# Patient Record
Sex: MALE | Race: WHITE | Employment: OTHER | URBAN - NONMETROPOLITAN AREA
[De-identification: names, ages, dates, MRNs, and addresses within clinical notes are randomized per-mention and may not be internally consistent; named-entity substitution may affect disease eponyms.]

---

## 2017-01-07 ENCOUNTER — OFFICE VISIT (OUTPATIENT)
Dept: FAMILY MEDICINE CLINIC | Age: 72
End: 2017-01-07
Payer: MEDICARE

## 2017-01-07 VITALS
BODY MASS INDEX: 30.42 KG/M2 | WEIGHT: 212 LBS | OXYGEN SATURATION: 98 % | TEMPERATURE: 98.5 F | DIASTOLIC BLOOD PRESSURE: 70 MMHG | HEART RATE: 69 BPM | SYSTOLIC BLOOD PRESSURE: 134 MMHG

## 2017-01-07 DIAGNOSIS — J06.9 ACUTE URI: Primary | ICD-10-CM

## 2017-01-07 PROCEDURE — 99213 OFFICE O/P EST LOW 20 MIN: CPT | Performed by: NURSE PRACTITIONER

## 2017-01-07 RX ORDER — AZELASTINE 1 MG/ML
1 SPRAY, METERED NASAL 2 TIMES DAILY
Qty: 1 BOTTLE | Refills: 3 | Status: SHIPPED | OUTPATIENT
Start: 2017-01-07 | End: 2018-01-17 | Stop reason: ALTCHOICE

## 2017-01-07 RX ORDER — CEFPROZIL 500 MG/1
500 TABLET, FILM COATED ORAL 2 TIMES DAILY
Qty: 20 TABLET | Refills: 0 | Status: SHIPPED | OUTPATIENT
Start: 2017-01-07 | End: 2017-01-17

## 2017-01-07 ASSESSMENT — ENCOUNTER SYMPTOMS
SINUS PRESSURE: 1
EYES NEGATIVE: 1
RHINORRHEA: 1
ALLERGIC/IMMUNOLOGIC NEGATIVE: 1
SORE THROAT: 1
GASTROINTESTINAL NEGATIVE: 1
COUGH: 1

## 2017-03-06 DIAGNOSIS — Z00.00 ANNUAL PHYSICAL EXAM: ICD-10-CM

## 2017-03-06 LAB
ALBUMIN SERPL-MCNC: 4.1 G/DL (ref 3.5–5.2)
ALP BLD-CCNC: 94 U/L (ref 40–130)
ALT SERPL-CCNC: 34 U/L (ref 5–41)
ANION GAP SERPL CALCULATED.3IONS-SCNC: 13 MMOL/L (ref 7–19)
AST SERPL-CCNC: 33 U/L (ref 5–40)
BILIRUB SERPL-MCNC: 0.7 MG/DL (ref 0.2–1.2)
BILIRUBIN DIRECT: 0.2 MG/DL (ref 0–0.3)
BILIRUBIN URINE: NEGATIVE
BILIRUBIN, INDIRECT: 0.5 MG/DL (ref 0.1–1)
BLOOD, URINE: NEGATIVE
BUN BLDV-MCNC: 11 MG/DL (ref 8–23)
CALCIUM SERPL-MCNC: 9.4 MG/DL (ref 8.8–10.2)
CHLORIDE BLD-SCNC: 105 MMOL/L (ref 98–111)
CLARITY: CLEAR
CO2: 24 MMOL/L (ref 22–29)
COLOR: YELLOW
CREAT SERPL-MCNC: 0.8 MG/DL (ref 0.5–1.2)
GFR NON-AFRICAN AMERICAN: >60
GLUCOSE BLD-MCNC: 105 MG/DL (ref 74–109)
GLUCOSE URINE: NEGATIVE MG/DL
HCT VFR BLD CALC: 44.5 % (ref 42–52)
HEMOGLOBIN: 15.1 G/DL (ref 14–18)
KETONES, URINE: NEGATIVE MG/DL
LEUKOCYTE ESTERASE, URINE: NEGATIVE
MCH RBC QN AUTO: 31.7 PG (ref 27–31)
MCHC RBC AUTO-ENTMCNC: 33.9 G/DL (ref 33–37)
MCV RBC AUTO: 93.5 FL (ref 80–94)
NITRITE, URINE: NEGATIVE
PDW BLD-RTO: 13 % (ref 11.5–14.5)
PH UA: 7.5
PLATELET # BLD: 233 K/UL (ref 130–400)
PMV BLD AUTO: 10 FL (ref 7.4–10.4)
POTASSIUM SERPL-SCNC: 5 MMOL/L (ref 3.5–5)
PROTEIN UA: NEGATIVE MG/DL
RBC # BLD: 4.76 M/UL (ref 4.7–6.1)
SODIUM BLD-SCNC: 142 MMOL/L (ref 136–145)
SPECIFIC GRAVITY UA: 1.02
T4 FREE: 0.9 NG/ML (ref 0.9–1.7)
TOTAL PROTEIN: 6.9 G/DL (ref 6.6–8.7)
TSH SERPL DL<=0.05 MIU/L-ACNC: 2.58 UIU/ML (ref 0.27–4.2)
UROBILINOGEN, URINE: 0.2 E.U./DL
WBC # BLD: 6.4 K/UL (ref 4.8–10.8)

## 2017-03-08 LAB
PROSTATE SPECIFIC ANTIGEN FREE: 1.1 NG/ML
PROSTATE SPECIFIC ANTIGEN PERCENT FREE: 23 %
PROSTATE SPECIFIC ANTIGEN: 4.8 NG/ML (ref 0–4)

## 2017-03-10 LAB
CHOLESTEROL, TOTAL: 137 MG/DL (ref 100–199)
HDL PARTICLE NO, NMR: 33 UMOL/L
HDL SIZE: 8.6 NM
HDLC SERPL-MCNC: 43 MG/DL
LARGE HDL PARTICLE, NMR: 3 UMOL/L
LARGE VLDL PARTICLE, NMR: 3.4 NMOL/L
LDL CHOLESTEROL: 76 MG/DL (ref 0–99)
LDL PARTICLE NUMBER, NMR: 1167 NMOL/L
LDL PARTICLE SIZE: 21 NM
LDL SIZE: 21 NM
LP INSULIN RESIST SCORE: 70
SMALL LDL PARTICLE, NMR: 546 NMOL/L
SMALL LDL-P: 546 NMOL/L
TRIGL SERPL-MCNC: 90 MG/DL (ref 0–149)
VLDL SIZE: 47.2 NM

## 2017-03-17 ENCOUNTER — OFFICE VISIT (OUTPATIENT)
Dept: FAMILY MEDICINE CLINIC | Age: 72
End: 2017-03-17
Payer: MEDICARE

## 2017-03-17 VITALS
DIASTOLIC BLOOD PRESSURE: 62 MMHG | BODY MASS INDEX: 30.13 KG/M2 | SYSTOLIC BLOOD PRESSURE: 120 MMHG | WEIGHT: 210 LBS | OXYGEN SATURATION: 98 % | TEMPERATURE: 98 F | HEART RATE: 68 BPM

## 2017-03-17 DIAGNOSIS — N40.0 BENIGN NON-NODULAR PROSTATIC HYPERPLASIA WITHOUT LOWER URINARY TRACT SYMPTOMS: ICD-10-CM

## 2017-03-17 DIAGNOSIS — Z00.00 ANNUAL PHYSICAL EXAM: ICD-10-CM

## 2017-03-17 DIAGNOSIS — E78.5 HYPERLIPOPROTEINEMIA: ICD-10-CM

## 2017-03-17 DIAGNOSIS — E78.5 HYPERLIPIDEMIA, UNSPECIFIED HYPERLIPIDEMIA TYPE: ICD-10-CM

## 2017-03-17 DIAGNOSIS — R97.20 ELEVATED PSA: ICD-10-CM

## 2017-03-17 DIAGNOSIS — J01.80 ACUTE NON-RECURRENT SINUSITIS OF OTHER SINUS: ICD-10-CM

## 2017-03-17 DIAGNOSIS — C18.9 MALIGNANT NEOPLASM OF COLON, UNSPECIFIED PART OF COLON (HCC): ICD-10-CM

## 2017-03-17 DIAGNOSIS — N43.3 HYDROCELE, UNSPECIFIED HYDROCELE TYPE: ICD-10-CM

## 2017-03-17 PROCEDURE — 99214 OFFICE O/P EST MOD 30 MIN: CPT | Performed by: FAMILY MEDICINE

## 2017-03-17 RX ORDER — ROSUVASTATIN CALCIUM 20 MG/1
20 TABLET, COATED ORAL NIGHTLY
Qty: 90 TABLET | Refills: 3 | Status: SHIPPED | OUTPATIENT
Start: 2017-03-17 | End: 2018-05-13 | Stop reason: SDUPTHER

## 2017-03-17 RX ORDER — BENZONATATE 200 MG/1
200 CAPSULE ORAL 3 TIMES DAILY PRN
Qty: 60 CAPSULE | Refills: 1 | Status: SHIPPED | OUTPATIENT
Start: 2017-03-17 | End: 2018-01-17 | Stop reason: ALTCHOICE

## 2017-03-17 RX ORDER — MONTELUKAST SODIUM 10 MG/1
10 TABLET ORAL DAILY
Qty: 30 TABLET | Refills: 5 | Status: SHIPPED | OUTPATIENT
Start: 2017-03-17 | End: 2018-01-17 | Stop reason: ALTCHOICE

## 2017-03-17 ASSESSMENT — ENCOUNTER SYMPTOMS
EYES NEGATIVE: 1
DIARRHEA: 0
SHORTNESS OF BREATH: 0
VOMITING: 0
ALLERGIC/IMMUNOLOGIC NEGATIVE: 1
CHEST TIGHTNESS: 0
BLOOD IN STOOL: 0
CHOKING: 0
COUGH: 1
CONSTIPATION: 0
NAUSEA: 0
WHEEZING: 0

## 2017-08-04 DIAGNOSIS — R39.15 URGENCY OF URINATION: Primary | ICD-10-CM

## 2017-08-07 RX ORDER — TAMSULOSIN HYDROCHLORIDE 0.4 MG/1
CAPSULE ORAL
Qty: 90 CAPSULE | Refills: 3 | Status: SHIPPED | OUTPATIENT
Start: 2017-08-07 | End: 2018-08-06 | Stop reason: SDUPTHER

## 2017-11-08 ENCOUNTER — OFFICE VISIT (OUTPATIENT)
Dept: UROLOGY | Facility: CLINIC | Age: 72
End: 2017-11-08

## 2017-11-08 VITALS
BODY MASS INDEX: 28.77 KG/M2 | DIASTOLIC BLOOD PRESSURE: 74 MMHG | WEIGHT: 201 LBS | TEMPERATURE: 98.6 F | SYSTOLIC BLOOD PRESSURE: 112 MMHG | HEIGHT: 70 IN

## 2017-11-08 DIAGNOSIS — N40.1 BENIGN PROSTATIC HYPERPLASIA WITH LOWER URINARY TRACT SYMPTOMS, SYMPTOM DETAILS UNSPECIFIED: ICD-10-CM

## 2017-11-08 DIAGNOSIS — R97.20 ELEVATED PSA: ICD-10-CM

## 2017-11-08 DIAGNOSIS — N43.3 HYDROCELE, UNSPECIFIED HYDROCELE TYPE: ICD-10-CM

## 2017-11-08 DIAGNOSIS — R39.15 URGENCY OF URINATION: Primary | ICD-10-CM

## 2017-11-08 LAB
BILIRUB BLD-MCNC: NEGATIVE MG/DL
CLARITY, POC: CLEAR
COLOR UR: YELLOW
GLUCOSE UR STRIP-MCNC: NEGATIVE MG/DL
KETONES UR QL: NEGATIVE
LEUKOCYTE EST, POC: ABNORMAL
NITRITE UR-MCNC: NEGATIVE MG/ML
PH UR: 8 [PH] (ref 5–8)
PROT UR STRIP-MCNC: NEGATIVE MG/DL
RBC # UR STRIP: NEGATIVE /UL
SP GR UR: 1.01 (ref 1–1.03)
UROBILINOGEN UR QL: NORMAL

## 2017-11-08 PROCEDURE — 81003 URINALYSIS AUTO W/O SCOPE: CPT | Performed by: UROLOGY

## 2017-11-08 PROCEDURE — 99213 OFFICE O/P EST LOW 20 MIN: CPT | Performed by: UROLOGY

## 2017-11-08 NOTE — PROGRESS NOTES
Subjective    Mr. Rodriguez is 72 y.o. male    CHIEF COMPLAINT: BPH    The patient comes back today for follow-up of BPH clinically he is doing well he is a way score today is 21 which is high but it does not bother him at all he still takes Flomax he says his symptoms are stable so he does not want to do anything different.    He has no gross hematuria is had no flank pain no real burning stinging no urinary tract infections etc. since we seen.    His PSA is back up a little bit at 5.8 but again probably pretty much normal for his age he has had negative biopsies in the past we discussed this at length      History of Present Illness      The following portions of the patient's history were reviewed and updated as appropriate: allergies, current medications, past family history, past medical history, past social history, past surgical history and problem list.    Review of Systems   Constitutional: Negative for chills and fever.   Gastrointestinal: Negative for abdominal pain, anal bleeding and blood in stool.   Genitourinary: Positive for frequency. Negative for difficulty urinating, flank pain, hematuria and urgency.         Current Outpatient Prescriptions:   •  aspirin 81 MG EC tablet, Take 81 mg by mouth Daily., Disp: , Rfl:   •  B Complex-C (SUPER B COMPLEX PO), Take  by mouth., Disp: , Rfl:   •  CIALIS 5 MG tablet, TAKE ONE TABLET BY MOUTH AS NEEDED FOR erectile dysfunction, Disp: , Rfl: 5  •  Coenzyme Q10 200 MG capsule, Take 200 mg by mouth Daily., Disp: , Rfl:   •  Loratadine 10 MG capsule, Take  by mouth., Disp: , Rfl:   •  Omega-3 Fatty Acids (FISH OIL) 1000 MG capsule capsule, Take  by mouth Daily With Breakfast., Disp: , Rfl:   •  rosuvastatin (CRESTOR) 20 MG tablet, Take 20 mg by mouth Daily., Disp: , Rfl:   •  tamsulosin (FLOMAX) 0.4 MG capsule 24 hr capsule, TAKE ONE CAPSULE BY MOUTH DAILY, Disp: 90 capsule, Rfl: 3    Past Medical History:   Diagnosis Date   • BPH with urinary obstruction    • Colon  "cancer    • Elevated PSA    • Frequency of urination    • Hydrocele    • Hyperlipemia    • Peritonitis    • Testalgia        Past Surgical History:   Procedure Laterality Date   • APPENDECTOMY     • COLONOSCOPY     • FOOT SURGERY     • HERNIA REPAIR     • PROSTATE BIOPSY         Social History     Social History   • Marital status:      Spouse name: N/A   • Number of children: N/A   • Years of education: N/A     Social History Main Topics   • Smoking status: Never Smoker   • Smokeless tobacco: Never Used   • Alcohol use Yes   • Drug use: None   • Sexual activity: Not Asked     Other Topics Concern   • None     Social History Narrative       Family History   Problem Relation Age of Onset   • No Known Problems Father    • No Known Problems Mother        Objective    /74  Temp 98.6 °F (37 °C)  Ht 70\" (177.8 cm)  Wt 201 lb (91.2 kg)  BMI 28.84 kg/m2    Physical Exam   Constitutional: He is oriented to person, place, and time. He appears well-developed and well-nourished.   Pulmonary/Chest: Effort normal.   Abdominal: Soft. He exhibits no distension and no mass. There is no tenderness. There is no rebound and no guarding. No hernia. Hernia confirmed negative in the right inguinal area and confirmed negative in the left inguinal area.   Genitourinary: Penis normal. Rectal exam shows no mass, no tenderness and anal tone normal. Enlarged: for the age of the patient. Right testis shows mass. Right testis shows no swelling and no tenderness. Left testis shows no mass, no swelling and no tenderness. Uncircumcised. No hypospadias. No discharge found.   Genitourinary Comments:  The urethral meatus normal in position without evidence of stricture. Epididymis without mass or tenderness. Vas Deferens is palpably normal.Anus and perineum without mass or tenderness. The prostate is approximately 35 ml. It is Symmetric, with a Soft consistency. There are no nodules present. . The seminal vesicles are Not palpable due " to the size of the prostate.    A moderate sized right hydrocele     Neurological: He is alert and oriented to person, place, and time.   Vitals reviewed.        Office Visit on 11/02/2016   Component Date Value Ref Range Status   • Color 11/02/2016 Yellow  Yellow, Straw, Dark Yellow, Cori Final   • Clarity, UA 11/02/2016 Clear  Clear Final   • Glucose, UA 11/02/2016 Negative  Negative mg/dL Final   • Bilirubin 11/02/2016 Negative  Negative Final   • Ketones, UA 11/02/2016 Negative  Negative Final   • Specific Gravity  11/02/2016 1.015  1.005 - 1.030 Final   • Blood, UA 11/02/2016 Negative  Negative Final   • pH, Urine 11/02/2016 7.0  5.0 - 8.0 Final   • Protein, POC 11/02/2016 Negative  Negative mg/dL Final   • Urobilinogen, UA 11/02/2016 Normal  Normal Final   • Leukocytes 11/02/2016 Trace* Negative Final   • Nitrite, UA 11/02/2016 Negative  Negative Final   • Color 11/02/2016 Yellow  Yellow, Straw, Dark Yellow, Cori Final   • Clarity, UA 11/02/2016 Clear  Clear Final   • Glucose, UA 11/02/2016 Negative  Negative mg/dL Final   • Bilirubin 11/02/2016 Negative  Negative Final   • Ketones, UA 11/02/2016 Negative  Negative Final   • Specific Gravity  11/02/2016 1.015  1.005 - 1.030 Final   • Blood, UA 11/02/2016 Negative  Negative Final   • pH, Urine 11/02/2016 7.0  5.0 - 8.0 Final   • Protein, POC 11/02/2016 Negative  Negative mg/dL Final   • Urobilinogen, UA 11/02/2016 Normal  Normal Final   • Leukocytes 11/02/2016 Trace* Negative Final   • Nitrite, UA 11/02/2016 Negative  Negative Final       Results for orders placed or performed in visit on 11/08/17   POC Urinalysis Dipstick, Automated   Result Value Ref Range    Color Yellow Yellow, Straw, Dark Yellow, Cori    Clarity, UA Clear Clear    Glucose, UA Negative Negative, 1000 mg/dL (3+) mg/dL    Bilirubin Negative Negative    Ketones, UA Negative Negative    Specific Gravity  1.015 1.005 - 1.030    Blood, UA Negative Negative    pH, Urine 8.0 5.0 - 8.0     Protein, POC Negative Negative mg/dL    Urobilinogen, UA Normal Normal    Leukocytes Trace (A) Negative    Nitrite, UA Negative Negative       Assessment and Plan    Diagnoses and all orders for this visit:    Urgency of urination  -     POC Urinalysis Dipstick, Automated    Elevated PSA    Hydrocele, unspecified hydrocele type    Benign prostatic hyperplasia with lower urinary tract symptoms, symptom details unspecified    Plan--from a BPH point review the patient seems to be stable and no he does have a high score he will continue his Flomax if his symptoms worsen he will do something different he will let me know    His elevated PSA is slightly higher than it was/is pre-much in the same ballpark and not significantly  For his age he has had negative biopsies on was seen back again in 1 year's time with a PSA he is comfortable this he understands my concerns and I think of answered all his questions    He does not want to do anything with his hydrocele this time

## 2018-01-17 ENCOUNTER — OFFICE VISIT (OUTPATIENT)
Dept: FAMILY MEDICINE CLINIC | Age: 73
End: 2018-01-17
Payer: MEDICARE

## 2018-01-17 VITALS
SYSTOLIC BLOOD PRESSURE: 126 MMHG | TEMPERATURE: 97.9 F | HEART RATE: 65 BPM | DIASTOLIC BLOOD PRESSURE: 74 MMHG | HEIGHT: 70 IN | OXYGEN SATURATION: 97 % | WEIGHT: 216.25 LBS | BODY MASS INDEX: 30.96 KG/M2

## 2018-01-17 DIAGNOSIS — Z92.21 STATUS POST CHEMOTHERAPY: ICD-10-CM

## 2018-01-17 DIAGNOSIS — J30.2 CHRONIC SEASONAL ALLERGIC RHINITIS, UNSPECIFIED TRIGGER: ICD-10-CM

## 2018-01-17 DIAGNOSIS — R79.89 ABNORMAL LIVER FUNCTION TESTS: ICD-10-CM

## 2018-01-17 DIAGNOSIS — E78.2 MIXED HYPERLIPIDEMIA: ICD-10-CM

## 2018-01-17 DIAGNOSIS — N40.0 BENIGN NON-NODULAR PROSTATIC HYPERPLASIA WITHOUT LOWER URINARY TRACT SYMPTOMS: ICD-10-CM

## 2018-01-17 DIAGNOSIS — R73.9 HYPERGLYCEMIA: ICD-10-CM

## 2018-01-17 DIAGNOSIS — C18.9 MALIGNANT NEOPLASM OF COLON, UNSPECIFIED PART OF COLON (HCC): ICD-10-CM

## 2018-01-17 PROCEDURE — G8417 CALC BMI ABV UP PARAM F/U: HCPCS | Performed by: FAMILY MEDICINE

## 2018-01-17 PROCEDURE — G8484 FLU IMMUNIZE NO ADMIN: HCPCS | Performed by: FAMILY MEDICINE

## 2018-01-17 PROCEDURE — 99214 OFFICE O/P EST MOD 30 MIN: CPT | Performed by: FAMILY MEDICINE

## 2018-01-17 PROCEDURE — 3017F COLORECTAL CA SCREEN DOC REV: CPT | Performed by: FAMILY MEDICINE

## 2018-01-17 PROCEDURE — 4040F PNEUMOC VAC/ADMIN/RCVD: CPT | Performed by: FAMILY MEDICINE

## 2018-01-17 PROCEDURE — G8427 DOCREV CUR MEDS BY ELIG CLIN: HCPCS | Performed by: FAMILY MEDICINE

## 2018-01-17 PROCEDURE — 1123F ACP DISCUSS/DSCN MKR DOCD: CPT | Performed by: FAMILY MEDICINE

## 2018-01-17 PROCEDURE — 1036F TOBACCO NON-USER: CPT | Performed by: FAMILY MEDICINE

## 2018-01-17 ASSESSMENT — PATIENT HEALTH QUESTIONNAIRE - PHQ9
6. FEELING BAD ABOUT YOURSELF - OR THAT YOU ARE A FAILURE OR HAVE LET YOURSELF OR YOUR FAMILY DOWN: 0
4. FEELING TIRED OR HAVING LITTLE ENERGY: 0
3. TROUBLE FALLING OR STAYING ASLEEP: 0
8. MOVING OR SPEAKING SO SLOWLY THAT OTHER PEOPLE COULD HAVE NOTICED. OR THE OPPOSITE, BEING SO FIGETY OR RESTLESS THAT YOU HAVE BEEN MOVING AROUND A LOT MORE THAN USUAL: 0
SUM OF ALL RESPONSES TO PHQ QUESTIONS 1-9: 3
2. FEELING DOWN, DEPRESSED OR HOPELESS: 0
1. LITTLE INTEREST OR PLEASURE IN DOING THINGS: 3
5. POOR APPETITE OR OVEREATING: 0
SUM OF ALL RESPONSES TO PHQ9 QUESTIONS 1 & 2: 3
9. THOUGHTS THAT YOU WOULD BE BETTER OFF DEAD, OR OF HURTING YOURSELF: 0
10. IF YOU CHECKED OFF ANY PROBLEMS, HOW DIFFICULT HAVE THESE PROBLEMS MADE IT FOR YOU TO DO YOUR WORK, TAKE CARE OF THINGS AT HOME, OR GET ALONG WITH OTHER PEOPLE: 0
7. TROUBLE CONCENTRATING ON THINGS, SUCH AS READING THE NEWSPAPER OR WATCHING TELEVISION: 0

## 2018-01-17 ASSESSMENT — ENCOUNTER SYMPTOMS
ALLERGIC/IMMUNOLOGIC NEGATIVE: 1
EYES NEGATIVE: 1

## 2018-01-17 NOTE — PROGRESS NOTES
and memory are normal.   Nursing note and vitals reviewed. Assessment:      1. Mixed hyperlipidemia  Lipoprotein NMR    Hepatic Function Panel   2. Hyperglycemia  CBC    Basic Metabolic Panel    Hemoglobin A1C    Blood sugar noted to be 137 on lab of 1/15/18   3. Malignant neoplasm of colon, unspecified part of colon ([facility] [address]      Double: Resection done in 2010 with 1 out of 15 nodes being positive, stage IIIA colon malignancy   4. Abnormal liver function tests      Repeat labs on 1/15/18 showed slight elevation in AST at 43 whereas previously it was noted to be 5/ut a LT is slightly up at 79 whereas previously this was 77.   5. Administered 2011 Dr. Allyson Vivar      6. Chronic seasonal allergic rhinitis, unspecified trigger     7. Benign non-nodular prostatic hyperplasia without lower urinary tract symptoms             Plan:      I am having Mr. Gera Weber maintain his :   Outpatient Prescriptions Marked as Taking for the 1/17/18 encounter (Office Visit) with Niru Lira MD   Medication Sig Dispense Refill    rosuvastatin (CRESTOR) 20 MG tablet Take 1 tablet by mouth nightly 90 tablet 3    tadalafil (CIALIS) 5 MG tablet Take 1 tablet by mouth as needed for Erectile Dysfunction 30 tablet 5    b complex vitamins capsule Take 1 capsule by mouth daily      aspirin 81 MG tablet Take 81 mg by mouth daily.  Omega-3 Fatty Acids (OMEGA 3 PO) Take 1 tablet by mouth daily.  tamsulosin (FLOMAX) 0.4 MG capsule Take 0.4 mg by mouth daily.  Coenzyme Q10 (CO Q 10 PO) Take 200 mg by mouth daily.  loratadine (CLARITIN) 10 MG tablet Take 10 mg by mouth daily.  2     ,Patient states they are no longer utilizing these medication:   Medications Discontinued During This Encounter   Medication Reason    montelukast (SINGULAIR) 10 MG tablet Alternate therapy    benzonatate (TESSALON) 200 MG capsule Therapy completed    azelastine (ASTELIN) 0.1 % nasal spray Therapy completed    I have refilled the

## 2018-01-18 ASSESSMENT — ENCOUNTER SYMPTOMS
CHEST TIGHTNESS: 0
NAUSEA: 0
VOMITING: 0
WHEEZING: 0
DIARRHEA: 0
BLOOD IN STOOL: 0
SHORTNESS OF BREATH: 0
CONSTIPATION: 0
COUGH: 0

## 2018-03-05 DIAGNOSIS — E78.2 MIXED HYPERLIPIDEMIA: ICD-10-CM

## 2018-03-05 DIAGNOSIS — R73.9 HYPERGLYCEMIA: ICD-10-CM

## 2018-03-05 LAB
ALBUMIN SERPL-MCNC: 4.3 G/DL (ref 3.5–5.2)
ALP BLD-CCNC: 84 U/L (ref 40–130)
ALT SERPL-CCNC: 68 U/L (ref 5–41)
ANION GAP SERPL CALCULATED.3IONS-SCNC: 19 MMOL/L (ref 7–19)
AST SERPL-CCNC: 42 U/L (ref 5–40)
BILIRUB SERPL-MCNC: 1 MG/DL (ref 0.2–1.2)
BILIRUBIN DIRECT: 0.2 MG/DL (ref 0–0.3)
BILIRUBIN, INDIRECT: 0.8 MG/DL (ref 0.1–1)
BUN BLDV-MCNC: 12 MG/DL (ref 8–23)
CALCIUM SERPL-MCNC: 9.4 MG/DL (ref 8.8–10.2)
CHLORIDE BLD-SCNC: 101 MMOL/L (ref 98–111)
CO2: 23 MMOL/L (ref 22–29)
CREAT SERPL-MCNC: 0.9 MG/DL (ref 0.5–1.2)
GFR NON-AFRICAN AMERICAN: >60
GLUCOSE BLD-MCNC: 117 MG/DL (ref 74–109)
HBA1C MFR BLD: 6 %
HCT VFR BLD CALC: 49.3 % (ref 42–52)
HEMOGLOBIN: 16.4 G/DL (ref 14–18)
MCH RBC QN AUTO: 31.4 PG (ref 27–31)
MCHC RBC AUTO-ENTMCNC: 33.3 G/DL (ref 33–37)
MCV RBC AUTO: 94.3 FL (ref 80–94)
PDW BLD-RTO: 12.7 % (ref 11.5–14.5)
PLATELET # BLD: 253 K/UL (ref 130–400)
PMV BLD AUTO: 10.1 FL (ref 9.4–12.4)
POTASSIUM SERPL-SCNC: 3.9 MMOL/L (ref 3.5–5)
RBC # BLD: 5.23 M/UL (ref 4.7–6.1)
SODIUM BLD-SCNC: 143 MMOL/L (ref 136–145)
TOTAL PROTEIN: 7 G/DL (ref 6.6–8.7)
WBC # BLD: 5.5 K/UL (ref 4.8–10.8)

## 2018-03-07 ENCOUNTER — TELEPHONE (OUTPATIENT)
Dept: FAMILY MEDICINE CLINIC | Age: 73
End: 2018-03-07

## 2018-03-09 LAB
CHOLESTEROL, TOTAL: 202 MG/DL
EER LIPOPROFILE BY NMR: ABNORMAL
HDL PARTICLE NO, NMR: 30.2 UMOL/L
HDL SIZE: 8.2 NM
HDLC SERPL-MCNC: 36 MG/DL (ref 40–59)
LARGE HDL PARTICLE, NMR: ABNORMAL UMOL/L
LARGE VLDL PARTICLE, NMR: 3 NMOL/L
LDL CHOLESTEROL: 135 MG/DL
LDL PARTICLE NUMBER, NMR: 1968 NMOL/L
LDL PARTICLE SIZE: 20.2 NM
SMALL LDL PARTICLE, NMR: >1085 NMOL/L
TRIGL SERPL-MCNC: 155 MG/DL (ref 30–149)
VLDL SIZE: 49.6 NM

## 2018-03-14 ENCOUNTER — TELEPHONE (OUTPATIENT)
Dept: FAMILY MEDICINE CLINIC | Age: 73
End: 2018-03-14

## 2018-03-14 DIAGNOSIS — E78.2 MIXED HYPERLIPIDEMIA: Primary | ICD-10-CM

## 2018-05-13 DIAGNOSIS — E78.5 HYPERLIPIDEMIA, UNSPECIFIED HYPERLIPIDEMIA TYPE: ICD-10-CM

## 2018-05-14 RX ORDER — ROSUVASTATIN CALCIUM 20 MG/1
TABLET, COATED ORAL
Qty: 90 TABLET | Refills: 3 | Status: SHIPPED | OUTPATIENT
Start: 2018-05-14 | End: 2019-01-15 | Stop reason: SDUPTHER

## 2018-05-18 DIAGNOSIS — E78.2 MIXED HYPERLIPIDEMIA: ICD-10-CM

## 2018-05-18 LAB
ALBUMIN SERPL-MCNC: 4.5 G/DL (ref 3.5–5.2)
ALP BLD-CCNC: 91 U/L (ref 40–130)
ALT SERPL-CCNC: 53 U/L (ref 5–41)
AST SERPL-CCNC: 41 U/L (ref 5–40)
BILIRUB SERPL-MCNC: 0.9 MG/DL (ref 0.2–1.2)
BILIRUBIN DIRECT: 0.2 MG/DL (ref 0–0.3)
BILIRUBIN, INDIRECT: 0.7 MG/DL (ref 0.1–1)
TOTAL PROTEIN: 7.2 G/DL (ref 6.6–8.7)

## 2018-05-21 ENCOUNTER — TELEPHONE (OUTPATIENT)
Dept: FAMILY MEDICINE CLINIC | Age: 73
End: 2018-05-21

## 2018-05-22 LAB
CHOLESTEROL, TOTAL: 137 MG/DL
EER LIPOPROFILE BY NMR: ABNORMAL
HDL PARTICLE NO, NMR: 29.6 UMOL/L
HDL SIZE: 8.4 NM
HDLC SERPL-MCNC: 38 MG/DL (ref 40–59)
LARGE HDL PARTICLE, NMR: <2.8 UMOL/L
LARGE VLDL PARTICLE, NMR: 1.5 NMOL/L
LDL CHOLESTEROL: 76 MG/DL
LDL PARTICLE NUMBER, NMR: 1256 NMOL/L
LDL PARTICLE SIZE: 20.5 NM
SMALL LDL PARTICLE, NMR: 640 NMOL/L
TRIGL SERPL-MCNC: 114 MG/DL (ref 30–149)
VLDL SIZE: 48.2 NM

## 2018-05-24 ENCOUNTER — TELEPHONE (OUTPATIENT)
Dept: FAMILY MEDICINE CLINIC | Age: 73
End: 2018-05-24

## 2018-08-06 DIAGNOSIS — R39.15 URGENCY OF URINATION: ICD-10-CM

## 2018-08-06 RX ORDER — TAMSULOSIN HYDROCHLORIDE 0.4 MG/1
CAPSULE ORAL
Qty: 90 CAPSULE | Refills: 1 | Status: SHIPPED | OUTPATIENT
Start: 2018-08-06 | End: 2018-11-02 | Stop reason: SDUPTHER

## 2018-11-02 ENCOUNTER — TELEPHONE (OUTPATIENT)
Dept: UROLOGY | Facility: CLINIC | Age: 73
End: 2018-11-02

## 2018-11-02 DIAGNOSIS — R39.15 URGENCY OF URINATION: ICD-10-CM

## 2018-11-02 RX ORDER — TAMSULOSIN HYDROCHLORIDE 0.4 MG/1
1 CAPSULE ORAL DAILY
Qty: 90 CAPSULE | Refills: 1 | Status: SHIPPED | OUTPATIENT
Start: 2018-11-02 | End: 2019-02-08 | Stop reason: SDUPTHER

## 2018-11-02 NOTE — TELEPHONE ENCOUNTER
Patient called saying he will need a refill on Tamsulosin and have if faxed to [facility] fof him.

## 2019-01-15 DIAGNOSIS — E78.5 HYPERLIPIDEMIA, UNSPECIFIED HYPERLIPIDEMIA TYPE: ICD-10-CM

## 2019-01-15 RX ORDER — ROSUVASTATIN CALCIUM 20 MG/1
TABLET, COATED ORAL
Qty: 90 TABLET | Refills: 5 | Status: SHIPPED | OUTPATIENT
Start: 2019-01-15 | End: 2020-01-14 | Stop reason: ALTCHOICE

## 2019-02-08 DIAGNOSIS — R39.15 URGENCY OF URINATION: ICD-10-CM

## 2019-02-08 RX ORDER — TAMSULOSIN HYDROCHLORIDE 0.4 MG/1
CAPSULE ORAL
Qty: 90 CAPSULE | Refills: 1 | Status: SHIPPED | OUTPATIENT
Start: 2019-02-08 | End: 2019-05-10 | Stop reason: SDUPTHER

## 2019-05-10 ENCOUNTER — TELEPHONE (OUTPATIENT)
Dept: UROLOGY | Facility: CLINIC | Age: 74
End: 2019-05-10

## 2019-05-10 DIAGNOSIS — R39.15 URGENCY OF URINATION: ICD-10-CM

## 2019-05-10 RX ORDER — TAMSULOSIN HYDROCHLORIDE 0.4 MG/1
1 CAPSULE ORAL DAILY
Qty: 30 CAPSULE | Refills: 0 | Status: SHIPPED | OUTPATIENT
Start: 2019-05-10 | End: 2019-06-11 | Stop reason: SDUPTHER

## 2019-05-10 NOTE — TELEPHONE ENCOUNTER
Pt called needing a refill on his Flomax 0.4 mg for his urgency. I see he is a old [facility] pt and has not been seen within the last year. Got him in to see Rosario for FU on 5/20/19. Did send in a prescription to make it till his next up for he is completely out. Pt verified understanding he will not get any more refills until seen by Urology.

## 2019-05-21 ENCOUNTER — TELEPHONE (OUTPATIENT)
Dept: UROLOGY | Facility: CLINIC | Age: 74
End: 2019-05-21

## 2019-05-21 NOTE — TELEPHONE ENCOUNTER
Called and left the patient a message about his missed appointment on 5/20/2019 and to have him call back about rescheduling his missed appointment. I did inform the patient of the 24 hr cancellation policy.     I left a message on the patient's mobile and home phone for him to call back.

## 2019-05-21 NOTE — TELEPHONE ENCOUNTER
Patient called back and said that he is in [place] and that his wife had a brain aneurysm and that he forgot about his appointment with everything going on with his wife. He said he would call back to reschedule when things settle down with his wife. I told patient that was fine and to call us if he needs anything.

## 2019-06-11 ENCOUNTER — OFFICE VISIT (OUTPATIENT)
Dept: UROLOGY | Facility: CLINIC | Age: 74
End: 2019-06-11

## 2019-06-11 VITALS — TEMPERATURE: 98.6 F | BODY MASS INDEX: 29.35 KG/M2 | HEIGHT: 70 IN | WEIGHT: 205 LBS

## 2019-06-11 DIAGNOSIS — R39.15 URGENCY OF URINATION: ICD-10-CM

## 2019-06-11 DIAGNOSIS — N40.1 BENIGN PROSTATIC HYPERPLASIA WITH LOWER URINARY TRACT SYMPTOMS, SYMPTOM DETAILS UNSPECIFIED: Primary | ICD-10-CM

## 2019-06-11 DIAGNOSIS — N52.9 ERECTILE DYSFUNCTION, UNSPECIFIED ERECTILE DYSFUNCTION TYPE: ICD-10-CM

## 2019-06-11 PROCEDURE — 51798 US URINE CAPACITY MEASURE: CPT | Performed by: NURSE PRACTITIONER

## 2019-06-11 PROCEDURE — 99212 OFFICE O/P EST SF 10 MIN: CPT | Performed by: NURSE PRACTITIONER

## 2019-06-11 RX ORDER — TADALAFIL 5 MG
5 TABLET ORAL AS NEEDED
Qty: 30 TABLET | Refills: 5 | Status: SHIPPED | OUTPATIENT
Start: 2019-06-11 | End: 2020-07-20

## 2019-06-11 RX ORDER — TAMSULOSIN HYDROCHLORIDE 0.4 MG/1
1 CAPSULE ORAL DAILY
Qty: 90 CAPSULE | Refills: 4 | Status: SHIPPED | OUTPATIENT
Start: 2019-06-11 | End: 2020-06-01

## 2019-06-11 NOTE — PATIENT INSTRUCTIONS

## 2019-06-11 NOTE — PROGRESS NOTES
Mr. Rodriguez is 74 y.o. male    Chief Complaint   Patient presents with   • Benign Prostatic Hypertrophy       Benign Prostatic Hypertrophy   This is a chronic problem. The current episode started more than 1 year ago. The problem is unchanged. Irritative symptoms include frequency, nocturia (x2) and urgency. Obstructive symptoms include dribbling, incomplete emptying, an intermittent stream, straining and a weak stream. Pertinent negatives include no chills, dysuria, hematuria, hesitancy, nausea or vomiting. AUA score is 8-19 (Bother score of 2 indicating he is mostly satisfied with his symptoms.). Nothing aggravates the symptoms. Past treatments include tamsulosin. The treatment provided moderate relief.       The following portions of the patient's history were reviewed and updated as appropriate: allergies, current medications, past family history, past medical history, past social history, past surgical history and problem list.    Review of Systems   Constitutional: Negative for chills and fever.   Gastrointestinal: Negative for abdominal distention, abdominal pain, anal bleeding, blood in stool, nausea and vomiting.   Genitourinary: Positive for frequency, incomplete emptying, nocturia (x2) and urgency. Negative for decreased urine volume, difficulty urinating, discharge, dysuria, enuresis, flank pain, genital sores, hematuria, hesitancy, penile pain, penile swelling, scrotal swelling and testicular pain.         Current Outpatient Medications:   •  aspirin 81 MG EC tablet, Take 81 mg by mouth Daily., Disp: , Rfl:   •  B Complex-C (SUPER B COMPLEX PO), Take  by mouth., Disp: , Rfl:   •  CIALIS 5 MG tablet, Take 1 tablet by mouth As Needed for erectile dysfunction., Disp: 30 tablet, Rfl: 5  •  Coenzyme Q10 200 MG capsule, Take 200 mg by mouth Daily., Disp: , Rfl:   •  Loratadine 10 MG capsule, Take  by mouth., Disp: , Rfl:   •  Omega-3 Fatty Acids (FISH OIL) 1000 MG capsule capsule, Take  by mouth Daily With  "Breakfast., Disp: , Rfl:   •  rosuvastatin (CRESTOR) 20 MG tablet, Take 20 mg by mouth Daily., Disp: , Rfl:   •  tamsulosin (FLOMAX) 0.4 MG capsule 24 hr capsule, Take 1 capsule by mouth Daily., Disp: 90 capsule, Rfl: 4    Past Medical History:   Diagnosis Date   • BPH with urinary obstruction    • Colon cancer (CMS/HCC)    • Elevated PSA    • Frequency of urination    • Hydrocele    • Hyperlipemia    • Peritonitis (CMS/HCC)    • Testalgia        Past Surgical History:   Procedure Laterality Date   • APPENDECTOMY     • COLONOSCOPY     • FOOT SURGERY     • HERNIA REPAIR     • PROSTATE BIOPSY         Social History     Socioeconomic History   • Marital status:      Spouse name: Not on file   • Number of children: Not on file   • Years of education: Not on file   • Highest education level: Not on file   Tobacco Use   • Smoking status: Never Smoker   • Smokeless tobacco: Never Used   Substance and Sexual Activity   • Alcohol use: Yes       Family History   Problem Relation Age of Onset   • No Known Problems Father    • No Known Problems Mother        Objective    Temp 98.6 °F (37 °C)   Ht 177.8 cm (70\")   Wt 93 kg (205 lb)   BMI 29.41 kg/m²     Physical Exam   Constitutional: He is oriented to person, place, and time. He appears well-developed and well-nourished.   HENT:   Head: Normocephalic.   Pulmonary/Chest: Effort normal. No respiratory distress.   Abdominal: He exhibits no distension.   Musculoskeletal: He exhibits no edema.   Neurological: He is alert and oriented to person, place, and time.   Skin: Skin is warm and dry.   Psychiatric: He has a normal mood and affect. His behavior is normal.   Vitals reviewed.    Patient's Body mass index is 29.41 kg/m². BMI is above normal parameters. Recommendations include: educational material.      Appointment on 05/20/2019   Component Date Value Ref Range Status   • Color 06/11/2019 Yellow  Yellow, Straw, Dark Yellow, Cori Final   • Clarity, UA 06/11/2019 Clear  " Clear Final   • Glucose, UA 06/11/2019 Negative  Negative, 1000 mg/dL (3+) mg/dL Final   • Bilirubin 06/11/2019 Negative  Negative Final   • Ketones, UA 06/11/2019 Negative  Negative Final   • Specific Gravity  06/11/2019 1.030  1.005 - 1.030 Final   • Blood, UA 06/11/2019 Negative  Negative Final   • pH, Urine 06/11/2019 7.0  5.0 - 8.0 Final   • Protein, POC 06/11/2019 Negative  Negative mg/dL Final   • Urobilinogen, UA 06/11/2019 Normal  Normal Final   • Leukocytes 06/11/2019 Trace* Negative Final   • Nitrite, UA 06/11/2019 Negative  Negative Final       Results for orders placed or performed in visit on 05/20/19   POC Urinalysis Dipstick   Result Value Ref Range    Color Yellow Yellow, Straw, Dark Yellow, Cori    Clarity, UA Clear Clear    Glucose, UA Negative Negative, 1000 mg/dL (3+) mg/dL    Bilirubin Negative Negative    Ketones, UA Negative Negative    Specific Gravity  1.030 1.005 - 1.030    Blood, UA Negative Negative    pH, Urine 7.0 5.0 - 8.0    Protein, POC Negative Negative mg/dL    Urobilinogen, UA Normal Normal    Leukocytes Trace (A) Negative    Nitrite, UA Negative Negative      Estimation of residual urine via abdominal ultrasound  Residual Urine: 70 ml  Indication: BPH   Position: Supine  Examination: Incremental scanning of the suprapubic area using 3 MHz transducer using copious amounts of acoustic gel.   Findings: An anechoic area was demonstrated which represented the bladder, with measurement of residual urine as noted. IRosario, inspected this myself. In that the residual urine was stable or insignificant, no treatment will be necessary at this time.     Assessment/Plan   Assessment and Plan    Migue was seen today for benign prostatic hypertrophy.    Diagnoses and all orders for this visit:    Benign prostatic hyperplasia with lower urinary tract symptoms, symptom details unspecified    Urgency of urination  -     tamsulosin (FLOMAX) 0.4 MG capsule 24 hr capsule; Take 1 capsule by mouth  Daily.    Erectile dysfunction, unspecified erectile dysfunction type  -     CIALIS 5 MG tablet; Take 1 tablet by mouth As Needed for erectile dysfunction.      Patient presents for yearly follow-up of BPH.  This is stable at this time.  Patient reports he is happy with his voiding symptoms and is pleased with his medications.  He is experiencing no adverse reactions and would like to continue these treatments.  I have reordered both his Cialis and Flomax.  He will return for follow-up in 1 year.

## 2020-01-14 ENCOUNTER — OFFICE VISIT (OUTPATIENT)
Dept: FAMILY MEDICINE CLINIC | Age: 75
End: 2020-01-14
Payer: MEDICARE

## 2020-01-14 VITALS
DIASTOLIC BLOOD PRESSURE: 72 MMHG | HEART RATE: 66 BPM | SYSTOLIC BLOOD PRESSURE: 130 MMHG | RESPIRATION RATE: 16 BRPM | OXYGEN SATURATION: 98 % | TEMPERATURE: 96 F | HEIGHT: 70 IN | WEIGHT: 211.6 LBS | BODY MASS INDEX: 30.29 KG/M2

## 2020-01-14 DIAGNOSIS — Z00.00 ANNUAL PHYSICAL EXAM: ICD-10-CM

## 2020-01-14 LAB
ALBUMIN SERPL-MCNC: 4.1 G/DL (ref 3.5–5.2)
ALP BLD-CCNC: 81 U/L (ref 40–130)
ALT SERPL-CCNC: 33 U/L (ref 5–41)
ANION GAP SERPL CALCULATED.3IONS-SCNC: 12 MMOL/L (ref 7–19)
AST SERPL-CCNC: 29 U/L (ref 5–40)
BILIRUB SERPL-MCNC: 0.8 MG/DL (ref 0.2–1.2)
BILIRUBIN DIRECT: 0.2 MG/DL (ref 0–0.3)
BILIRUBIN URINE: NEGATIVE
BILIRUBIN, INDIRECT: 0.6 MG/DL (ref 0.1–1)
BLOOD, URINE: NEGATIVE
BUN BLDV-MCNC: 13 MG/DL (ref 8–23)
CALCIUM SERPL-MCNC: 8.9 MG/DL (ref 8.8–10.2)
CHLORIDE BLD-SCNC: 103 MMOL/L (ref 98–111)
CHOLESTEROL, TOTAL: 172 MG/DL (ref 160–199)
CLARITY: CLEAR
CO2: 24 MMOL/L (ref 22–29)
COLOR: YELLOW
CREAT SERPL-MCNC: 0.9 MG/DL (ref 0.5–1.2)
GFR NON-AFRICAN AMERICAN: >60
GLUCOSE BLD-MCNC: 120 MG/DL (ref 74–109)
GLUCOSE URINE: NEGATIVE MG/DL
HCT VFR BLD CALC: 47.9 % (ref 42–52)
HDLC SERPL-MCNC: 43 MG/DL (ref 55–121)
HEMOGLOBIN: 15.9 G/DL (ref 14–18)
KETONES, URINE: NEGATIVE MG/DL
LDL CHOLESTEROL CALCULATED: 112 MG/DL
LEUKOCYTE ESTERASE, URINE: NEGATIVE
MCH RBC QN AUTO: 32.1 PG (ref 27–31)
MCHC RBC AUTO-ENTMCNC: 33.2 G/DL (ref 33–37)
MCV RBC AUTO: 96.6 FL (ref 80–94)
NITRITE, URINE: NEGATIVE
PDW BLD-RTO: 12.3 % (ref 11.5–14.5)
PH UA: 6.5 (ref 5–8)
PLATELET # BLD: 235 K/UL (ref 130–400)
PMV BLD AUTO: 9.7 FL (ref 9.4–12.4)
POTASSIUM SERPL-SCNC: 4.2 MMOL/L (ref 3.5–5)
PROTEIN UA: NEGATIVE MG/DL
RBC # BLD: 4.96 M/UL (ref 4.7–6.1)
SODIUM BLD-SCNC: 139 MMOL/L (ref 136–145)
SPECIFIC GRAVITY UA: 1.01 (ref 1–1.03)
T4 FREE: 1.02 NG/DL (ref 0.93–1.7)
TOTAL PROTEIN: 6.8 G/DL (ref 6.6–8.7)
TRIGL SERPL-MCNC: 84 MG/DL (ref 0–149)
TSH SERPL DL<=0.05 MIU/L-ACNC: 2.78 UIU/ML (ref 0.27–4.2)
UROBILINOGEN, URINE: 0.2 E.U./DL
WBC # BLD: 5 K/UL (ref 4.8–10.8)

## 2020-01-14 PROCEDURE — G8484 FLU IMMUNIZE NO ADMIN: HCPCS | Performed by: FAMILY MEDICINE

## 2020-01-14 PROCEDURE — G0439 PPPS, SUBSEQ VISIT: HCPCS | Performed by: FAMILY MEDICINE

## 2020-01-14 ASSESSMENT — ENCOUNTER SYMPTOMS
CONSTIPATION: 0
CHEST TIGHTNESS: 0
COUGH: 0
DIARRHEA: 0
WHEEZING: 0
NAUSEA: 0
EYES NEGATIVE: 1
SHORTNESS OF BREATH: 0
BLOOD IN STOOL: 0
VOMITING: 0

## 2020-01-14 ASSESSMENT — PATIENT HEALTH QUESTIONNAIRE - PHQ9
SUM OF ALL RESPONSES TO PHQ QUESTIONS 1-9: 0
SUM OF ALL RESPONSES TO PHQ9 QUESTIONS 1 & 2: 0
2. FEELING DOWN, DEPRESSED OR HOPELESS: 0
SUM OF ALL RESPONSES TO PHQ QUESTIONS 1-9: 0
1. LITTLE INTEREST OR PLEASURE IN DOING THINGS: 0

## 2020-01-14 NOTE — PROGRESS NOTES
Subjective:      Patient ID: Carolyn Pillai is a 75 y.o. male. HPI  This gentleman is seen here intermittently for follow-up of medical issues. He was diagnosed with stage IIIa colon cancer and had double colectomy done with 1 and 15 nodes being positive on 2010. Patient had surgery by Dr Maite Hastings and Dr. Ariel Montano. He then had chemotherapy for a period of 6 months done by Dr. Yoni Escalera in 2011. On recent labs done by Dr. Yoni Escalera on the patient was noted to have slight elevation in this AST at 54 dated 1/3/18. On the same date ALT was noted to be elevated at77. Follow-up labs were then ordered for 1/15/18 to verify results. Those were obtained from [facility] lab today and showed that his labs were slightly elevated again at 43 and 79 respectively. The patient had been told to stop Crestor 20 mg by mouth dailyThe patient did voice understanding. It certainly is conceivable that he may have adjusted his lifestyle such that the lipid disorder may have normalized at this point in time anyway. He currently is on omega-3 Fish oil daily as well as some other vitamins also. It was noted on the labs of 1/3/18 that his blood sugar was elevated at 121 and once again on labs of 1/15/18 his sugar was again elevated at 137. I did encourage him to watch intake of sweets, try to exercise regularly, and we will be checking that in 3 months when we check the liver functions and lipids. In addition to obtaining BMP which will include the sugar, we will also obtain hemoglobin A1c which should be a 3 month average for his sugar metabolism. Will be having fasting labs today to include CBC, BMP, lipids, free T4, TSH, urinalysis, hepatic function panel, free and total PSA as well as free and total testosterone and vitamin D levels. These results will be notified to the patient and managed as is deemed appropriate.   Tells me today that he did have a scope of his colon done sometime in February of 2019 by Dr. Rony Griffin and that it was said to be negative. We did do exam on him today including rectal exam and did perform Hemoccult of stool and this was negative. My understanding that the cancer involve both the sigmoid area of the colon as well as the cecum.     He does have a history of BPH for which he uses Flomax 0.4 by mouth daily.     For seasonal allergies, he remains on Claritin at 10 mg by mouth daily. On exam he was noted to have enlargement of prostate. He does have symptoms of BPH and does currently remain on Flomax 0.4 at 1 p.o. daily. Review of Systems   Constitutional: Negative. HENT: Negative. Eyes: Negative. Respiratory: Negative for cough, chest tightness, shortness of breath and wheezing. Cardiovascular: Negative for chest pain, palpitations and leg swelling. Gastrointestinal: Negative for blood in stool, constipation, diarrhea, nausea and vomiting. Genitourinary: Negative for hematuria. Skin: Negative. Neurological: Negative. Psychiatric/Behavioral: Negative. Objective:   Physical Exam  Vitals signs and nursing note reviewed. Constitutional:       General: He is not in acute distress. Appearance: Normal appearance. He is well-developed. He is not ill-appearing, toxic-appearing or diaphoretic. HENT:      Head: Normocephalic and atraumatic. Right Ear: Tympanic membrane, ear canal and external ear normal. There is no impacted cerumen. Left Ear: Tympanic membrane, ear canal and external ear normal. There is no impacted cerumen. Nose: Nose normal.      Mouth/Throat:      Lips: Pink. Mouth: Mucous membranes are moist.      Dentition: Normal dentition. Tongue: No lesions. Pharynx: Oropharynx is clear. Uvula midline. Tonsils: No tonsillar exudate or tonsillar abscesses. Eyes:      General: Lids are normal.         Right eye: No discharge. Left eye: No discharge. Extraocular Movements: Extraocular movements intact.       Right eye: Normal extraocular motion. Left eye: Normal extraocular motion. Conjunctiva/sclera: Conjunctivae normal.      Right eye: Right conjunctiva is not injected. Left eye: Left conjunctiva is not injected. Pupils: Pupils are equal, round, and reactive to light. Neck:      Musculoskeletal: Normal range of motion and neck supple. No neck rigidity or muscular tenderness. Thyroid: No thyromegaly. Vascular: No carotid bruit or JVD. Cardiovascular:      Rate and Rhythm: Normal rate and regular rhythm. Pulses:           Carotid pulses are 2+ on the right side and 2+ on the left side. Radial pulses are 2+ on the right side and 2+ on the left side. Heart sounds: Normal heart sounds, S1 normal and S2 normal. No murmur. No friction rub. No gallop. Pulmonary:      Effort: Pulmonary effort is normal. No accessory muscle usage or respiratory distress. Breath sounds: Normal breath sounds. No stridor. No wheezing, rhonchi or rales. Chest:      Chest wall: No tenderness. Abdominal:      General: Bowel sounds are normal. There is no distension or abdominal bruit. Palpations: Abdomen is soft. There is no mass. Tenderness: There is no tenderness. There is no right CVA tenderness, left CVA tenderness, guarding or rebound. Hernia: No hernia is present. Musculoskeletal: Normal range of motion. General: No swelling, tenderness, deformity or signs of injury. Right lower leg: No edema. Left lower leg: No edema. Lymphadenopathy:      Cervical: No cervical adenopathy. Right cervical: No superficial cervical adenopathy. Left cervical: No superficial cervical adenopathy. Skin:     General: Skin is warm and dry. Nails: There is no clubbing. Neurological:      General: No focal deficit present. Mental Status: He is alert and oriented to person, place, and time. Mental status is at baseline. Cranial Nerves: No facial asymmetry. Motor: No weakness or tremor. Coordination: Coordination normal.      Gait: Gait normal.      Deep Tendon Reflexes: Reflexes are normal and symmetric. Psychiatric:         Attention and Perception: Attention normal.         Mood and Affect: Mood normal.         Speech: Speech normal.         Behavior: Behavior normal.         Thought Content: Thought content normal.         Cognition and Memory: Memory normal.         Judgment: Judgment normal.         /72   Pulse 66   Temp 96 °F (35.6 °C) (Temporal)   Resp 16   Ht 5' 10\" (1.778 m)   Wt 211 lb 9.6 oz (96 kg)   SpO2 98%   BMI 30.36 kg/m²   Assessment:         Diagnosis Orders   1. Annual physical exam  CBC    Basic Metabolic Panel    Hepatic Function Panel    T4, Free    TSH without Reflex    Urinalysis    Lipid Panel    Testosterone Free and Total Male    PSA, Total and Free   2. Malignant neoplasm of colon, unspecified part of colon ([facility] [address]     3. Benign non-nodular prostatic hyperplasia without lower urinary tract symptoms     4. Hyperlipidemia, unspecified hyperlipidemia type             Plan:       I am having Mr. Lana Messina maintain his :   Outpatient Medications Marked as Taking for the 1/14/20 encounter (Office Visit) with Emy hTornton MD   Medication Sig Dispense Refill    b complex vitamins capsule Take 1 capsule by mouth daily      aspirin 81 MG tablet Take 81 mg by mouth daily.  Omega-3 Fatty Acids (OMEGA 3 PO) Take 1 tablet by mouth daily.  tamsulosin (FLOMAX) 0.4 MG capsule Take 0.4 mg by mouth daily.  Coenzyme Q10 (CO Q 10 PO) Take 200 mg by mouth daily.      ,Patient states they are no longer utilizing these medication:   Medications Discontinued During This Encounter   Medication Reason    rosuvastatin (CRESTOR) 20 MG tablet Therapy completed    loratadine (CLARITIN) 10 MG tablet Alternate therapy    tadalafil (CIALIS) 5 MG tablet Therapy completed    I have refilled the following medication today:

## 2020-01-16 LAB
PROSTATE SPECIFIC ANTIGEN FREE: 1.4 NG/ML
PROSTATE SPECIFIC ANTIGEN PERCENT FREE: 24 %
PROSTATE SPECIFIC ANTIGEN: 5.8 NG/ML (ref 0–4)

## 2020-01-17 LAB
SEX HORMONE BINDING GLOBULIN: 27 NMOL/L (ref 11–80)
TESTOSTERONE FREE-NONMALE: 86.9 PG/ML (ref 47–244)
TESTOSTERONE TOTAL: 384 NG/DL (ref 220–1000)

## 2020-01-21 ENCOUNTER — TELEPHONE (OUTPATIENT)
Dept: FAMILY MEDICINE CLINIC | Age: 75
End: 2020-01-21

## 2020-02-23 PROBLEM — C18.9 ADENOCARCINOMA OF COLON (HCC): Status: ACTIVE | Noted: 2020-02-23

## 2020-02-24 ENCOUNTER — TELEPHONE (OUTPATIENT)
Dept: ONCOLOGY | Facility: CLINIC | Age: 75
End: 2020-02-24

## 2020-02-24 ENCOUNTER — CLINICAL SUPPORT (OUTPATIENT)
Dept: INTERNAL MEDICINE | Facility: CLINIC | Age: 75
End: 2020-02-24

## 2020-02-24 DIAGNOSIS — C18.9 ADENOCARCINOMA OF COLON (HCC): Primary | ICD-10-CM

## 2020-02-24 PROCEDURE — 36415 COLL VENOUS BLD VENIPUNCTURE: CPT | Performed by: NURSE PRACTITIONER

## 2020-02-24 NOTE — TELEPHONE ENCOUNTER
----- Message from Mariano Yu MD sent at 2/23/2020 11:50 PM CST -----  Regarding: Missing pre-office labs  Please asked patient to come in ASAP to have his labs drawn for his annual visit on Thursday:     Pre-office (specify fasting) iron, fe sat, ferritin, CMP, CEA and CBC with differential in 12 months.

## 2020-02-24 NOTE — PROGRESS NOTES
[facility] ONC LY  [facility]  [address]  [place] [address]  [phone]    Patient Name: Migue Rodriguez  Encounter Date: 02/27/2020  YOB: 1945  Patient Number: [id number]     REASON FOR VISIT: Mr. Migue Rodriguez is a 74-year-old male who returns in follow-up of resected Stage III colon cancer. He is seen 115.5 months since receipt of cycle 12 adjuvant FOLFOX chemotherapy. He is here alone (usually with his spouse, Ana). History is obtained from the patient who is considered to be a reliable historian.     DIAGNOSTIC ABNORMALITIES:   1. Colonoscopy, 11/08/2010: The study revealed a large polyp at about 22 cm, just above the rectosigmoid junction. This was removed via snare cautery. No other abnormalities were seen to the level of the cecum. Pathology of the colon polyp at 20 cm revealed a tubulovillous adenoma demonstrating moderate atypia. Biopsies from the rectosigmoid junction at that level also revealed fragments of tubulovillous adenoma demonstrating moderate atypia.  2. CT of the abdomen and pelvis, 11/30/2010: Revealed an eccentric mass involving the sigmoid colon. There was also circumferential thickening of the cecum suspicious for malignancy. There was prominent adjacent node seen in the mesentery just medial to the cecum. The largest measuring 1.0 x 1.5 cm in diameter. There was acentric lobular densities seen involving the sigmoid colon. The prostate appeared mildly enlarged. No other abnormalities identified.  3. Labs, 12/02/2010: BMP was normal with a creatinine 1.2, a CBC showed a hemoglobin of 9, a hematocrit 29.5, MCV 70.4, MCH 21.5, MCHC 30.5 (each depressed), platelets 530,000, WBC 7.8 with a normal differential.  4. Labs, 01/26/2011: CMP revealed eGFR of 79.5, phosphorus of 4.7. CEA of 1.4. ferritin of 12, iron of 35, TIBC of 434, iron saturation of 8%, vitamin B12 of 374, folate of 12.8, PSA of 4.2.  5. CT chest, abdomen, and pelvis  01/28/2011 [facility]. Negative chest CT. Lungs are clear. Negative upper abdominal CT. The liver is clear. Bowel pattern is normal. Benign 2.3 cm cyst upper pole left kidney. Negative pelvic CT. Previous rectal surgery. No adenopathy or free fluid noted.  6. Bone scan 01/28/2011, [facility]. Seen projected over the left ankle, there is a single tiny focus of slightly increased radiopharmaceutical uptake consistent with degenerative change. The distribution of radiopharmaceutical is otherwise within normal limits, specifically no suggestion of osseous metastatic disease.    PREVIOUS INTERVENTIONS:   1. Surgery and laparotomy, 12/06/2010. Intraoperatively, a large palpable mass was encountered in the cecum. Palpation of the liver did not demonstrate any obvious abnormalities. There was no study of the abdomen. The omentum was wrapped around the cecal mass. The tumor was described to be large enough to feel through the wall of the mucosa though there did not seem to be any transmural invasion of the tumor at the rectosigmoid junction. The mass itself was attached to the lateral and anterior abdominal wall, thus a portion of the abdominal wall was resected with the tumor. The sigmoid colon was redundant, thus a generous portion of the sigmoid colon was resected. There was a large tumor at least 2 cm within the sigmoid. The lower anterior resection and right colectomy were completed successfully. 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 The lesion infiltrated through the full thickness of the colonic wall to involve the pericolonic adipose tissue. Surgical margins of excision were negative for malignancy. One lymph node was negative for evidence of malignancy.  2. INFeD 1000 mg, 01/27/2011 and 02/03/2011 (2000 mg).  3. FOLFOX, 02/14/2011 through 07/25/2011. 12 cycles.        Problem List Items Addressed This  Visit        Digestive    Adenocarcinoma of colon (CMS/HCC) - Primary           Adenocarcinoma of colon (CMS/HCC)    2/23/2020 Initial Diagnosis     Adenocarcinoma of colon (CMS/HCC)      2/23/2020 Cancer Staged     Staging form: Colon And Rectum, AJCC 8th Edition  - Clinical stage from 2/23/2020: Stage IIIB (cT3, cN1, cM0) - Signed by Mariano Yu MD on 2/24/2020         PAST MEDICAL HISTORY:  ALLERGIES:  Allergies   Allergen Reactions   • Demerol [Meperidine]      CURRENT MEDICATIONS:  Outpatient Encounter Medications as of 2/27/2020   Medication Sig Dispense Refill   • aspirin 81 MG EC tablet Take 81 mg by mouth Daily.     • B Complex-C (SUPER B COMPLEX PO) Take  by mouth.     • CIALIS 5 MG tablet Take 1 tablet by mouth As Needed for erectile dysfunction. 30 tablet 5   • Coenzyme Q10 200 MG capsule Take 200 mg by mouth Daily.     • Loratadine 10 MG capsule Take  by mouth.     • Omega-3 Fatty Acids (FISH OIL) 1000 MG capsule capsule Take  by mouth Daily With Breakfast.     • tamsulosin (FLOMAX) 0.4 MG capsule 24 hr capsule Take 1 capsule by mouth Daily. 90 capsule 4   • rosuvastatin (CRESTOR) 20 MG tablet Take 20 mg by mouth Daily.       No facility-administered encounter medications on file as of 2/27/2020.      ADULT ILLNESSES:   Adenocarcinoma of cecum (disorder) ( ICD-10:C18.0 ;Malignant neoplasm of cecum   Benign prostatic hypertrophy ( Prostate biopsy 04/03/2012 (path benign). Dr. Mendoza following; ICD-10:N40.0 ;Enlarged prostate without lower urinary tract symptoms )   Hyperlipidemia ( ICD-10:E78.5 ;Hyperlipidemia, unspecified   Leukopenia ( ICD-10:D72.819 ;Decreased white blood cell count, unspecified   Microcytic anemia ( ICD-10:D50.9 ;Iron deficiency anemia, unspecified   Neuropathy ( ICD-10:G62.9 ;Polyneuropathy, unspecified   Peritonitis ( 1956; ICD-10:K65.9 ;Peritonitis, unspecified )   Seasonal nasal allergies ( ICD-10:J30.1 ;Allergic rhinitis due to pollen   Weight gain ( ICD-10:R63.5  ;Abnormal weight gain    SURGERIES:   Colectomy, 12/2010   Port placement, 02/07/2011. Dr. Mancini   [facility] revealed benign prostatic glands and stroma, 08/07/2012   Colonoscopy, 11/08/2010   Left inguinal hernia repair, 2004. Dr. Mayorga   Report of surveillance colonoscopy last 01/18/2016 from Dr. Mancini. Normal.   Port removal, 08/01/2016. Dr. Mancini.      ADULT ILLNESSES:  Patient Active Problem List   Diagnosis Code   • Urgency of urination R39.15   • Elevated PSA R97.20   • BPH (benign prostatic hypertrophy) with urinary obstruction N40.1, N13.8   • Hydrocele N43.3   • Adenocarcinoma of colon (CMS/HCC) C18.9   • Colon cancer (CMS/HCC) C18.9   • Other and unspecified hyperlipidemia E78.5     SURGERIES:  Past Surgical History:   Procedure Laterality Date   • APPENDECTOMY     • COLONOSCOPY     • FOOT SURGERY     • HERNIA REPAIR     • PROSTATE BIOPSY       HEALTH MAINTENANCE ITEMS:  Health Maintenance Due   Topic Date Due   • TDAP/TD VACCINES (1 - Tdap) 04/10/1955   • ZOSTER VACCINE (1 of 2) 04/10/1994   • Pneumococcal Vaccine Once at 65 Years Old  04/10/2009   • MEDICARE ANNUAL WELLNESS  08/07/2017   • COLONOSCOPY  08/07/2017   • INFLUENZA VACCINE  08/01/2019       <no information>  Last Completed Colonoscopy       Status Date      COLONOSCOPY No completions recorded          There is no immunization history on file for this patient.  Last Completed Mammogram     Patient has no health maintenance due at this time            FAMILY HISTORY:  Family History   Problem Relation Age of Onset   • No Known Problems Father    • No Known Problems Mother      SOCIAL HISTORY:  Social History     Socioeconomic History   • Marital status:      Spouse name: Not on file   • Number of children: Not on file   • Years of education: Not on file   • Highest education level: Not on file   Tobacco Use   • Smoking status: Never Smoker   • Smokeless tobacco: Never Used   Substance and Sexual  "Activity   • Alcohol use: Yes       REVIEW OF SYSTEMS:  Constitutional:   The patient's appetite and energy are fairly good. He has been active outdoors including playing golf inspite of the cold weather. He has lost 9 pounds (had gained 11.6 pounds at his 2 prior visits) since the last visit. He has no fevers, chills, or drenching night sweats. His sleep habits seem appropriate.  Ear/Nose/Throat/Mouth:   He has no ear pains but has perennial sinus allergy symptoms. He has no sore throat, nosebleeds, or sore tongue. He has no headaches. He denies any hoarseness, change in voice quality, or hemoptysis.  Ocular:   He denies eye pain, significant change in visual acuity, double vision, or blurry vision.  Respiratory:   He has no chronic cough, significant shortness of breathing, phlegm production, or unexplained chest wall pain.  Cardiovascular:   He has no exertional chest pain, chest pressure, or chest heaviness. He reports no claudication. He reports no palpitations or symptomatic orthostasis.  Gastrointestinal:   He reports no dysphagia, nausea, vomiting, postprandial abdominal pain, bloating, cramping. He has not noted any bouts of rectal bleeding after bowel movements and on toilet paper. He was seen by Dr. Mancini last 05/24/2011 for anal fissures. He reports no bouts constipation. His bowels are formed and regular, moving at least 1-2 times daily. Surveillance c-scope by Dr. Mancini last 02/2019.  \"All clear.\"  Genitourinary:   He has no urinary burning, frequency, dribbling. He reports no difficulty controlling his bladder. He needs to urinate up to 3 times through the night. He is followed by Dr. Menodza for urology. Had a prostate biopsy last 04/03/2012. Path was benign.  Musculoskeletal:   He reports no unexplained arthralgias, myalgias, but has nighttime leg cramping.  Extremities:   He reports no trouble with fluid retention or significant leg swelling.  Endocrine:   He reports no problems with excess thirst, " "excessive urination, vasomotor instability, or unexplained fatigue.  Heme/Lymphatic:   He reports no unexplained bleeding, bruising, petechial rashes, or swollen glands.  Skin:   He reports no new \"skin spots.\" Has been seen by dermatology (Dr. Alexandra in [place]) sometime 12/2019. Denies itching, rashes, or lesions which won't heal.  Neuro:   He reports no loss of consciousness, seizures, fainting spells, or dizziness. He reports no weakness of his face, arms, or legs. He has no difficulty with speech. He has no tremors. He still has minimal residual cold associated paresthesias of the hands and feet, very minimal on B complex multivitamin. \"A little.\"  Psych:   He seems generally satisfied with life. He denies depression or anxiety. He reports no mood swings.        VITAL SIGNS: /86   Pulse 71   Temp 97.1 °F (36.2 °C)   Resp 16   Ht 177.8 cm (70\")   Wt 95.1 kg (209 lb 9.6 oz)   SpO2 97%   BMI 30.07 kg/m² Body surface area is 2.13 meters squared.  Pain Score    02/27/20 1344   PainSc: 0-No pain         PHYSICAL EXAMINATION:   General:   He is a pleasant, obese, well-developed, well-nourished and well-kept elderly male who is comfortable at rest. He arrived in the exam room ambulatory. He appears to be his stated age. His skin color is normal. ECOG PS = 0 (same).  Head/Neck:   The patient is anicteric and atraumatic. The mouth, and throat are clear. The trachea is midline. The neck is supple without evidence of jugular venous distention or cervical adenopathy.  Eyes:   The pupils are equal, round, and reactive to light. The extraocular movements are full. There is no scleral jaundice or erythema.  Chest:   The respiratory efforts are normal and unhindered. The chest is clear to auscultation and percussion. There are no wheezes, rhonchi, rales, or asymmetry of breath sounds. The port in the left upper chest is well seated.  Cardiovascular:   The patient has a regular cardiac rate and rhythm without " murmurs, rubs, or gallops. The peripheral pulses are equal and full.  Abdomen:   The belly is soft and globose. The firm, nontender subcutaneous nodule just right of the epigastrium that feels cystic than solid, again measuring approximately 4 x 3 cm (grossly unchanged; previously up to 5 x 6 cm). A similar lesion of similar consistency to the left of the midline is noted, measuring approximately 4 x 3 cm (unchanged). The midline longitudinal scar is noted. There remains subtle fullness underlying an older paraumbilical scar on the right (unchanged from prior exams). There is no rebound or guarding. There is no organomegaly, mass-effect, or tenderness. Bowel sounds are active and of normal character.  Extremities:   There is no evidence of cyanosis, clubbing, or edema.  Rheumatologic:   There is no overt evidence of rheumatoid deformities of the hands. There is no sausaging of the fingers. There is no sign of active synovitis. The gait is normal.  Cutaneous:   There are no overt rashes, disseminated lesions, purpura, or petechiae.  Lymphatics:   There is no evidence of adenopathy in the cervical, supraclavicular, axillary, inguinal, or femoral areas.  Neurologic:   The patient is alert, oriented, cooperative, and pleasant. He is appropriately conversant. He ambulated into the exam room without assistance and transferred from chair to exam table unaided. There is no overt dysfunction of the motor, sensory, or cerebellar systems.  Psych:   Mood and affect are appropriate for circumstance. Eye contact is appropriate.      ASSESSMENT:   1. 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   Stage: IIIB (pT3, pN1, Mx).   Tumor [place]: Cecal mass with infiltration through the full thickness of the colonic wall to involve the pericolonic adipose tissue (T3), and 1/15 regional lymph nodes involved with metastatic carcinoma.   Complication of Tumor: None   Tumor Status: Grossly resected, 01/07/2011. Adjuvant  "systemic therapy completed.   SEBASTIAN on colonoscopy, 02/2019?  2. Microcytic anemia from iron deficiency related to the colon malignancy + chemo. Resolved, with HGB 14.4, 02/24/2020 (prior range: HGB 12.8 - 18).   3. Mildly elevated AST/ALT (statins vs fatty liver?). Normal since stopping statins.  4. Benign prostatic hypertrophy (BPH) with PSA = 4.2, 01/02/2015 (prior range: 3.7 - 4.33). Prostate biopsy, 04/03/2012 (path benign). Will defer monitoring of PSA to urology.   5. Neuropathy, chemo residual. Subjectively minimal on B-complex multivitamin.   6. Weight gain from calories greater than expenditure, gaining 4 pounds since his last visit.   7. Firm, nontender subcutaneous nodule just right of the epigastrium that feels more solid than cystic, measuring approximately 4 x 3 cm (previously 5 x 6 cm) and another just left of the midline 4 x 4 cm (same). Ultrasound (above) confirms multiple solid subcutaneous nodules, lipomas, or other benign nodules. Has been seen by Dr. Mancini.   8. Keratotic skin lesions (face and back). Followed by dermatology in [place]. Previously discussed 08/29/2014 letter from Dr. Shahid Vasquez Re: Patient did not keep 08/21/2014 appointment. Said that he saw a \"skin doctor\" in [place], now thinks it is Dr. Conklin, was told everything alright and to come back in 1 year.     PLAN:   1.  Re: Apprised of labs from 02/14/2020 with normal CBC repleted iron levels, normal AST/ALT otherwise normal CMP, and normal CEA.   2. Obtain report of c-scope last 02/2019- Dr. Mancini  3. The role of adjuvant chemotherapy was previously discussed. I had explained that benefit has been unequivocally demonstrated (reduction in recurrence and mortality) for patients with resected Stage III colorectal cancer. The benefits of adjuvant chemotherapy (adjusted for age, tumor grade, lymph node involvement, and type of chemo regimen) were specifically outlined:  a. Reduction in mortality: 48%. 5 year absolute benefit: " 6.2/14.4 alive.  b. Reduction in recurrence: 48%. 5 year absolute benefit: 7.8/18 without relapse.  4. Observe  5. Continue other currently identified medications.  6. Continue ongoing management per primary care, and other specialists.   7. Advance Directive discussed.  8. Return to the [place] office with pre-office (specify fasting) iron, fe sat, ferritin, CMP, CEA and CBC with differential in 12 months.    MEDICAL DECISION MAKING: Moderate Complexity   AMOUNT OF DATA: Moderate   RISK OF COMPLICATIONS: Low     TIME SPENT: Face-to-face time on this encounter, as defined by the American Medical Association in the 2020 Current Procedural Terminology codebook; assessment, record review, lab review, planning and education - 25 minutes (> 50% face to face).     cc: MD Bladimir Rooney MD

## 2020-02-24 NOTE — PROGRESS NOTES
Venipuncture Blood Specimen Collection  Venipuncture performed in left ac by Salvatore Dai RN with good hemostasis. Patient tolerated the procedure well without complications.   02/24/20   Salvatore Dai RN

## 2020-02-24 NOTE — TELEPHONE ENCOUNTER
Spoke with patient. He did not remember his appt. He will go to have labs drawn today at our [facility].

## 2020-02-25 LAB
ALBUMIN SERPL-MCNC: 4 G/DL (ref 3.7–4.7)
ALBUMIN/GLOB SERPL: 2 {RATIO} (ref 1.2–2.2)
ALP SERPL-CCNC: 78 IU/L (ref 39–117)
ALT SERPL-CCNC: 34 IU/L (ref 0–44)
AST SERPL-CCNC: 28 IU/L (ref 0–40)
BASOPHILS # BLD AUTO: 0 X10E3/UL (ref 0–0.2)
BASOPHILS NFR BLD AUTO: 1 %
BILIRUB SERPL-MCNC: 0.5 MG/DL (ref 0–1.2)
BUN SERPL-MCNC: 15 MG/DL (ref 8–27)
BUN/CREAT SERPL: 17 (ref 10–24)
CALCIUM SERPL-MCNC: 8.8 MG/DL (ref 8.6–10.2)
CEA SERPL-MCNC: 1.5 NG/ML (ref 0–4.7)
CHLORIDE SERPL-SCNC: 107 MMOL/L (ref 96–106)
CO2 SERPL-SCNC: 23 MMOL/L (ref 20–29)
CREAT SERPL-MCNC: 0.9 MG/DL (ref 0.76–1.27)
EOSINOPHIL # BLD AUTO: 0.1 X10E3/UL (ref 0–0.4)
EOSINOPHIL NFR BLD AUTO: 1 %
ERYTHROCYTE [DISTWIDTH] IN BLOOD BY AUTOMATED COUNT: 11.8 % (ref 11.6–15.4)
FERRITIN SERPL-MCNC: 316 NG/ML (ref 30–400)
GLOBULIN SER CALC-MCNC: 2 G/DL (ref 1.5–4.5)
GLUCOSE SERPL-MCNC: 110 MG/DL (ref 65–99)
HCT VFR BLD AUTO: 42.1 % (ref 37.5–51)
HGB BLD-MCNC: 14.4 G/DL (ref 13–17.7)
IMM GRANULOCYTES # BLD AUTO: 0 X10E3/UL (ref 0–0.1)
IMM GRANULOCYTES NFR BLD AUTO: 0 %
IRON SATN MFR SERPL: 34 % (ref 15–55)
IRON SERPL-MCNC: 100 UG/DL (ref 38–169)
LYMPHOCYTES # BLD AUTO: 1.2 X10E3/UL (ref 0.7–3.1)
LYMPHOCYTES NFR BLD AUTO: 22 %
MCH RBC QN AUTO: 32.4 PG (ref 26.6–33)
MCHC RBC AUTO-ENTMCNC: 34.2 G/DL (ref 31.5–35.7)
MCV RBC AUTO: 95 FL (ref 79–97)
MONOCYTES # BLD AUTO: 0.3 X10E3/UL (ref 0.1–0.9)
MONOCYTES NFR BLD AUTO: 5 %
NEUTROPHILS # BLD AUTO: 4.1 X10E3/UL (ref 1.4–7)
NEUTROPHILS NFR BLD AUTO: 71 %
PLATELET # BLD AUTO: 239 X10E3/UL (ref 150–450)
POTASSIUM SERPL-SCNC: 4.1 MMOL/L (ref 3.5–5.2)
PROT SERPL-MCNC: 6 G/DL (ref 6–8.5)
RBC # BLD AUTO: 4.44 X10E6/UL (ref 4.14–5.8)
SODIUM SERPL-SCNC: 143 MMOL/L (ref 134–144)
TIBC SERPL-MCNC: 293 UG/DL (ref 250–450)
UIBC SERPL-MCNC: 193 UG/DL (ref 111–343)
WBC # BLD AUTO: 5.7 X10E3/UL (ref 3.4–10.8)

## 2020-02-27 ENCOUNTER — OFFICE VISIT (OUTPATIENT)
Dept: ONCOLOGY | Facility: CLINIC | Age: 75
End: 2020-02-27

## 2020-02-27 VITALS
TEMPERATURE: 97.1 F | WEIGHT: 209.6 LBS | RESPIRATION RATE: 16 BRPM | HEART RATE: 71 BPM | OXYGEN SATURATION: 97 % | BODY MASS INDEX: 30.01 KG/M2 | SYSTOLIC BLOOD PRESSURE: 134 MMHG | HEIGHT: 70 IN | DIASTOLIC BLOOD PRESSURE: 86 MMHG

## 2020-02-27 DIAGNOSIS — C18.9 ADENOCARCINOMA OF COLON (HCC): Primary | ICD-10-CM

## 2020-02-27 PROCEDURE — 99214 OFFICE O/P EST MOD 30 MIN: CPT | Performed by: INTERNAL MEDICINE

## 2020-05-30 DIAGNOSIS — R39.15 URGENCY OF URINATION: ICD-10-CM

## 2020-06-01 RX ORDER — TAMSULOSIN HYDROCHLORIDE 0.4 MG/1
CAPSULE ORAL
Qty: 90 CAPSULE | Refills: 4 | Status: SHIPPED | OUTPATIENT
Start: 2020-06-01 | End: 2020-07-20 | Stop reason: SDUPTHER

## 2020-07-13 NOTE — PROGRESS NOTES
Subjective    Mr. Rodriguez is 75 y.o. male    Chief Complaint :BPH    History of Present Illness  74yo male established patient annual followup for history of enlarged prostate and ED.  Quality loss of function.  Severity stable.  Onset gradual.  Context he would like to stop daily Cialis and go to prn dosing.  LUTS well controlled on tamsulosin.  He denies gross hematuria or flank pain. He also has a right sided hydrocele     I spent time today reviewing and summarizing old records last urology clinic visit with Dr. Mendoza 11/8/17 and nurse practitioner 6/11/19.     Reviewed and updated as appropriate: allergies, current medications, past family history, past medical history, past social history, past surgical history and problem list.    Review of Systems   Constitutional: Negative for chills and fever.   Gastrointestinal: Negative for abdominal pain, anal bleeding and blood in stool.   Genitourinary: Negative for dysuria, frequency, hematuria and urgency.   All other systems reviewed and are negative.        Current Outpatient Medications:   •  aspirin 81 MG EC tablet, Take 81 mg by mouth Daily., Disp: , Rfl:   •  B Complex-C (SUPER B COMPLEX PO), Take  by mouth., Disp: , Rfl:   •  Coenzyme Q10 200 MG capsule, Take 200 mg by mouth Daily., Disp: , Rfl:   •  Omega-3 Fatty Acids (FISH OIL) 1000 MG capsule capsule, Take  by mouth Daily With Breakfast., Disp: , Rfl:   •  tamsulosin (FLOMAX) 0.4 MG capsule 24 hr capsule, Take 1 capsule by mouth Daily. nightly, Disp: 90 capsule, Rfl: 4  •  finasteride (PROSCAR) 5 MG tablet, Take 1 tablet by mouth Daily., Disp: 90 tablet, Rfl: 3  •  Loratadine 10 MG capsule, Take  by mouth., Disp: , Rfl:   •  tadalafil (Cialis) 20 MG tablet, Take 1 tablet by mouth As Needed for Erectile Dysfunction., Disp: 10 tablet, Rfl: 11    Past Medical History:   Diagnosis Date   • BPH with urinary obstruction    • Colon cancer (CMS/HCC)    • Elevated PSA    • Frequency of urination    • Hydrocele   "  • Hyperlipemia    • Peritonitis (CMS/HCC)    • Testalgia        Past Surgical History:   Procedure Laterality Date   • APPENDECTOMY     • COLONOSCOPY     • FOOT SURGERY     • HERNIA REPAIR     • PROSTATE BIOPSY         Social History     Socioeconomic History   • Marital status:      Spouse name: Not on file   • Number of children: Not on file   • Years of education: Not on file   • Highest education level: Not on file   Tobacco Use   • Smoking status: Never Smoker   • Smokeless tobacco: Never Used   Substance and Sexual Activity   • Alcohol use: Yes       Family History   Problem Relation Age of Onset   • No Known Problems Father    • No Known Problems Mother        Objective    Temp 97.7 °F (36.5 °C)   Ht 177.8 cm (70\")   Wt 89.8 kg (198 lb)   BMI 28.41 kg/m²     Physical Exam  Constitutional: Well nourished, Well developed; No apparent distress.  His vital signs are reviewed  Psychiatric: Appropriate affect; Alert and oriented  Eyes: Unremarkable  Musculoskeletal: Normal gait and station  GI: Abdomen is soft, non-tender  Respiratory: No distress; Unlabored movement; No accessory musculature needed with symmetric movements  Skin: No pallor or diaphoresis  ; Penis and testicles are normal; Prostate 100 mL without nodule      Results for orders placed or performed in visit on 07/20/20   POC Urinalysis Dipstick, Multipro   Result Value Ref Range    Color Yellow Yellow, Straw, Dark Yellow, Cori    Clarity, UA Clear Clear    Glucose, UA Negative Negative, 1000 mg/dL (3+) mg/dL    Bilirubin Negative Negative    Ketones, UA Negative Negative    Specific Gravity  1.025 1.005 - 1.030    Blood, UA Negative Negative    pH, Urine 6.5 5.0 - 8.0    Protein, POC Negative Negative mg/dL    Urobilinogen, UA Normal Normal    Nitrite, UA Negative Negative    Leukocytes Negative Negative     International Prostate Symptom Score  The following is posted based on patient questionnaire answers:  0 - not at all    1-7 mild " symptoms  1- Less than one time in five  8-19 moderate symptoms  2 -Less than half the time  20-35 severe symptoms  3 - About half the time  4 - More than half the time  5 - Almost always     For following sections:  Incomplete Emptying: - How often have you had the sensation  of not emptying your bladder completely after you finished urinating?  3  Frequency: -How often have you had to urinate again less than   two hours after you finished urinating?      3  Intermittency: -How often have you found you stopped and started again  Several times when you urinate?       3  Urgency: -How often do you find it difficult to postpone urination?             2  Weak stream: - How often have you had a weak urinary stream?             2  Straining: - How often have you had to push or strain to begin  Urination?          2  Sleeping: -How many times did you most typically get up to urinate   From the time you went to bed at night until the time you got up in the   3  Morning          Total `  18    Quality of Life  How would you feel if you had to live with your urinary condition the way   2  It is now, no better, no worse for the rest of your life?    Where: 0=delighted; 1= pleased, 2= mostly satisfied, 3= mixed, 4 = mostly  Dissatisfied, 5= Unhappy, 6 = terrible    Assessment and Plan    Diagnoses and all orders for this visit:    Benign prostatic hyperplasia with lower urinary tract symptoms, symptom details unspecified  -     POC Urinalysis Dipstick, Multipro  -     finasteride (PROSCAR) 5 MG tablet; Take 1 tablet by mouth Daily.    Urgency of urination  -     tamsulosin (FLOMAX) 0.4 MG capsule 24 hr capsule; Take 1 capsule by mouth Daily. nightly    Erectile dysfunction due to diseases classified elsewhere  -     tadalafil (Cialis) 20 MG tablet; Take 1 tablet by mouth As Needed for Erectile Dysfunction.    LUTS well controlled on tamsulosin. Will change to Cialis prn.  He would like to add finasteride for combination  therapy given his very large gland on exam.  Followup with me in 1 yr or sooner as needed       This document has been signed by ELEANOR Hudson MD on July 20, 2020 16:17

## 2020-07-20 ENCOUNTER — OFFICE VISIT (OUTPATIENT)
Dept: UROLOGY | Facility: CLINIC | Age: 75
End: 2020-07-20

## 2020-07-20 VITALS — TEMPERATURE: 97.7 F | BODY MASS INDEX: 28.35 KG/M2 | HEIGHT: 70 IN | WEIGHT: 198 LBS

## 2020-07-20 DIAGNOSIS — N40.1 BENIGN PROSTATIC HYPERPLASIA WITH LOWER URINARY TRACT SYMPTOMS, SYMPTOM DETAILS UNSPECIFIED: Primary | ICD-10-CM

## 2020-07-20 DIAGNOSIS — N52.1 ERECTILE DYSFUNCTION DUE TO DISEASES CLASSIFIED ELSEWHERE: ICD-10-CM

## 2020-07-20 DIAGNOSIS — N43.0 ENCYSTED HYDROCELE: ICD-10-CM

## 2020-07-20 DIAGNOSIS — N13.8 BENIGN PROSTATIC HYPERPLASIA WITH URINARY OBSTRUCTION: ICD-10-CM

## 2020-07-20 DIAGNOSIS — R39.15 URGENCY OF URINATION: ICD-10-CM

## 2020-07-20 DIAGNOSIS — N40.1 BENIGN PROSTATIC HYPERPLASIA WITH URINARY OBSTRUCTION: ICD-10-CM

## 2020-07-20 LAB
BILIRUB BLD-MCNC: NEGATIVE MG/DL
CLARITY, POC: CLEAR
COLOR UR: YELLOW
GLUCOSE UR STRIP-MCNC: NEGATIVE MG/DL
KETONES UR QL: NEGATIVE
LEUKOCYTE EST, POC: NEGATIVE
NITRITE UR-MCNC: NEGATIVE MG/ML
PH UR: 6.5 [PH] (ref 5–8)
PROT UR STRIP-MCNC: NEGATIVE MG/DL
RBC # UR STRIP: NEGATIVE /UL
SP GR UR: 1.02 (ref 1–1.03)
UROBILINOGEN UR QL: NORMAL

## 2020-07-20 PROCEDURE — 99214 OFFICE O/P EST MOD 30 MIN: CPT | Performed by: UROLOGY

## 2020-07-20 PROCEDURE — 81003 URINALYSIS AUTO W/O SCOPE: CPT | Performed by: UROLOGY

## 2020-07-20 RX ORDER — TADALAFIL 20 MG/1
20 TABLET ORAL AS NEEDED
Qty: 10 TABLET | Refills: 11 | Status: SHIPPED | OUTPATIENT
Start: 2020-07-20 | End: 2021-06-24 | Stop reason: SDUPTHER

## 2020-07-20 RX ORDER — FINASTERIDE 5 MG/1
5 TABLET, FILM COATED ORAL DAILY
Qty: 90 TABLET | Refills: 3 | Status: SHIPPED | OUTPATIENT
Start: 2020-07-20 | End: 2021-06-24 | Stop reason: SDUPTHER

## 2020-07-20 RX ORDER — TAMSULOSIN HYDROCHLORIDE 0.4 MG/1
1 CAPSULE ORAL DAILY
Qty: 90 CAPSULE | Refills: 4 | Status: SHIPPED | OUTPATIENT
Start: 2020-07-20 | End: 2021-06-24 | Stop reason: SDUPTHER

## 2020-12-15 ENCOUNTER — OFFICE VISIT (OUTPATIENT)
Dept: FAMILY MEDICINE CLINIC | Age: 75
End: 2020-12-15
Payer: MEDICARE

## 2020-12-15 VITALS
SYSTOLIC BLOOD PRESSURE: 144 MMHG | TEMPERATURE: 97 F | OXYGEN SATURATION: 97 % | WEIGHT: 202.6 LBS | HEART RATE: 69 BPM | DIASTOLIC BLOOD PRESSURE: 80 MMHG | HEIGHT: 70 IN | BODY MASS INDEX: 29.01 KG/M2 | RESPIRATION RATE: 16 BRPM

## 2020-12-15 PROCEDURE — G8484 FLU IMMUNIZE NO ADMIN: HCPCS | Performed by: FAMILY MEDICINE

## 2020-12-15 PROCEDURE — 4040F PNEUMOC VAC/ADMIN/RCVD: CPT | Performed by: FAMILY MEDICINE

## 2020-12-15 PROCEDURE — 99214 OFFICE O/P EST MOD 30 MIN: CPT | Performed by: FAMILY MEDICINE

## 2020-12-15 PROCEDURE — 1123F ACP DISCUSS/DSCN MKR DOCD: CPT | Performed by: FAMILY MEDICINE

## 2020-12-15 PROCEDURE — 1036F TOBACCO NON-USER: CPT | Performed by: FAMILY MEDICINE

## 2020-12-15 PROCEDURE — G8427 DOCREV CUR MEDS BY ELIG CLIN: HCPCS | Performed by: FAMILY MEDICINE

## 2020-12-15 PROCEDURE — G8417 CALC BMI ABV UP PARAM F/U: HCPCS | Performed by: FAMILY MEDICINE

## 2020-12-15 RX ORDER — FINASTERIDE 5 MG/1
5 TABLET, FILM COATED ORAL DAILY
COMMUNITY

## 2020-12-15 RX ORDER — PROPRANOLOL HYDROCHLORIDE 40 MG/1
40 TABLET ORAL 2 TIMES DAILY
Qty: 60 TABLET | Refills: 5 | Status: SHIPPED | OUTPATIENT
Start: 2020-12-15 | End: 2021-10-11

## 2020-12-15 ASSESSMENT — ENCOUNTER SYMPTOMS
NAUSEA: 0
CONSTIPATION: 0
WHEEZING: 0
COUGH: 0
CHEST TIGHTNESS: 0
VOMITING: 0
EYES NEGATIVE: 1
SHORTNESS OF BREATH: 0
DIARRHEA: 0
BLOOD IN STOOL: 0

## 2020-12-15 NOTE — PROGRESS NOTES
Subjective:      Patient ID: Corey Trevino is a 67 y.o. male. HPI  This patient is seen here infrequently by the undersigned for management of chronic disease. Additionally, we do see him as new problems arise. He comes in today actually having a couple of issues of concern. He states that he wants a checkup but he has noted that he has some increase in tremors. Blood pressure has been elevated some in the past as well. Initially, blood pressure here was 160/70. Upon check it was noted to be 170/54. Final blood pressure was noted at 140/80. Heart rate was said to be 69. The patient was noted to have some fine tremor but not to a significant extent while here today. It does not seem to bother him when he is either trying to write or eat or drink coffee. He does not drink a lot of coffee usually a couple of cups a day. He has not had fasting labs done for quite some time. He does have some periodic labs done at hematology oncology. He will be having labs here in February. He does have a history of colon cancer previously treated. My understanding is that he does have a Port-A-Cath in place. He has have free and total PSA done by Dr. Rene Loyd at urology clinic. Will come in someday in the near future we will do fasting labs including CBC, BMP, lipids, free T4, TSH, urinalysis, hepatic function panel, vitamin D level, and he will not have PSA since that is done at urology clinic. For symptoms of BPH, he is on Proscar 5 mg p.o. daily. Additionally, he does take Flomax 0.4 at 1 p.o. daily. He has had some history of issues with taking vitamins in the past and he does have a history of low vitamin D. We will be checking that as well on the labs noted above. Review of Systems   Constitutional: Negative. HENT: Negative. Eyes: Negative. Respiratory: Negative for cough, chest tightness, shortness of breath and wheezing.     Cardiovascular: Negative for chest pain, palpitations and leg swelling. Gastrointestinal: Negative for blood in stool, constipation, diarrhea, nausea and vomiting. Genitourinary: Negative for hematuria. Skin: Negative. Neurological: Positive for tremors. Psychiatric/Behavioral: Negative. Objective:   Physical Exam  Vitals signs and nursing note reviewed. Constitutional:       General: He is not in acute distress. Appearance: Normal appearance. He is well-developed. He is not ill-appearing, toxic-appearing or diaphoretic. HENT:      Head: Normocephalic and atraumatic. Right Ear: Tympanic membrane, ear canal and external ear normal. There is no impacted cerumen. Left Ear: Tympanic membrane, ear canal and external ear normal. There is no impacted cerumen. Nose: Nose normal.      Mouth/Throat:      Lips: Pink. Mouth: Mucous membranes are moist.      Dentition: Normal dentition. Tongue: No lesions. Pharynx: Oropharynx is clear. Uvula midline. Tonsils: No tonsillar exudate or tonsillar abscesses. Eyes:      General: Lids are normal. No scleral icterus. Right eye: No discharge. Left eye: No discharge. Extraocular Movements:      Right eye: Normal extraocular motion. Left eye: Normal extraocular motion. Conjunctiva/sclera: Conjunctivae normal.      Right eye: Right conjunctiva is not injected. Left eye: Left conjunctiva is not injected. Pupils: Pupils are equal, round, and reactive to light. Neck:      Musculoskeletal: Normal range of motion and neck supple. No neck rigidity or muscular tenderness. Thyroid: No thyromegaly. Vascular: No carotid bruit or JVD. Cardiovascular:      Rate and Rhythm: Normal rate and regular rhythm. Pulses:           Carotid pulses are 2+ on the right side and 2+ on the left side. Radial pulses are 2+ on the right side and 2+ on the left side. Heart sounds: Normal heart sounds, S1 normal and S2 normal. No murmur.  No friction rub. No gallop. Pulmonary:      Effort: Pulmonary effort is normal. No accessory muscle usage or respiratory distress. Breath sounds: Normal breath sounds. No stridor. No wheezing, rhonchi or rales. Chest:      Chest wall: No tenderness. Abdominal:      General: Bowel sounds are normal. There is no distension or abdominal bruit. Palpations: Abdomen is soft. There is no mass. Tenderness: There is no abdominal tenderness. There is no right CVA tenderness, left CVA tenderness, guarding or rebound. Hernia: No hernia is present. Musculoskeletal: Normal range of motion. General: No swelling, tenderness, deformity or signs of injury. Right lower leg: No edema. Left lower leg: No edema. Lymphadenopathy:      Cervical: No cervical adenopathy. Right cervical: No superficial cervical adenopathy. Left cervical: No superficial cervical adenopathy. Skin:     General: Skin is warm and dry. Nails: There is no clubbing. Neurological:      General: No focal deficit present. Mental Status: He is alert and oriented to person, place, and time. Mental status is at baseline. Cranial Nerves: No facial asymmetry. Motor: No weakness or tremor. Coordination: Coordination normal.      Gait: Gait normal.      Deep Tendon Reflexes: Reflexes are normal and symmetric. Psychiatric:         Attention and Perception: Attention normal.         Mood and Affect: Mood normal.         Speech: Speech normal.         Behavior: Behavior normal.         Thought Content: Thought content normal.         Cognition and Memory: Memory normal.         Judgment: Judgment normal.         BP (!) 144/80   Pulse 69   Temp 97 °F (36.1 °C) (Infrared)   Resp 16   Ht 5' 10\" (1.778 m)   Wt 202 lb 9.6 oz (91.9 kg)   SpO2 97%   BMI 29.07 kg/m²   Assessment:         Diagnosis Orders   1.  Hyperlipidemia, unspecified hyperlipidemia type  Hepatic Function Panel    T4, Free    TSH know of any disorder that is said to involve neurological problems associated to utilization of embalming  fluid substances.         Noemi Navarrete MD Yes

## 2020-12-16 DIAGNOSIS — E55.9 VITAMIN D DEFICIENCY: ICD-10-CM

## 2020-12-16 DIAGNOSIS — E78.5 HYPERLIPIDEMIA, UNSPECIFIED HYPERLIPIDEMIA TYPE: ICD-10-CM

## 2020-12-16 DIAGNOSIS — I10 ESSENTIAL HYPERTENSION: ICD-10-CM

## 2020-12-16 LAB
ALBUMIN SERPL-MCNC: 4.2 G/DL (ref 3.5–5.2)
ALP BLD-CCNC: 87 U/L (ref 40–130)
ALT SERPL-CCNC: 30 U/L (ref 5–41)
ANION GAP SERPL CALCULATED.3IONS-SCNC: 9 MMOL/L (ref 7–19)
AST SERPL-CCNC: 26 U/L (ref 5–40)
BILIRUB SERPL-MCNC: 0.7 MG/DL (ref 0.2–1.2)
BILIRUBIN DIRECT: 0.2 MG/DL (ref 0–0.3)
BILIRUBIN URINE: NEGATIVE
BILIRUBIN, INDIRECT: 0.5 MG/DL (ref 0.1–1)
BLOOD, URINE: NEGATIVE
BUN BLDV-MCNC: 13 MG/DL (ref 8–23)
CALCIUM SERPL-MCNC: 9.3 MG/DL (ref 8.8–10.2)
CHLORIDE BLD-SCNC: 106 MMOL/L (ref 98–111)
CHOLESTEROL, TOTAL: 188 MG/DL (ref 160–199)
CLARITY: CLEAR
CO2: 27 MMOL/L (ref 22–29)
COLOR: YELLOW
CREAT SERPL-MCNC: 0.9 MG/DL (ref 0.5–1.2)
GFR AFRICAN AMERICAN: >59
GFR NON-AFRICAN AMERICAN: >60
GLUCOSE BLD-MCNC: 118 MG/DL (ref 74–109)
GLUCOSE URINE: NEGATIVE MG/DL
HCT VFR BLD CALC: 48.5 % (ref 42–52)
HDLC SERPL-MCNC: 50 MG/DL (ref 55–121)
HEMOGLOBIN: 15.9 G/DL (ref 14–18)
KETONES, URINE: NEGATIVE MG/DL
LDL CHOLESTEROL CALCULATED: 119 MG/DL
LEUKOCYTE ESTERASE, URINE: NEGATIVE
MCH RBC QN AUTO: 32.6 PG (ref 27–31)
MCHC RBC AUTO-ENTMCNC: 32.8 G/DL (ref 33–37)
MCV RBC AUTO: 99.4 FL (ref 80–94)
NITRITE, URINE: NEGATIVE
PDW BLD-RTO: 12.6 % (ref 11.5–14.5)
PH UA: 7 (ref 5–8)
PLATELET # BLD: 229 K/UL (ref 130–400)
PMV BLD AUTO: 10 FL (ref 9.4–12.4)
POTASSIUM SERPL-SCNC: 4.8 MMOL/L (ref 3.5–5)
PROTEIN UA: NEGATIVE MG/DL
RBC # BLD: 4.88 M/UL (ref 4.7–6.1)
SODIUM BLD-SCNC: 142 MMOL/L (ref 136–145)
SPECIFIC GRAVITY UA: 1.01 (ref 1–1.03)
T4 FREE: 1.03 NG/DL (ref 0.93–1.7)
TOTAL PROTEIN: 6.7 G/DL (ref 6.6–8.7)
TRIGL SERPL-MCNC: 96 MG/DL (ref 0–149)
TSH SERPL DL<=0.05 MIU/L-ACNC: 3.02 UIU/ML (ref 0.27–4.2)
UROBILINOGEN, URINE: 0.2 E.U./DL
VITAMIN D 25-HYDROXY: 23.4 NG/ML
WBC # BLD: 4.8 K/UL (ref 4.8–10.8)

## 2020-12-23 ENCOUNTER — TELEPHONE (OUTPATIENT)
Dept: FAMILY MEDICINE CLINIC | Age: 75
End: 2020-12-23

## 2020-12-28 ENCOUNTER — TELEPHONE (OUTPATIENT)
Dept: FAMILY MEDICINE CLINIC | Age: 75
End: 2020-12-28

## 2020-12-28 NOTE — TELEPHONE ENCOUNTER
----- Message from Clarita Garcia RN sent at 12/23/2020  3:11 PM CST -----    ----- Message -----  From: Joby Varghese MD  Sent: 12/22/2020   9:51 AM CST  To: Clarita Garcia RN    Urinalysis is totally normal.  Vitamin D level is low at 23.4. Patient should be on vitamin D supplementation with ergocalciferol 50,000 units taken 1 day weekly. Thyroid functions were normal.  Total cholesterol was good at 188. Triglycerides normal at 96. HDL just a bit low at 50. Liver function study is all normal.  Sugar was very slightly elevated at 118 but this is better than it was 11 months ago when it was 120. Continue to watch sweets and do some walking. Renal function is normal.  Electrolytes are normal.  CBC is unremarkable.

## 2020-12-29 RX ORDER — ERGOCALCIFEROL 1.25 MG/1
50000 CAPSULE ORAL WEEKLY
Qty: 12 CAPSULE | Refills: 1 | Status: SHIPPED | OUTPATIENT
Start: 2020-12-29 | End: 2021-11-11

## 2021-02-18 ENCOUNTER — APPOINTMENT (OUTPATIENT)
Dept: LAB | Facility: HOSPITAL | Age: 76
End: 2021-02-18

## 2021-02-19 ENCOUNTER — LAB (OUTPATIENT)
Dept: LAB | Facility: HOSPITAL | Age: 76
End: 2021-02-19

## 2021-02-19 DIAGNOSIS — C18.9 ADENOCARCINOMA OF COLON (HCC): ICD-10-CM

## 2021-02-19 LAB
ALBUMIN SERPL-MCNC: 4.1 G/DL (ref 3.5–5.2)
ALBUMIN/GLOB SERPL: 1.5 G/DL
ALP SERPL-CCNC: 77 U/L (ref 39–117)
ALT SERPL W P-5'-P-CCNC: 27 U/L (ref 1–41)
ANION GAP SERPL CALCULATED.3IONS-SCNC: 5 MMOL/L (ref 5–15)
AST SERPL-CCNC: 24 U/L (ref 1–40)
BASOPHILS # BLD AUTO: 0.04 10*3/MM3 (ref 0–0.2)
BASOPHILS NFR BLD AUTO: 0.8 % (ref 0–1.5)
BILIRUB SERPL-MCNC: 0.8 MG/DL (ref 0–1.2)
BUN SERPL-MCNC: 16 MG/DL (ref 8–23)
BUN/CREAT SERPL: 18.2 (ref 7–25)
CALCIUM SPEC-SCNC: 9.4 MG/DL (ref 8.6–10.5)
CEA SERPL-MCNC: 1.64 NG/ML
CHLORIDE SERPL-SCNC: 107 MMOL/L (ref 98–107)
CO2 SERPL-SCNC: 28 MMOL/L (ref 22–29)
CREAT SERPL-MCNC: 0.88 MG/DL (ref 0.76–1.27)
DEPRECATED RDW RBC AUTO: 43 FL (ref 37–54)
EOSINOPHIL # BLD AUTO: 0.12 10*3/MM3 (ref 0–0.4)
EOSINOPHIL NFR BLD AUTO: 2.3 % (ref 0.3–6.2)
ERYTHROCYTE [DISTWIDTH] IN BLOOD BY AUTOMATED COUNT: 12.5 % (ref 12.3–15.4)
FERRITIN SERPL-MCNC: 599.2 NG/ML (ref 30–400)
GFR SERPL CREATININE-BSD FRML MDRD: 84 ML/MIN/1.73
GLOBULIN UR ELPH-MCNC: 2.8 GM/DL
GLUCOSE SERPL-MCNC: 113 MG/DL (ref 65–99)
HCT VFR BLD AUTO: 42.2 % (ref 37.5–51)
HGB BLD-MCNC: 15.2 G/DL (ref 13–17.7)
IMM GRANULOCYTES # BLD AUTO: 0.01 10*3/MM3 (ref 0–0.05)
IMM GRANULOCYTES NFR BLD AUTO: 0.2 % (ref 0–0.5)
IRON 24H UR-MRATE: 131 MCG/DL (ref 59–158)
IRON SATN MFR SERPL: 35 % (ref 20–50)
LYMPHOCYTES # BLD AUTO: 1.59 10*3/MM3 (ref 0.7–3.1)
LYMPHOCYTES NFR BLD AUTO: 29.8 % (ref 19.6–45.3)
MCH RBC QN AUTO: 33.8 PG (ref 26.6–33)
MCHC RBC AUTO-ENTMCNC: 36 G/DL (ref 31.5–35.7)
MCV RBC AUTO: 93.8 FL (ref 79–97)
MONOCYTES # BLD AUTO: 0.46 10*3/MM3 (ref 0.1–0.9)
MONOCYTES NFR BLD AUTO: 8.6 % (ref 5–12)
NEUTROPHILS NFR BLD AUTO: 3.11 10*3/MM3 (ref 1.7–7)
NEUTROPHILS NFR BLD AUTO: 58.3 % (ref 42.7–76)
NRBC BLD AUTO-RTO: 0 /100 WBC (ref 0–0.2)
PLATELET # BLD AUTO: 206 10*3/MM3 (ref 140–450)
PMV BLD AUTO: 9.4 FL (ref 6–12)
POTASSIUM SERPL-SCNC: 4.4 MMOL/L (ref 3.5–5.2)
PROT SERPL-MCNC: 6.9 G/DL (ref 6–8.5)
RBC # BLD AUTO: 4.5 10*6/MM3 (ref 4.14–5.8)
SODIUM SERPL-SCNC: 140 MMOL/L (ref 136–145)
TIBC SERPL-MCNC: 378 MCG/DL (ref 298–536)
TRANSFERRIN SERPL-MCNC: 254 MG/DL (ref 200–360)
WBC # BLD AUTO: 5.33 10*3/MM3 (ref 3.4–10.8)

## 2021-02-19 PROCEDURE — 80053 COMPREHEN METABOLIC PANEL: CPT

## 2021-02-19 PROCEDURE — 83540 ASSAY OF IRON: CPT

## 2021-02-19 PROCEDURE — 82728 ASSAY OF FERRITIN: CPT

## 2021-02-19 PROCEDURE — 85025 COMPLETE CBC W/AUTO DIFF WBC: CPT

## 2021-02-19 PROCEDURE — 84466 ASSAY OF TRANSFERRIN: CPT

## 2021-02-19 PROCEDURE — 82378 CARCINOEMBRYONIC ANTIGEN: CPT

## 2021-02-20 NOTE — PROGRESS NOTES
[facility]  [facility]  [address]  [place] [address]  [phone]    Patient Name: Migue Rodriguez  Encounter Date: 02/24/2021  YOB: 1945  Patient Number: [id number]       REASON FOR VISIT: Mr. Migue Rodriguez is a 75-year-old male who returns in follow-up of resected Stage III colon cancer. He is seen 127.5 months since receipt of cycle 12 adjuvant FOLFOX chemotherapy. He is here alone (usually with his spouse, Ana).      DIAGNOSTIC ABNORMALITIES:   1. Colonoscopy, 11/08/2010: The study revealed a large polyp at about 22 cm, just above the rectosigmoid junction. This was removed via snare cautery. No other abnormalities were seen to the level of the cecum. Pathology of the colon polyp at 20 cm revealed a tubulovillous adenoma demonstrating moderate atypia. Biopsies from the rectosigmoid junction at that level also revealed fragments of tubulovillous adenoma demonstrating moderate atypia.  2. CT of the abdomen and pelvis, 11/30/2010: Revealed an eccentric mass involving the sigmoid colon. There was also circumferential thickening of the cecum suspicious for malignancy. There was prominent adjacent node seen in the mesentery just medial to the cecum. The largest measuring 1.0 x 1.5 cm in diameter. There was acentric lobular densities seen involving the sigmoid colon. The prostate appeared mildly enlarged. No other abnormalities identified.  3. Labs, 12/02/2010: BMP was normal with a creatinine 1.2, a CBC showed a hemoglobin of 9, a hematocrit 29.5, MCV 70.4, MCH 21.5, MCHC 30.5 (each depressed), platelets 530,000, WBC 7.8 with a normal differential.  4. Labs, 01/26/2011: CMP revealed eGFR of 79.5, phosphorus of 4.7. CEA of 1.4. ferritin of 12, iron of 35, TIBC of 434, iron saturation of 8%, vitamin B12 of 374, folate of 12.8, PSA of 4.2.  5. CT chest, abdomen, and pelvis 01/28/2011 [facility]. Negative chest CT. Lungs are clear. Negative upper  abdominal CT. The liver is clear. Bowel pattern is normal. Benign 2.3 cm cyst upper pole left kidney. Negative pelvic CT. Previous rectal surgery. No adenopathy or free fluid noted.  6. Bone scan 01/28/2011, [facility]. Seen projected over the left ankle, there is a single tiny focus of slightly increased radiopharmaceutical uptake consistent with degenerative change. The distribution of radiopharmaceutical is otherwise within normal limits, specifically no suggestion of osseous metastatic disease.    PREVIOUS INTERVENTIONS:   1. Surgery and laparotomy, 12/06/2010. Intraoperatively, a large palpable mass was encountered in the cecum. Palpation of the liver did not demonstrate any obvious abnormalities. There was no study of the abdomen. The omentum was wrapped around the cecal mass. The tumor was described to be large enough to feel through the wall of the mucosa though there did not seem to be any transmural invasion of the tumor at the rectosigmoid junction. The mass itself was attached to the lateral and anterior abdominal wall, thus a portion of the abdominal wall was resected with the tumor. The sigmoid colon was redundant, thus a generous portion of the sigmoid colon was resected. There was a large tumor at least 2 cm within the sigmoid. The lower anterior resection and right colectomy were completed successfully. 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 The lesion infiltrated through the full thickness of the colonic wall to involve the pericolonic adipose tissue. Surgical margins of excision were negative for malignancy. One lymph node was negative for evidence of malignancy.  2. INFeD 1000 mg, 01/27/2011 and 02/03/2011 (2000 mg).  3. FOLFOX, 02/14/2011 through 07/25/2011. 12 cycles.        Problem List Items Addressed This Visit        Other    Adenocarcinoma of colon (CMS/HCC) - Primary         Oncology/Hematology History   Adenocarcinoma of colon (CMS/HCC)   2/23/2020 Initial Diagnosis    Adenocarcinoma of colon (CMS/HCC)     2/23/2020 Cancer Staged    Staging form: Colon And Rectum, AJCC 8th Edition  - Clinical stage from 2/23/2020: Stage IIIB (cT3, cN1, cM0) - Signed by Mariano Yu MD on 2/24/2020         PAST MEDICAL HISTORY:  ALLERGIES:  Allergies   Allergen Reactions   • Demerol [Meperidine]      CURRENT MEDICATIONS:  Outpatient Encounter Medications as of 2/24/2021   Medication Sig Dispense Refill   • aspirin 81 MG EC tablet Take 81 mg by mouth Daily.     • B Complex-C (SUPER B COMPLEX PO) Take  by mouth.     • finasteride (PROSCAR) 5 MG tablet Take 1 tablet by mouth Daily. 90 tablet 3   • levocetirizine (XYZAL) 5 MG tablet Take 5 mg by mouth Every Evening.     • Omega-3 Fatty Acids (FISH OIL) 1000 MG capsule capsule Take  by mouth Daily With Breakfast.     • propranolol (INDERAL) 40 MG tablet Take 40 mg by mouth 2 (Two) Times a Day.     • tadalafil (Cialis) 20 MG tablet Take 1 tablet by mouth As Needed for Erectile Dysfunction. 10 tablet 11   • tamsulosin (FLOMAX) 0.4 MG capsule 24 hr capsule Take 1 capsule by mouth Daily. nightly 90 capsule 4   • vitamin D (ERGOCALCIFEROL) 1.25 MG (50687 UT) capsule capsule Take 50,000 Units by mouth 1 (One) Time Per Week.     • Coenzyme Q10 200 MG capsule Take 200 mg by mouth Daily.     • Loratadine 10 MG capsule Take  by mouth.       No facility-administered encounter medications on file as of 2/24/2021.      ADULT ILLNESSES:   Adenocarcinoma of cecum (disorder) ( ICD-10:C18.0 ;Malignant neoplasm of cecum   Benign prostatic hypertrophy ( Prostate biopsy 04/03/2012 (path benign). Dr. Mendoza following; ICD-10:N40.0 ;Enlarged prostate without lower urinary tract symptoms )   Hyperlipidemia ( ICD-10:E78.5 ;Hyperlipidemia, unspecified   Leukopenia ( ICD-10:D72.819 ;Decreased white blood cell count, unspecified   Microcytic anemia ( ICD-10:D50.9 ;Iron  deficiency anemia, unspecified   Neuropathy ( ICD-10:G62.9 ;Polyneuropathy, unspecified   Peritonitis ( 1956; ICD-10:K65.9 ;Peritonitis, unspecified )   Seasonal nasal allergies ( ICD-10:J30.1 ;Allergic rhinitis due to pollen   Weight gain ( ICD-10:R63.5 ;Abnormal weight gain    SURGERIES:   Colectomy, 12/2010   Port placement, 02/07/2011. Dr. Mancini   [facility] revealed benign prostatic glands and stroma, 08/07/2012   Colonoscopy, 11/08/2010   Left inguinal hernia repair, 2004. Dr. Mayorga   Report of surveillance colonoscopy last 01/18/2016 from Dr. Mancini. Normal.   Port removal, 08/01/2016. Dr. Mancini.      ADULT ILLNESSES:  Patient Active Problem List   Diagnosis Code   • Urgency of urination R39.15   • Elevated PSA R97.20   • BPH (benign prostatic hypertrophy) with urinary obstruction N40.1, N13.8   • Hydrocele N43.3   • Adenocarcinoma of colon (CMS/HCC) C18.9   • Colon cancer (CMS/HCC) C18.9   • Other and unspecified hyperlipidemia E78.5     SURGERIES:  Past Surgical History:   Procedure Laterality Date   • APPENDECTOMY     • COLONOSCOPY     • FOOT SURGERY     • HERNIA REPAIR     • PROSTATE BIOPSY       HEALTH MAINTENANCE ITEMS:  Health Maintenance Due   Topic Date Due   • COLONOSCOPY  1945   • TDAP/TD VACCINES (1 - Tdap) 04/10/1963   • ZOSTER VACCINE (1 of 2) 04/10/1994   • Pneumococcal Vaccine 65+ (1 of 1 - PPSV23) 04/10/2009   • HEPATITIS C SCREENING  08/07/2017   • INFLUENZA VACCINE  08/01/2020   • ANNUAL WELLNESS VISIT  01/14/2021       <no information>  Last Completed Colonoscopy       Status Date      COLONOSCOPY No completions recorded          There is no immunization history on file for this patient.  Last Completed Mammogram     Patient has no health maintenance due at this time            FAMILY HISTORY:  Family History   Problem Relation Age of Onset   • No Known Problems Father    • No Known Problems Mother      SOCIAL HISTORY:  Social History  "    Socioeconomic History   • Marital status:      Spouse name: Not on file   • Number of children: Not on file   • Years of education: Not on file   • Highest education level: Not on file   Tobacco Use   • Smoking status: Never Smoker   • Smokeless tobacco: Never Used   Substance and Sexual Activity   • Alcohol use: Yes       REVIEW OF SYSTEMS:  Constitutional:   The patient's appetite and energy are fairly good. \"Normal for me.\" He has been active outdoors including playing golf inspite of the cold weather. He has lost 6 pounds (in addition to 9 pounds at his prior visit) since the last visit. \"Stress with my wife being ill and new onset dementia.\" He has no fevers, chills, or drenching night sweats. His sleep habits seem appropriate.  Ear/Nose/Throat/Mouth:   He has no ear pains but has perennial sinus allergy symptoms. He has no sore throat, nosebleeds, or sore tongue. He has no headaches. He denies any hoarseness, change in voice quality, or hemoptysis.  Ocular:   He denies eye pain, significant change in visual acuity, double vision, or blurry vision.  Respiratory:   He has no chronic cough, significant shortness of breathing, phlegm production, or unexplained chest wall pain.  Has received COVID vaccination # 1, last 02/17/2021 - due for # 2 in 3 weeks  Cardiovascular:   He has no exertional chest pain, chest pressure, or chest heaviness. He reports no claudication. He reports no palpitations or symptomatic orthostasis.  Gastrointestinal:   He reports no dysphagia, nausea, vomiting, postprandial abdominal pain, bloating, cramping. He has not noted any bouts of rectal bleeding after bowel movements and on toilet paper. He was seen by Dr. Mancini last 05/24/2011 for anal fissures. He reports no bouts constipation. His bowels are formed and regular, moving at least 1-2 times daily. Surveillance c-scope by Dr. Mancini last 02/2019.  \"All clear.\"  Genitourinary:   He has no urinary burning, frequency, " "dribbling. He reports no difficulty controlling his bladder. He needs to urinate up to 2-3 times through the night, better on finasteride. He is followed by Dr. Hudson for urology. Had a prostate biopsy last 04/03/2012. Path was benign.  Musculoskeletal:   He reports no unexplained arthralgias, myalgias, but has nighttime leg cramping.  Extremities:   He reports no trouble with fluid retention or significant leg swelling.  Endocrine:   He reports no problems with excess thirst, excessive urination, vasomotor instability, or unexplained fatigue.  Heme/Lymphatic:   He reports no unexplained bleeding, bruising, petechial rashes, or swollen glands.  Skin:   He reports no new \"skin spots.\" Has been seen by dermatology (Dr. Alexandra in [place]) sometime 12/2019. Denies itching, rashes, or lesions which won't heal.  Neuro:   He reports no loss of consciousness, seizures, fainting spells, or dizziness. He reports no weakness of his face, arms, or legs. He has no difficulty with speech. He has no tremors. He still has minimal residual cold associated paresthesias of the hands and feet, very minimal on B complex multivitamin. \"Some.\"  Psych:   He seems generally satisfied with life. He denies depression but admits to situational anxiety. He reports no mood swings.      VITAL SIGNS: /70   Pulse 72   Temp 97.4 °F (36.3 °C)   Resp 16   Ht 177.8 cm (70\")   Wt 92.3 kg (203 lb 8 oz)   SpO2 100%   BMI 29.20 kg/m² Body surface area is 2.1 meters squared.  Pain Score    02/24/21 1403   PainSc: 0-No pain         PHYSICAL EXAMINATION:   General:   He is a pleasant, obese, well-developed, well-nourished and well-kept elderly male who is comfortable at rest. He arrived in the exam room ambulatory. He appears to be his stated age. His skin color is normal. ECOG PS = 0 (same).  Head/Neck:   The patient is anicteric and atraumatic. The mouth, and throat are clear. The trachea is midline. The neck is supple without evidence of " jugular venous distention or cervical adenopathy.  Eyes:   The pupils are equal, round, and reactive to light. The extraocular movements are full. There is no scleral jaundice or erythema.  Chest:   The respiratory efforts are normal and unhindered. The chest is clear to auscultation and percussion. There are no wheezes, rhonchi, rales, or asymmetry of breath sounds. The port in the left upper chest is well seated.  Cardiovascular:   The patient has a regular cardiac rate and rhythm without murmurs, rubs, or gallops. The peripheral pulses are equal and full.  Abdomen:   The belly is soft and globose. The firm, nontender subcutaneous nodule just right of the epigastrium that feels cystic than solid, again measuring approximately 4 x 3 cm (grossly unchanged; previously up to 5 x 6 cm). A similar lesion of similar consistency to the left of the midline is noted, measuring approximately 4 x 3 cm (unchanged). The midline longitudinal scar is noted. There remains subtle fullness underlying an older paraumbilical scar on the right (unchanged from prior exams). There is no rebound or guarding. There is no organomegaly, mass-effect, or tenderness. Bowel sounds are active and of normal character.  Extremities:   There is no evidence of cyanosis, clubbing, or edema.  Rheumatologic:   There is no overt evidence of rheumatoid deformities of the hands. There is no sausaging of the fingers. There is no sign of active synovitis. The gait is normal.  Cutaneous:   There are no overt rashes, disseminated lesions, purpura, or petechiae.  Lymphatics:   There is no evidence of adenopathy in the cervical, supraclavicular, axillary, inguinal, or femoral areas.  Neurologic:   The patient is alert, oriented, cooperative, and pleasant. He is appropriately conversant. He ambulated into the exam room without assistance and transferred from chair to exam table unaided. There is no overt dysfunction of the motor, sensory, or cerebellar  "systems.  Psych:   Mood and affect are appropriate for circumstance. Eye contact is appropriate.      ASSESSMENT:   1. 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   Stage: IIIB (pT3, pN1, Mx).   Tumor [place]: Cecal mass with infiltration through the full thickness of the colonic wall to involve the pericolonic adipose tissue (T3), and 1/15 regional lymph nodes involved with metastatic carcinoma.   Complication of Tumor: None   Tumor Status: Grossly resected, 01/07/2011. Adjuvant systemic therapy completed.   SEBASTIAN on colonoscopy, 02/2019?  2. Microcytic anemia from iron deficiency related to the colon malignancy + chemo. Resolved, with HGB 15.2, 02/19/2021 (prior range: HGB 12.8 - 18).   3. Mildly elevated AST/ALT (statins vs fatty liver?). Normal since stopping statins.  4. Benign prostatic hypertrophy (BPH) with PSA = 4.2, 01/02/2015 (prior range: 3.7 - 4.33). Prostate biopsy, 04/03/2012 (path benign). Will defer monitoring of PSA to urology.   5. Neuropathy, chemo residual. Subjectively minimal on B-complex multivitamin.   6. Weight gain from calories greater than expenditure, gaining 4 pounds since his last visit.   7. Firm, nontender subcutaneous nodule just right of the epigastrium that feels more solid than cystic, measuring approximately 4 x 3 cm (previously 5 x 6 cm) and another just left of the midline 4 x 4 cm (same). Ultrasound (above) confirms multiple solid subcutaneous nodules, lipomas, or other benign nodules. Has been seen by Dr. Mancini.   8. Keratotic skin lesions (face and back). Followed by dermatology in [place]. Previously discussed 08/29/2014 letter from Dr. Shahid Vasquez Re: Patient did not keep 08/21/2014 appointment. Said that he saw a \"skin doctor\" in [place], now thinks it is Dr. Conklin, was told everything alright and to come back in 1 year.     PLAN:   1. Apprised of labs from 02/19/2021 with normal CBC repleted iron levels, normal AST/ALT otherwise normal CMP, and normal CEA. "   2. Obtain report of c-scope last 02/2019- Dr. Mancini- second request  3. The role of adjuvant chemotherapy was previously discussed. I had explained that benefit has been unequivocally demonstrated (reduction in recurrence and mortality) for patients with resected Stage III colorectal cancer. The benefits of adjuvant chemotherapy (adjusted for age, tumor grade, lymph node involvement, and type of chemo regimen) were specifically outlined:  a. Reduction in mortality: 48%. 5 year absolute benefit: 6.2/14.4 alive.  b. Reduction in recurrence: 48%. 5 year absolute benefit: 7.8/18 without relapse.  4. Observe  5. Continue other currently identified medications.  6. Continue ongoing management per primary care, and other specialists.   7. Return to the [place] office with pre-office (specify fasting) iron, fe sat, ferritin, CMP, CEA and CBC with differential in 12 months.    MEDICAL DECISION MAKING: Low Complexity   AMOUNT OF DATA: Limited  RISK OF COMPLICATIONS: Low     I spent 20 total minutes, face-to-face, caring for Migue today.  Greater than 50% of this time involved counseling and/or coordination of care as documented within this note regarding the patient's illness(es), pros and cons of various treatment options, instructions and/or risk reduction.    cc: MD Bladimir Rooney MD

## 2021-02-24 ENCOUNTER — OFFICE VISIT (OUTPATIENT)
Dept: ONCOLOGY | Facility: CLINIC | Age: 76
End: 2021-02-24

## 2021-02-24 VITALS
WEIGHT: 203.5 LBS | HEIGHT: 70 IN | BODY MASS INDEX: 29.13 KG/M2 | HEART RATE: 72 BPM | TEMPERATURE: 97.4 F | RESPIRATION RATE: 16 BRPM | DIASTOLIC BLOOD PRESSURE: 70 MMHG | SYSTOLIC BLOOD PRESSURE: 144 MMHG | OXYGEN SATURATION: 100 %

## 2021-02-24 DIAGNOSIS — C18.9 ADENOCARCINOMA OF COLON (HCC): Primary | ICD-10-CM

## 2021-02-24 PROCEDURE — 99213 OFFICE O/P EST LOW 20 MIN: CPT | Performed by: INTERNAL MEDICINE

## 2021-02-24 RX ORDER — ERGOCALCIFEROL 1.25 MG/1
50000 CAPSULE ORAL WEEKLY
COMMUNITY
Start: 2021-02-03

## 2021-02-24 RX ORDER — PROPRANOLOL HYDROCHLORIDE 40 MG/1
40 TABLET ORAL 2 TIMES DAILY
COMMUNITY
Start: 2021-02-13

## 2021-02-24 RX ORDER — LEVOCETIRIZINE DIHYDROCHLORIDE 5 MG/1
5 TABLET, FILM COATED ORAL EVERY EVENING
COMMUNITY
End: 2023-03-24 | Stop reason: ALTCHOICE

## 2021-06-18 NOTE — PROGRESS NOTES
Subjective    Mr. Rodriguez is 76 y.o. male    Chief Complaint: BPH    History of Present Illness    75yo male established patient annual followup for history of enlarged prostate and ED. LUTS are well controlled on finasteride and tamsulosin.  Tadalafil working okay for his ED.   UA today is clear.    The following portions of the patient's history were reviewed and updated as appropriate: allergies, current medications, past family history, past medical history, past social history, past surgical history and problem list.    Review of Systems      Current Outpatient Medications:   •  aspirin 81 MG EC tablet, Take 81 mg by mouth Daily., Disp: , Rfl:   •  B Complex-C (SUPER B COMPLEX PO), Take  by mouth., Disp: , Rfl:   •  Coenzyme Q10 200 MG capsule, Take 200 mg by mouth Daily., Disp: , Rfl:   •  finasteride (PROSCAR) 5 MG tablet, Take 1 tablet by mouth Daily., Disp: 90 tablet, Rfl: 4  •  levocetirizine (XYZAL) 5 MG tablet, Take 5 mg by mouth Every Evening., Disp: , Rfl:   •  Loratadine 10 MG capsule, Take  by mouth., Disp: , Rfl:   •  Omega-3 Fatty Acids (FISH OIL) 1000 MG capsule capsule, Take  by mouth Daily With Breakfast., Disp: , Rfl:   •  propranolol (INDERAL) 40 MG tablet, Take 40 mg by mouth 2 (Two) Times a Day., Disp: , Rfl:   •  tadalafil (Cialis) 20 MG tablet, Take 1 tablet by mouth As Needed for Erectile Dysfunction., Disp: 10 tablet, Rfl: 11  •  tamsulosin (FLOMAX) 0.4 MG capsule 24 hr capsule, Take 1 capsule by mouth Daily. nightly, Disp: 90 capsule, Rfl: 4  •  vitamin D (ERGOCALCIFEROL) 1.25 MG (75390 UT) capsule capsule, Take 50,000 Units by mouth 1 (One) Time Per Week., Disp: , Rfl:     Past Medical History:   Diagnosis Date   • BPH with urinary obstruction    • Colon cancer (CMS/HCC)    • Elevated PSA    • Frequency of urination    • Hydrocele    • Hyperlipemia    • Peritonitis (CMS/HCC)    • Testalgia        Past Surgical History:   Procedure Laterality Date   • APPENDECTOMY     • COLONOSCOPY    "  • FOOT SURGERY     • HERNIA REPAIR     • PROSTATE BIOPSY         Social History     Socioeconomic History   • Marital status:      Spouse name: Not on file   • Number of children: Not on file   • Years of education: Not on file   • Highest education level: Not on file   Tobacco Use   • Smoking status: Never Smoker   • Smokeless tobacco: Never Used   Substance and Sexual Activity   • Alcohol use: Yes       Family History   Problem Relation Age of Onset   • No Known Problems Father    • No Known Problems Mother        Objective    Temp 97.9 °F (36.6 °C)   Ht 177.8 cm (70\")   Wt 90.7 kg (200 lb)   BMI 28.70 kg/m²     Physical Exam        Results for orders placed or performed in visit on 06/24/21   POC Urinalysis Dipstick, Multipro    Specimen: Urine   Result Value Ref Range    Color Yellow Yellow, Straw, Dark Yellow, Cori    Clarity, UA Clear Clear    Glucose, UA Negative Negative, 1000 mg/dL (3+) mg/dL    Bilirubin Negative Negative    Ketones, UA Negative Negative    Specific Gravity  1.030 1.005 - 1.030    Blood, UA Negative Negative    pH, Urine 6.5 5.0 - 8.0    Protein, POC Negative Negative mg/dL    Urobilinogen, UA Normal Normal    Nitrite, UA Negative Negative    Leukocytes Negative Negative     Assessment and Plan    Diagnoses and all orders for this visit:    1. Benign prostatic hyperplasia with lower urinary tract symptoms, symptom details unspecified (Primary)  -     POC Urinalysis Dipstick, Multipro  -     finasteride (PROSCAR) 5 MG tablet; Take 1 tablet by mouth Daily.  Dispense: 90 tablet; Refill: 4    2. Urgency of urination  -     tamsulosin (FLOMAX) 0.4 MG capsule 24 hr capsule; Take 1 capsule by mouth Daily. nightly  Dispense: 90 capsule; Refill: 4    3. Erectile dysfunction due to diseases classified elsewhere  -     tadalafil (Cialis) 20 MG tablet; Take 1 tablet by mouth As Needed for Erectile Dysfunction.  Dispense: 10 tablet; Refill: 11      LUTS well controlled on tamsulosin and " finasteride-continue.  Continue tadalafil as needed for his ED.  He will follow-up in 1 year in my [facility] or sooner as needed.      This document has been signed by ELEANOR Hudson MD on June 24, 2021 18:30 CDT

## 2021-06-24 ENCOUNTER — OFFICE VISIT (OUTPATIENT)
Dept: UROLOGY | Facility: CLINIC | Age: 76
End: 2021-06-24

## 2021-06-24 VITALS — BODY MASS INDEX: 28.63 KG/M2 | TEMPERATURE: 97.9 F | WEIGHT: 200 LBS | HEIGHT: 70 IN

## 2021-06-24 DIAGNOSIS — R39.15 URGENCY OF URINATION: ICD-10-CM

## 2021-06-24 DIAGNOSIS — N52.1 ERECTILE DYSFUNCTION DUE TO DISEASES CLASSIFIED ELSEWHERE: ICD-10-CM

## 2021-06-24 DIAGNOSIS — N40.1 BENIGN PROSTATIC HYPERPLASIA WITH LOWER URINARY TRACT SYMPTOMS, SYMPTOM DETAILS UNSPECIFIED: Primary | ICD-10-CM

## 2021-06-24 LAB
BILIRUB BLD-MCNC: NEGATIVE MG/DL
CLARITY, POC: CLEAR
COLOR UR: YELLOW
GLUCOSE UR STRIP-MCNC: NEGATIVE MG/DL
KETONES UR QL: NEGATIVE
LEUKOCYTE EST, POC: NEGATIVE
NITRITE UR-MCNC: NEGATIVE MG/ML
PH UR: 6.5 [PH] (ref 5–8)
PROT UR STRIP-MCNC: NEGATIVE MG/DL
RBC # UR STRIP: NEGATIVE /UL
SP GR UR: 1.03 (ref 1–1.03)
UROBILINOGEN UR QL: NORMAL

## 2021-06-24 PROCEDURE — 81003 URINALYSIS AUTO W/O SCOPE: CPT | Performed by: UROLOGY

## 2021-06-24 PROCEDURE — 99214 OFFICE O/P EST MOD 30 MIN: CPT | Performed by: UROLOGY

## 2021-06-24 RX ORDER — TADALAFIL 20 MG/1
20 TABLET ORAL AS NEEDED
Qty: 10 TABLET | Refills: 11 | Status: SHIPPED | OUTPATIENT
Start: 2021-06-24 | End: 2022-06-30 | Stop reason: SDUPTHER

## 2021-06-24 RX ORDER — TAMSULOSIN HYDROCHLORIDE 0.4 MG/1
1 CAPSULE ORAL DAILY
Qty: 90 CAPSULE | Refills: 4 | Status: SHIPPED | OUTPATIENT
Start: 2021-06-24 | End: 2022-06-30 | Stop reason: SDUPTHER

## 2021-06-24 RX ORDER — FINASTERIDE 5 MG/1
5 TABLET, FILM COATED ORAL DAILY
Qty: 90 TABLET | Refills: 4 | Status: SHIPPED | OUTPATIENT
Start: 2021-06-24 | End: 2022-06-30 | Stop reason: SDUPTHER

## 2021-06-24 NOTE — PATIENT INSTRUCTIONS
"BMI for Adults  What is BMI?  Body mass index (BMI) is a number that is calculated from a person's weight and height. BMI can help estimate how much of a person's weight is composed of fat. BMI does not measure body fat directly. Rather, it is an alternative to procedures that directly measure body fat, which can be difficult and expensive.  BMI can help identify people who may be at higher risk for certain medical problems.  What are BMI measurements used for?  BMI is used as a screening tool to identify possible weight problems. It helps determine whether a person is obese, overweight, a healthy weight, or underweight.  BMI is useful for:  · Identifying a weight problem that may be related to a medical condition or may increase the risk for medical problems.  · Promoting changes, such as changes in diet and exercise, to help reach a healthy weight. BMI screening can be repeated to see if these changes are working.  How is BMI calculated?  BMI involves measuring your weight in relation to your height. Both height and weight are measured, and the BMI is calculated from those numbers. This can be done either in English ([place] or metric measurements. Note that charts and online BMI calculators are available to help you find your BMI quickly and easily without having to do these calculations yourself.  To calculate your BMI in English ([place] measurements:    1. Measure your weight in pounds (lb).  2. Multiply the number of pounds by 703.  ? For example, for a person who weighs 180 lb, multiply that number by 703, which equals 126,540.  3. Measure your height in inches. Then multiply that number by itself to get a measurement called \"inches squared.\"  ? For example, for a person who is 70 inches tall, the \"inches squared\" measurement is 70 inches x 70 inches, which equals 4,900 inches squared.  4. Divide the total from step 2 (number of lb x 703) by the total from step 3 (inches squared): 126,540 ÷ 4,900 = 25.8. This is " "your BMI.  To calculate your BMI in metric measurements:  1. Measure your weight in kilograms (kg).  2. Measure your height in meters (m). Then multiply that number by itself to get a measurement called \"meters squared.\"  ? For example, for a person who is 1.75 m tall, the \"meters squared\" measurement is 1.75 m x 1.75 m, which is equal to 3.1 meters squared.  3. Divide the number of kilograms (your weight) by the meters squared number. In this example: 70 ÷ 3.1 = 22.6. This is your BMI.  What do the results mean?  BMI charts are used to identify whether you are underweight, normal weight, overweight, or obese. The following guidelines will be used:  · Underweight: BMI less than 18.5.  · Normal weight: BMI between 18.5 and 24.9.  · Overweight: BMI between 25 and 29.9.  · Obese: BMI of 30 or above.  Keep these notes in mind:  · Weight includes both fat and muscle, so someone with a muscular build, such as an athlete, may have a BMI that is higher than 24.9. In cases like these, BMI is not an accurate measure of body fat.  · To determine if excess body fat is the cause of a BMI of 25 or higher, further assessments may need to be done by a health care provider.  · BMI is usually interpreted in the same way for men and women.  Where to find more information  For more information about BMI, including tools to quickly calculate your BMI, go to these websites:  · Centers for Disease Control and Prevention: www.cdc.gov  · American Heart Association: www.heart.org  · National Heart, Lung, and [facility] [place]: www.nhlbi.nih.gov  Summary  · Body mass index (BMI) is a number that is calculated from a person's weight and height.  · BMI may help estimate how much of a person's weight is composed of fat. BMI can help identify those who may be at higher risk for certain medical problems.  · BMI can be measured using English measurements or metric measurements.  · BMI charts are used to identify whether you are underweight, normal " weight, overweight, or obese.  This information is not intended to replace advice given to you by your health care provider. Make sure you discuss any questions you have with your health care provider.  Document Revised: 09/09/2020 Document Reviewed: 07/17/2020  [employer] Patient Education © 2021 [employer]

## 2021-08-02 PROBLEM — C18.9 ADENOCARCINOMA OF COLON (HCC): Status: ACTIVE | Noted: 2020-02-23

## 2021-08-02 RX ORDER — TADALAFIL 20 MG/1
20 TABLET ORAL PRN
COMMUNITY
Start: 2020-07-20

## 2021-08-02 RX ORDER — LORATADINE 10 MG/1
CAPSULE, LIQUID FILLED ORAL
COMMUNITY
End: 2022-01-20 | Stop reason: ALTCHOICE

## 2021-09-13 ENCOUNTER — OFFICE VISIT (OUTPATIENT)
Dept: FAMILY MEDICINE CLINIC | Age: 76
End: 2021-09-13
Payer: MEDICARE

## 2021-09-13 VITALS
HEART RATE: 64 BPM | HEIGHT: 70 IN | RESPIRATION RATE: 20 BRPM | OXYGEN SATURATION: 98 % | DIASTOLIC BLOOD PRESSURE: 74 MMHG | TEMPERATURE: 98 F | WEIGHT: 201.6 LBS | SYSTOLIC BLOOD PRESSURE: 138 MMHG | BODY MASS INDEX: 28.86 KG/M2

## 2021-09-13 DIAGNOSIS — Z00.00 ROUTINE GENERAL MEDICAL EXAMINATION AT A HEALTH CARE FACILITY: Primary | ICD-10-CM

## 2021-09-13 PROCEDURE — 1123F ACP DISCUSS/DSCN MKR DOCD: CPT | Performed by: FAMILY MEDICINE

## 2021-09-13 PROCEDURE — 4040F PNEUMOC VAC/ADMIN/RCVD: CPT | Performed by: FAMILY MEDICINE

## 2021-09-13 PROCEDURE — G0439 PPPS, SUBSEQ VISIT: HCPCS | Performed by: FAMILY MEDICINE

## 2021-09-13 RX ORDER — LEVOCETIRIZINE DIHYDROCHLORIDE 5 MG/1
5 TABLET, FILM COATED ORAL EVERY EVENING
COMMUNITY
End: 2022-08-16

## 2021-09-13 SDOH — ECONOMIC STABILITY: FOOD INSECURITY: WITHIN THE PAST 12 MONTHS, YOU WORRIED THAT YOUR FOOD WOULD RUN OUT BEFORE YOU GOT MONEY TO BUY MORE.: NEVER TRUE

## 2021-09-13 SDOH — ECONOMIC STABILITY: FOOD INSECURITY: WITHIN THE PAST 12 MONTHS, THE FOOD YOU BOUGHT JUST DIDN'T LAST AND YOU DIDN'T HAVE MONEY TO GET MORE.: NEVER TRUE

## 2021-09-13 ASSESSMENT — LIFESTYLE VARIABLES
HAS A RELATIVE, FRIEND, DOCTOR, OR ANOTHER HEALTH PROFESSIONAL EXPRESSED CONCERN ABOUT YOUR DRINKING OR SUGGESTED YOU CUT DOWN: 0
HOW MANY STANDARD DRINKS CONTAINING ALCOHOL DO YOU HAVE ON A TYPICAL DAY: 0
AUDIT-C TOTAL SCORE: 1
HOW OFTEN DURING THE LAST YEAR HAVE YOU FOUND THAT YOU WERE NOT ABLE TO STOP DRINKING ONCE YOU HAD STARTED: 0
HOW OFTEN DURING THE LAST YEAR HAVE YOU HAD A FEELING OF GUILT OR REMORSE AFTER DRINKING: 0
HOW OFTEN DO YOU HAVE A DRINK CONTAINING ALCOHOL: 1
HOW OFTEN DURING THE LAST YEAR HAVE YOU FAILED TO DO WHAT WAS NORMALLY EXPECTED FROM YOU BECAUSE OF DRINKING: 0
HOW OFTEN DURING THE LAST YEAR HAVE YOU NEEDED AN ALCOHOLIC DRINK FIRST THING IN THE MORNING TO GET YOURSELF GOING AFTER A NIGHT OF HEAVY DRINKING: 0
HOW OFTEN DO YOU HAVE SIX OR MORE DRINKS ON ONE OCCASION: 0
HAVE YOU OR SOMEONE ELSE BEEN INJURED AS A RESULT OF YOUR DRINKING: 0

## 2021-09-13 ASSESSMENT — PATIENT HEALTH QUESTIONNAIRE - PHQ9
SUM OF ALL RESPONSES TO PHQ QUESTIONS 1-9: 0
1. LITTLE INTEREST OR PLEASURE IN DOING THINGS: 0
SUM OF ALL RESPONSES TO PHQ QUESTIONS 1-9: 0
SUM OF ALL RESPONSES TO PHQ9 QUESTIONS 1 & 2: 0
2. FEELING DOWN, DEPRESSED OR HOPELESS: 0
SUM OF ALL RESPONSES TO PHQ QUESTIONS 1-9: 0

## 2021-09-13 ASSESSMENT — SOCIAL DETERMINANTS OF HEALTH (SDOH): HOW HARD IS IT FOR YOU TO PAY FOR THE VERY BASICS LIKE FOOD, HOUSING, MEDICAL CARE, AND HEATING?: NOT HARD AT ALL

## 2021-09-13 NOTE — PATIENT INSTRUCTIONS
Personalized Preventive Plan for Cece Wilcox - 9/13/2021  Medicare offers a range of preventive health benefits. Some of the tests and screenings are paid in full while other may be subject to a deductible, co-insurance, and/or copay. Some of these benefits include a comprehensive review of your medical history including lifestyle, illnesses that may run in your family, and various assessments and screenings as appropriate. After reviewing your medical record and screening and assessments performed today your provider may have ordered immunizations, labs, imaging, and/or referrals for you. A list of these orders (if applicable) as well as your Preventive Care list are included within your After Visit Summary for your review. Other Preventive Recommendations:    · A preventive eye exam performed by an eye specialist is recommended every 1-2 years to screen for glaucoma; cataracts, macular degeneration, and other eye disorders. · A preventive dental visit is recommended every 6 months. · Try to get at least 150 minutes of exercise per week or 10,000 steps per day on a pedometer . · Order or download the FREE \"Exercise & Physical Activity: Your Everyday Guide\" from [employer]. Call 2-227.771.1815 or search [employer] online. · You need 2841-3988 mg of calcium and 4734-5489 IU of vitamin D per day. It is possible to meet your calcium requirement with diet alone, but a vitamin D supplement is usually necessary to meet this goal.  · When exposed to the sun, use a sunscreen that protects against both UVA and UVB radiation with an SPF of 30 or greater. Reapply every 2 to 3 hours or after sweating, drying off with a towel, or swimming. · Always wear a seat belt when traveling in a car. Always wear a helmet when riding a bicycle or motorcycle. Heart-Healthy Diet   Sodium, Fat, and Cholesterol Controlled Diet       What Is a Heart Healthy Diet?    A good cholesterol, this type of cholesterol actually carries cholesterol away from your arteries and may, therefore, help lower your risk of having a heart attack. You want this level to be high (ideally greater than 60). It is a risk to have a level less than 40. You can raise this good cholesterol by eating olive oil, canola oil, avocados, or nuts. Exercise raises this level, too. Fat    Fat is calorie dense and packs a lot of calories into a small amount of food. Even though fats should be limited due to their high calorie content, not all fats are bad. In fact, some fats are quite healthful. Fat can be broken down into four main types. The good-for-you fats are:   Monounsaturated fat  found in oils such as olive and canola, avocados, and nuts and natural nut butters; can decrease cholesterol levels, while keeping levels of HDL cholesterol high   Polyunsaturated fat  found in oils such as safflower, sunflower, soybean, corn, and sesame; can decrease total cholesterol and LDL cholesterol   Omega-3 fatty acids  particularly those found in fatty fish (such as salmon, trout, tuna, mackerel, herring, and sardines); can decrease risk of arrhythmias, decrease triglyceride levels, and slightly lower blood pressure   The fats that you want to limit are:   Saturated fat  found in animal products, many fast foods, and a few vegetables; increases total blood cholesterol, including LDL levels   Animal fats that are saturated include: butter, lard, whole-milk dairy products, meat fat, and poultry skin   Vegetable fats that are saturated include: hydrogenated shortening, palm oil, coconut oil, cocoa butter   Hydrogenated or trans fat  found in margarine and vegetable shortening, most shelf stable snack foods, and fried foods; increases LDL and decreases HDL     It is generally recommended that you limit your total fat for the day to less than 30% of your total calories.  If you follow an 1800-calorie heart healthy diet, for example, this would mean 60 grams of fat or less per day. Saturated fat and trans fat in your diet raises your blood cholesterol the most, much more than dietary cholesterol does. For this reason, on a heart-healthy diet, less than 7% of your calories should come from saturated fat and ideally 0% from trans fat. On an 1800-calorie diet, this translates into less than 14 grams of saturated fat per day, leaving 46 grams of fat to come from mono- and polyunsaturated fats.    Food Choices on a Heart Healthy Diet   Food Category   Foods Recommended   Foods to Avoid   Grains   Breads and rolls without salted tops Most dry and cooked cereals Unsalted crackers and breadsticks Low-sodium or homemade breadcrumbs or stuffing All rice and pastas   Breads, rolls, and crackers with salted tops High-fat baked goods (eg, muffins, donuts, pastries) Quick breads, self-rising flour, and biscuit mixes Regular bread crumbs Instant hot cereals Commercially prepared rice, pasta, or stuffing mixes   Vegetables   Most fresh, frozen, and low-sodium canned vegetables Low-sodium and salt-free vegetable juices Canned vegetables if unsalted or rinsed   Regular canned vegetables and juices, including sauerkraut and pickled vegetables Frozen vegetables with sauces Commercially prepared potato and vegetable mixes   Fruits   Most fresh, frozen, and canned fruits All fruit juices   Fruits processed with salt or sodium   Milk   Nonfat or low-fat (1%) milk Nonfat or low-fat yogurt Cottage cheese, low-fat ricotta, cheeses labeled as low-fat and low-sodium   Whole milk Reduced-fat (2%) milk Malted and chocolate milk Full fat yogurt Most cheeses (unless low-fat and low salt) Buttermilk (no more than 1 cup per week)   Meats and Beans   Lean cuts of fresh or frozen beef, veal, lamb, or pork (look for the word loin) Fresh or frozen poultry without the skin Fresh or frozen fish and some shellfish Egg whites and egg substitutes (Limit whole eggs to three per week) Tofu Nuts or seeds (unsalted, dry-roasted), low-sodium peanut butter Dried peas, beans, and lentils   Any smoked, cured, salted, or canned meat, fish, or poultry (including liao, chipped beef, cold cuts, hot dogs, sausages, sardines, and anchovies) Poultry skins Breaded and/or fried fish or meats Canned peas, beans, and lentils Salted nuts   Fats and Oils   Olive oil and canola oil Low-sodium, low-fat salad dressings and mayonnaise   Butter, margarine, coconut and palm oils, liao fat   Snacks, Sweets, and Condiments   Low-sodium or unsalted versions of broths, soups, soy sauce, and condiments Pepper, herbs, and spices; vinegar, lemon, or lime juice Low-fat frozen desserts (yogurt, sherbet, fruit bars) Sugar, cocoa powder, honey, syrup, jam, and preserves Low-fat, trans-fat free cookies, cakes, and pies Paddy and animal crackers, fig bars, kelly snaps   High-fat desserts Broth, soups, gravies, and sauces, made from instant mixes or other high-sodium ingredients Salted snack foods Canned olives Meat tenderizers, seasoning salt, and most flavored vinegars   Beverages   Low-sodium carbonated beverages Tea and coffee in moderation Soy milk   Commercially softened water   Suggestions   Make whole grains, fruits, and vegetables the base of your diet. Choose heart-healthy fats such as canola, olive, and flaxseed oil, and foods high in heart-healthy fats, such as nuts, seeds, soybeans, tofu, and fish. Eat fish at least twice per week; the fish highest in omega-3 fatty acids and lowest in mercury include salmon, herring, mackerel, sardines, and canned chunk light tuna. If you eat fish less than twice per week or have high triglycerides, talk to your doctor about taking fish oil supplements. Read food labels.    For products low in fat and cholesterol, look for fat free, low-fat, cholesterol free, saturated fat free, and trans fat freeAlso scan the Nutrition Facts Label, which lists saturated fat, trans fat, and cholesterol amounts. For products low in sodium, look for sodium free, very low sodium, low sodium, no added salt, and unsalted   Skip the salt when cooking or at the table; if food needs more flavor, get creative and try out different herbs and spices. Garlic and onion also add substantial flavor to foods. Trim any visible fat off meat and poultry before cooking, and drain the fat off after montoya. Use cooking methods that require little or no added fat, such as grilling, boiling, baking, poaching, broiling, roasting, steaming, stir-frying, and sauting. Avoid fast food and convenience food. They tend to be high in saturated and trans fat and have a lot of added salt. Talk to a registered dietitian for individualized diet advice. Last Reviewed: March 2011 Yoko Baez MS, MPH, RD   Updated: 3/29/2011   ·     Preventing Osteoporosis: After Your Visit  Your Care Instructions  Osteoporosis means the bones are weak and thin enough that they can break easily. The older you are, the more likely you are to get osteoporosis. But with plenty of calcium, vitamin D, and exercise, you can help prevent osteoporosis. The preteen and teen years are a key time for bone building. With the help of calcium, vitamin D, and exercise in those early years and beyond, the bones reach their peak density and strength by age 27. After age 27, your bones naturally start to thin and weaken. The stronger your bones are at around age 27, the lower your risk for osteoporosis. But no matter what your age and risk are, your bones still need calcium, vitamin D, and exercise to stay strong. Also avoid smoking, and limit alcohol. Smoking and heavy alcohol use can make your bones thinner. Talk to your doctor about any special risks you might have, such as having a close relative with osteoporosis or taking a medicine that can weaken bones. Your doctor can tell you the best ways to protect your bones from thinning.   Follow-up care is a key part of your treatment and safety. Be sure to make and go to all appointments, and call your doctor if you are having problems. It's also a good idea to know your test results and keep a list of the medicines you take. How can you care for yourself at home? Get enough calcium and vitamin D. The [place] [employer] recommends adults younger than age 46 need 1,000 mg of calcium and 600 IU of vitamin D each day. Women ages 46 to 79 need 1,200 mg of calcium and 600 IU of vitamin D each day. Men ages 46 to 79 need 1,000 mg of calcium and 600 IU of vitamin D each day. Adults 71 and older need 1,200 mg of calcium and 800 IU of vitamin D each day. Eat foods rich in calcium, like yogurt, cheese, milk, and dark green vegetables. Eat foods rich in vitamin D, like eggs, fatty fish, cereal, and fortified milk. Get some sunshine. Your body uses sunshine to make its own vitamin D. The safest time to be out in the sun is before 10 a.m. or after 3 p.m. Avoid getting sunburned. Sunburn can increase your risk of skin cancer. Talk to your doctor about taking a calcium plus vitamin D supplement. Ask about what type of calcium is right for you, and how much to take at a time. Adults ages 23 to 48 should not get more than 2,500 mg of calcium and 4,000 IU of vitamin D each day, whether it is from supplements and/or food. Adults ages 46 and older should not get more than 2,000 mg of calcium and 4,000 IU of vitamin D each day from supplements and/or food. Get regular bone-building exercise. Weight-bearing and resistance exercises keep bones healthy by working the muscles and bones against gravity. Start out at an exercise level that feels right for you. Add a little at a time until you can do the following:  Do 30 minutes of weight-bearing exercise on most days of the week. Walking, jogging, stair climbing, and dancing are good choices. Do resistance exercises with weights or elastic bands 2 to 3 days a week.   Limit alcohol. Drink no more than 1 alcohol drink a day if you are a woman. Drink no more than 2 alcohol drinks a day if you are a man. Do not smoke. Smoking can make bones thin faster. If you need help quitting, talk to your doctor about stop-smoking programs and medicines. These can increase your chances of quitting for good. When should you call for help? Watch closely for changes in your health, and be sure to contact your doctor if:  You need help with a healthy eating plan. You need help with an exercise plan    © 4750-8530 [employer]. Care instructions adapted under license by [facility]. This care instruction is for use with your licensed healthcare professional. If you have questions about a medical condition or this instruction, always ask your healthcare professional. [address] any warranty or liability for your use of this information. Content Version: 9.4.53648; Last Revised: June 20, 2011              ·     Keep Your Memory Silvia Rich       Many factors can affect your ability to remembera hectic lifestyle, aging, stress, chronic disease, and certain medicines. But, there are steps you can take to sharpen your mind and help preserve your memory. Challenge Your Brain   Regularly challenging your mind may help keeps it in top shape. Good mental exercises include:   Crossword puzzlesUse a dictionary if you need it; you will learn more that way. Brainteasers Try some! Crafts, such as wood working and sewing   Hobbies, such as gardening and building model airplanes   SocializingVisit old friends or join groups to meet new ones.    Reading   Learning a new language   Taking a class, whether it be art history or elbert chi   TravelingExperience the food, history, and culture of your destination   Learning to use a computer   Going to museums, the theater, or thought-provoking movies   Changing things in your daily life, such as reversing your pattern in the grocery store or brushing your teeth using your nondominant hand   Use Memory Aids   There is no need to remember every detail on your own. These memory aids can help:   Calendars and day planners   Electronic organizers to store all sorts of helpful informationThese devices can \"beep\" to remind you of appointments. A book of days to record birthdays, anniversaries, and other occasions that occur on the same date every year   Detailed \"to-do\" lists and strategically placed sticky notes   Quick \"study\" sessionsBefore a gathering, review who will be there so their names will be fresh in your mind. Establish routinesFor example, keep your keys, wallet, and umbrella in the same place all the time or take medicine with your 8:00 AM glass of juice   Live a Healthy Life   Many actions that will keep your body strong will do the same for your mind. For example:   Talk to Your Doctor About Herbs and Supplements    Malnutrition and vitamin deficiencies can impair your mental function. For example, vitamin B12 deficiency can cause a range of symptoms, including confusion. But, what if your nutritional needs are being met? Can herbs and supplements still offer a benefit? Researchers have investigated a range of natural remedies, such as ginkgo , ginseng , and the supplement phosphatidylserine (PS). So far, though, the evidence is inconsistent as to whether these products can improve memory or thinking. If you are interested in taking herbs and supplements, talk to your doctor first because they may interact with other medicines that you are taking. Exercise Regularly    Among the many benefits of regular exercise are increased blood flow to the brain and decreased risk of certain diseases that can interfere with memory function. One study found that even moderate exercise has a beneficial effect.  Examples of \"moderate\" exercise include:   Playing 18 holes of golf once a week, without a cart   Playing tennis twice a week Walking one mile per day   Manage Stress    It can be tough to remember what is important when your mind is cluttered. Make time for relaxation. Choose activities that calm you down, and make it routine. Manage Chronic Conditions    Side effects of high blood pressure , diabetes, and heart disease can interfere with mental function. Many of the lifestyle steps discussed here can help manage these conditions. Strive to eat a healthy diet, exercise regularly, get stress under control, and follow your doctor's advice for your condition. Minimize Medications    Talk to your doctor about the medicines that you take. Some may be unnecessary. Also, healthy lifestyle habits may lower the need for certain drugs. Last Reviewed: April 2010 Thomas Griffith MD   Updated: 4/13/2010   ·     [address] [place] [address]       As we get older, changes in balance, gait, strength, vision, hearing, and cognition make even the most youthful senior more prone to accidents. Falls are one of the leading health risks for older people. This increased risk of falling is related to:   Aging process (eg, decreased muscle strength, slowed reflexes)   Higher incidence of chronic health problems (eg, arthritis, diabetes) that may limit mobility, agility or sensory awareness   Side effects of medicine (eg, dizziness, blurred vision)especially medicines like prescription pain medicines and drugs used to treat mental health conditions   Depending on the brittleness of your bones, the consequences of a fall can be serious and long lasting. Home Life   Research by the Association of Aging [facility]) shows that some home accidents among older adults can be prevented by making simple lifestyle changes and basic modifications and repairs to the home environment. Here are some lifestyle changes that experts recommend:   Have your hearing and vision checked regularly. Be sure to wear prescription glasses that are right for you.    Speak to your doctor or pharmacist about the possible side effects of your medicines. A number of medicines can cause dizziness. If you have problems with sleep, talk to your doctor. Limit your intake of alcohol. If necessary, use a cane or walker to help maintain your balance. Wear supportive, rubber-soled shoes, even at home. If you live in a region that gets wintry weather, you may want to put special cleats on your shoes to prevent you from slipping on the snow and ice. Exercise regularly to help maintain muscle tone, agility, and balance. Always hold the banister when going up or down stairs. Also, use  bars when getting in or out of the bath or shower, or using the toilet. To avoid dizziness, get up slowly from a lying down position. Sit up first, dangling your legs for a minute or two before rising to a standing position. Overall Home Safety Check   According to the Consumer [employer] Safety Comm[employer]'s \"Older Consumer Home Safety Checklist,\" it is important to check for potential hazards in each room. And remember, proper lighting is an essential factor in home safety. If you cannot see clearly, you are more likely to fall. Important questions to ask yourself include:   Are lamp, electric, extension, and telephone cords placed out of the flow of traffic and maintained in good condition? Have frayed cords been replaced? Are all small rugs and runners slip resistant? If not, you can secure them to the floor with a special double-sided carpet tape. Are smoke detectors properly locatedone on every floor of your home and one outside of every sleeping area? Are they in good working order? Are batteries replaced at least once a year? Do you have a well-maintained carbon monoxide detector outside every sleeping are in your home? Does your furniture layout leave plenty of space to maneuver between and around chairs, tables, beds, and sofas? Are hallways, stairs and passages between rooms well lit?  Can you reach a lamp without getting out of bed? Are floor surfaces well maintained? Shag rugs, high-pile carpeting, tile floors, and polished wood floors can be particularly slippery. Stairs should always have handrails and be carpeted or fitted with a non-skid tread. Is your telephone easily reachable. Is the cord safely tucked away? Room by Room   According to the Association of Aging, bathrooms and farheen are the two most potentially hazardous rooms in your home. In the Kitchen    Be sure your stove is in proper working order and always make sure burners and the oven are off before you go out or go to sleep. Keep pots on the back burners, turn handles away from the front of the stove, and keep stove clean and free of grease build-up. Kitchen ventilation systems and range exhausts should be working properly. Keep flammable objects such as towels and pot holders away from the cooking area except when in use. Make sure kitchen curtains are tied back. Move cords and appliances away from the sink and hot surfaces. If extension cords are needed, install wiring guides so they do not hang over the sink, range, or working areas. Look for coffee pots, kettles and toaster ovens with automatic shut-offs. Keep a mop handy in the kitchen so you can wipe up spills instantly. You should also have a small fire extinguisher. Arrange your kitchen with frequently used items on lower shelves to avoid the need to stand on a stepstool to reach them. Make sure countertops are well-lit to avoid injuries while cutting and preparing food. In the Bathroom    Use a non-slip mat or decals in the tub and shower, since wet, soapy tile or porcelain surfaces are extremely slippery. Make sure bathroom rugs are non-skid or tape them firmly to the floor. Bathtubs should have at least one, preferably two, grab bars, firmly attached to structural supports in the wall.  (Do not use built-in soap holders or glass shower doors as grab bars.)    Tub seats fitted with non-slip material on the legs allow you to wash sitting down. For people with limited mobility, bathtub transfer benches allow you to slide safely into the tub. Raised toilet seats and toilet safety rails are helpful for those with knee or hip problems. In the [facility]    Make sure you use a nightlight and that the area around your bed is clear of potential obstacles. Be careful with electric blankets and never go to sleep with a heating pad, which can cause serious burns even if on a low setting. Use fire-resistant mattress covers and pillows, and NEVER smoke in bed. Keep a phone next to the bed that is programmed to dial 911 at the push of a button. If you have a chronic condition, you may want to sign on with an automatic call-in service. Typically the system includes a small pendant that connects directly to an emergency medical voice-response system. You should also make arrangements to stay in contact with someonefriend, neighbor, family memberon a regular schedule. Fire Prevention   According to the [employer] (Smoke Alarms for Every) [address] [place] [address], senior citizens are one of the two highest risk groups for death and serious injuries due to residential fires. When cooking, wear short-sleeved items, never a bulky long-sleeved robe. The [facility]'s Safety Checklist for Older Consumers emphasizes the importance of checking basements, garages, workshops and storage areas for fire hazards, such as volatile liquids, piles of old rags or clothing and overloaded circuits. Never smoke in bed or when lying down on a couch or recliner chair. Small portable electric or kerosene heaters are responsible for many home fires and should be used cautiously if at all. If you do use one, be sure to keep them away from flammable materials. In case of fire, make sure you have a pre-established emergency exit plan.     Have a professional check your fireplace and other fuel-burning appliances yearly. [employer]   Baby boomers entering the fleming years will continue to see the development of new products to help older adults live safely and independently in spite of age-related changes. Making Life More Livable  , by Marie Alcantar, lists over 1,000 products for \"living well in the mature years,\" such as bathing and mobility aids, household security devices, ergonomically designed knives and peelers, and faucet valves and knobs for temperature control. Medical supply stores and organizations are good sources of information about products that improve your quality of life and insure your safety.      Last Reviewed: November 2009 Dwight Robbins MD   Updated: 3/7/2011     ·

## 2021-09-13 NOTE — PROGRESS NOTES
Medicare Annual Wellness Visit  Name: Andressa Lakhani Date: 2021   MRN: [id number] Sex: Male   Age: 67 y.o. Ethnicity: Non- / Non    : 1945 Race: White (non-)      Louise Mejia is here for [employer] AWV    Mr. Vane Dorsey is here for his exam. He is taking care of his wife who has Alzheimer's disease. He does golf daily and fixes meals for his wife. He is stressed with her care. Screenings for behavioral, psychosocial and functional/safety risks, and cognitive dysfunction are all negative except as indicated below. These results, as well as other patient data from the [address] form, are documented in Flowsheets linked to this Encounter. Allergies   Allergen Reactions    Demerol Hcl [Meperidine] Other (See Comments)     Hypotension       Prior to Visit Medications    Medication Sig Taking? Authorizing Provider   levocetirizine (XYZAL) 5 MG tablet Take 5 mg by mouth every evening Yes Historical Provider, MD   tadalafil (CIALIS) 20 MG tablet Take 20 mg by mouth as needed Yes Historical Provider, MD   B Complex-C (B COMPLEX-VITAMIN C PO) Take by mouth Yes Historical Provider, MD   vitamin D (ERGOCALCIFEROL) 1.25 MG (35373 UT) CAPS capsule Take 1 capsule by mouth once a week Yes Elizabeth Velasco MD   finasteride (PROSCAR) 5 MG tablet Take 5 mg by mouth daily Yes Historical Provider, MD   propranolol (INDERAL) 40 MG tablet Take 1 tablet by mouth 2 times daily Yes Elizabeth Velasco MD   b complex vitamins capsule Take 1 capsule by mouth daily Yes Historical Provider, MD   aspirin 81 MG tablet Take 81 mg by mouth daily. Yes Historical Provider, MD   Omega-3 Fatty Acids (OMEGA 3 PO) Take 1 tablet by mouth daily. Yes Historical Provider, MD   tamsulosin (FLOMAX) 0.4 MG capsule Take 0.4 mg by mouth daily. Yes Historical Provider, MD   Coenzyme Q10 (CO Q 10 PO) Take 200 mg by mouth daily.  Yes Historical Provider, MD   loratadine (CLARITIN) 10 MG capsule Take by mouth Historical Provider, MD       Past Medical History:   Diagnosis Date    Cancer [facility]) 2011    colon    Hyperlipidemia     [facility]       Past Surgical History:   Procedure Laterality Date    COLON SURGERY  12/06/2010    resection CA    COLONOSCOPY  11/25/2013    Dr. Regis Melo COLONOSCOPY  01/18/2016    Dr. Nilay Castorena    left foot    HERNIA REPAIR  11/15/2004    Dr. Camryn Coker       Family History   Problem Relation Age of Onset    Cancer Mother         breast    Heart Disease Father     Heart Disease Brother        CareTeam (Including outside providers/suppliers regularly involved in providing care):   Patient Care Team:  Tyesha Denise MD as PCP - General (Family Medicine)  Tyesha Denise MD as PCP - [facility] Empaneled Provider    Wt Readings from Last 3 Encounters:   09/13/21 201 lb 9.6 oz (91.4 kg)   12/15/20 202 lb 9.6 oz (91.9 kg)   01/14/20 211 lb 9.6 oz (96 kg)     Vitals:    09/13/21 0808 09/13/21 0830   BP: (!) 142/80 138/74   Site: Right Upper Arm    Position: Sitting    Cuff Size: Large Adult    Pulse: 64    Resp: 20    Temp: 98 °F (36.7 °C)    TempSrc: Oral    SpO2: 98%    Weight: 201 lb 9.6 oz (91.4 kg)    Height: 5' 10\" (1.778 m)      Body mass index is 28.93 kg/m². Based upon direct observation of the patient, evaluation of cognition reveals recent and remote memory intact. Patient's complete Health Risk Assessment and screening values have been reviewed and are found in Flowsheets. The following problems were reviewed today and where indicated follow up appointments were made and/or referrals ordered. Positive Risk Factor Screenings with Interventions:          General Health and ACP:  General  In general, how would you say your health is?: Very Good  In the past 7 days, have you experienced any of the following?  New or Increased Pain, New or Increased Fatigue, Loneliness, Social Isolation, Stress or Anger?: None of These  Do you get the social

## 2021-10-08 DIAGNOSIS — I10 ESSENTIAL HYPERTENSION: ICD-10-CM

## 2021-10-11 RX ORDER — PROPRANOLOL HYDROCHLORIDE 40 MG/1
TABLET ORAL
Qty: 60 TABLET | Refills: 5 | Status: SHIPPED | OUTPATIENT
Start: 2021-10-11 | End: 2022-01-20 | Stop reason: ALTCHOICE

## 2021-11-11 DIAGNOSIS — E55.9 VITAMIN D DEFICIENCY: ICD-10-CM

## 2021-11-11 RX ORDER — ERGOCALCIFEROL 1.25 MG/1
CAPSULE ORAL
Qty: 12 CAPSULE | Refills: 1 | Status: SHIPPED | OUTPATIENT
Start: 2021-11-11 | End: 2022-05-26 | Stop reason: SDUPTHER

## 2022-01-20 ENCOUNTER — OFFICE VISIT (OUTPATIENT)
Dept: FAMILY MEDICINE CLINIC | Age: 77
End: 2022-01-20
Payer: MEDICARE

## 2022-01-20 VITALS
HEART RATE: 59 BPM | WEIGHT: 207 LBS | OXYGEN SATURATION: 99 % | RESPIRATION RATE: 20 BRPM | TEMPERATURE: 97.3 F | BODY MASS INDEX: 29.7 KG/M2 | SYSTOLIC BLOOD PRESSURE: 164 MMHG | DIASTOLIC BLOOD PRESSURE: 98 MMHG

## 2022-01-20 DIAGNOSIS — I10 ESSENTIAL HYPERTENSION: Primary | ICD-10-CM

## 2022-01-20 PROCEDURE — G8484 FLU IMMUNIZE NO ADMIN: HCPCS | Performed by: NURSE PRACTITIONER

## 2022-01-20 PROCEDURE — 1036F TOBACCO NON-USER: CPT | Performed by: NURSE PRACTITIONER

## 2022-01-20 PROCEDURE — 4040F PNEUMOC VAC/ADMIN/RCVD: CPT | Performed by: NURSE PRACTITIONER

## 2022-01-20 PROCEDURE — 1123F ACP DISCUSS/DSCN MKR DOCD: CPT | Performed by: NURSE PRACTITIONER

## 2022-01-20 PROCEDURE — 99213 OFFICE O/P EST LOW 20 MIN: CPT | Performed by: NURSE PRACTITIONER

## 2022-01-20 PROCEDURE — G8427 DOCREV CUR MEDS BY ELIG CLIN: HCPCS | Performed by: NURSE PRACTITIONER

## 2022-01-20 PROCEDURE — G8417 CALC BMI ABV UP PARAM F/U: HCPCS | Performed by: NURSE PRACTITIONER

## 2022-01-20 RX ORDER — LISINOPRIL 10 MG/1
10 TABLET ORAL DAILY
Qty: 30 TABLET | Refills: 5 | Status: SHIPPED | OUTPATIENT
Start: 2022-01-20 | End: 2022-08-16 | Stop reason: SDUPTHER

## 2022-01-20 ASSESSMENT — ENCOUNTER SYMPTOMS: SHORTNESS OF BREATH: 0

## 2022-01-20 NOTE — PROGRESS NOTES
SUBJECTIVE:  Elevated blood pressure   Patient ID: Patel Swann is a 67 y.o. male. HPI:   Hypertension  Associated symptoms include headaches. Pertinent negatives include no chest pain or shortness of breath. 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 I did knows where in January 2020 his blood pressure was elevated also at that time were not going to go with Inderal he is having enough problems with fatigue and then when he stands up he has to get his balance before he goes forward so were going to try lisinopril and work on bring it down gradually he is to check his blood pressure with a cuff around the arm and not the wrist and monitor his heart rate as well and come in anywhere from 2 to 4 weeks he has no questions at this time    Past Medical History:   Diagnosis Date    Cancer [facility]) 2011    colon    Hyperlipidemia     Port-A-Cath in place     [facility]      Prior to Visit Medications    Medication Sig Taking?  Authorizing Provider   lisinopril (PRINIVIL;ZESTRIL) 10 MG tablet Take 1 tablet by mouth daily Yes NoreLAKEISHA Núñez   vitamin D (ERGOCALCIFEROL) 1.25 MG (25103 UT) CAPS capsule TAKE ONE CAPSULE BY MOUTH WEEKLY Yes Iliana Shay MD   levocetirizine (XYZAL) 5 MG tablet Take 5 mg by mouth every evening Yes Historical Provider, MD   tadalafil (CIALIS) 20 MG tablet Take 20 mg by mouth as needed Yes Historical Provider, MD   B Complex-C (B COMPLEX-VITAMIN C PO) Take by mouth Yes Historical Provider, MD   finasteride (PROSCAR) 5 MG tablet Take 5 mg by mouth daily Yes Historical Provider, MD   b complex vitamins capsule Take 1 capsule by mouth daily Yes Historical Provider, MD   aspirin 81 MG tablet Take 81 mg by mouth daily. Yes Historical Provider, MD   Omega-3 Fatty Acids (OMEGA 3 PO) Take 1 tablet by mouth daily. Yes Historical Provider, MD   Coenzyme Q10 (CO Q 10 PO) Take 200 mg by mouth daily. Yes Historical Provider, MD   tamsulosin (FLOMAX) 0.4 MG capsule Take 0.4 mg by mouth daily. Patient not taking: Reported on 1/20/2022  Historical Provider, MD      Allergies   Allergen Reactions    Demerol Hcl [Meperidine] Other (See Comments)     Hypotension       Review of Systems   Constitutional: Positive for fatigue. HENT: Negative for tinnitus. Eyes: Positive for visual disturbance. Respiratory: Negative for shortness of breath. Cardiovascular: Negative for chest pain and leg swelling. Neurological: Positive for dizziness and headaches. OBJECTIVE:    Physical Exam  Vitals reviewed. Constitutional:       Appearance: Normal appearance. He is well-developed. HENT:      Head: Normocephalic and atraumatic. Right Ear: Tympanic membrane, ear canal and external ear normal. There is no impacted cerumen. Left Ear: Tympanic membrane, ear canal and external ear normal. There is no impacted cerumen. Nose: Nose normal.      Mouth/Throat:      Lips: Pink. Mouth: Mucous membranes are moist.      Dentition: Normal dentition. Tongue: No lesions. Pharynx: Oropharynx is clear. Uvula midline. Tonsils: No tonsillar exudate or tonsillar abscesses. Eyes:      General: Lids are normal.         Right eye: No discharge. Left eye: No discharge. Extraocular Movements:      Right eye: Normal extraocular motion. Left eye: Normal extraocular motion. Conjunctiva/sclera: Conjunctivae normal.      Right eye: Right conjunctiva is not injected. Left eye: Left conjunctiva is not injected. Pupils: Pupils are equal, round, and reactive to light.    Neck:      Thyroid: Resp 20   Wt 207 lb (93.9 kg)   SpO2 99%   BMI 29.70 kg/m²      ASSESSMENT:      ICD-10-CM    1. Essential hypertension  I10 lisinopril (PRINIVIL;ZESTRIL) 10 MG tablet       PLAN:  RTc in 2-4 weeks with monitoring of BP and heart rate   Adilia Mages: Hypertension (189/102 this morning . is on medication for this . his last visit was his medicare visit . has not had a follow up . this problem has been ongoing . he is handling some personl issues . has been taking Propranolol 1/2 in am 1/2 in pm last took this morning )      Diagnosis and orders for this visit are above.

## 2022-01-27 ENCOUNTER — TELEPHONE (OUTPATIENT)
Dept: FAMILY MEDICINE CLINIC | Age: 77
End: 2022-01-27

## 2022-01-27 RX ORDER — PROPRANOLOL HYDROCHLORIDE 40 MG/1
40 TABLET ORAL 2 TIMES DAILY
Qty: 60 TABLET | Refills: 5 | Status: SHIPPED | OUTPATIENT
Start: 2022-01-27 | End: 2022-08-16 | Stop reason: SDUPTHER

## 2022-01-27 NOTE — TELEPHONE ENCOUNTER
Blood pressure readings are better however his tremors have gotten worse will reinstitute his Inderal but continue his lisinopril because of blood pressure is much better controlled

## 2022-02-17 ENCOUNTER — LAB (OUTPATIENT)
Dept: LAB | Facility: HOSPITAL | Age: 77
End: 2022-02-17

## 2022-02-17 DIAGNOSIS — C18.9 ADENOCARCINOMA OF COLON: ICD-10-CM

## 2022-02-17 LAB
ALBUMIN SERPL-MCNC: 4.1 G/DL (ref 3.5–5.2)
ALBUMIN/GLOB SERPL: 1.6 G/DL
ALP SERPL-CCNC: 77 U/L (ref 39–117)
ALT SERPL W P-5'-P-CCNC: 44 U/L (ref 1–41)
ANION GAP SERPL CALCULATED.3IONS-SCNC: 9 MMOL/L (ref 5–15)
AST SERPL-CCNC: 32 U/L (ref 1–40)
BASOPHILS # BLD AUTO: 0.04 10*3/MM3 (ref 0–0.2)
BASOPHILS NFR BLD AUTO: 0.7 % (ref 0–1.5)
BILIRUB SERPL-MCNC: 0.7 MG/DL (ref 0–1.2)
BUN SERPL-MCNC: 17 MG/DL (ref 8–23)
BUN/CREAT SERPL: 18.5 (ref 7–25)
CALCIUM SPEC-SCNC: 9.2 MG/DL (ref 8.6–10.5)
CEA SERPL-MCNC: 1.62 NG/ML
CHLORIDE SERPL-SCNC: 107 MMOL/L (ref 98–107)
CO2 SERPL-SCNC: 26 MMOL/L (ref 22–29)
CREAT SERPL-MCNC: 0.92 MG/DL (ref 0.76–1.27)
DEPRECATED RDW RBC AUTO: 45 FL (ref 37–54)
EOSINOPHIL # BLD AUTO: 0.14 10*3/MM3 (ref 0–0.4)
EOSINOPHIL NFR BLD AUTO: 2.5 % (ref 0.3–6.2)
ERYTHROCYTE [DISTWIDTH] IN BLOOD BY AUTOMATED COUNT: 12.3 % (ref 12.3–15.4)
FERRITIN SERPL-MCNC: 660.7 NG/ML (ref 30–400)
GFR SERPL CREATININE-BSD FRML MDRD: 80 ML/MIN/1.73
GLOBULIN UR ELPH-MCNC: 2.5 GM/DL
GLUCOSE SERPL-MCNC: 137 MG/DL (ref 65–99)
HCT VFR BLD AUTO: 43.9 % (ref 37.5–51)
HGB BLD-MCNC: 14.9 G/DL (ref 13–17.7)
IMM GRANULOCYTES # BLD AUTO: 0.01 10*3/MM3 (ref 0–0.05)
IMM GRANULOCYTES NFR BLD AUTO: 0.2 % (ref 0–0.5)
IRON 24H UR-MRATE: 138 MCG/DL (ref 59–158)
IRON SATN MFR SERPL: 37 % (ref 20–50)
LYMPHOCYTES # BLD AUTO: 1.68 10*3/MM3 (ref 0.7–3.1)
LYMPHOCYTES NFR BLD AUTO: 29.5 % (ref 19.6–45.3)
MCH RBC QN AUTO: 33.3 PG (ref 26.6–33)
MCHC RBC AUTO-ENTMCNC: 33.9 G/DL (ref 31.5–35.7)
MCV RBC AUTO: 98.2 FL (ref 79–97)
MONOCYTES # BLD AUTO: 0.45 10*3/MM3 (ref 0.1–0.9)
MONOCYTES NFR BLD AUTO: 7.9 % (ref 5–12)
NEUTROPHILS NFR BLD AUTO: 3.38 10*3/MM3 (ref 1.7–7)
NEUTROPHILS NFR BLD AUTO: 59.2 % (ref 42.7–76)
NRBC BLD AUTO-RTO: 0 /100 WBC (ref 0–0.2)
PLATELET # BLD AUTO: 220 10*3/MM3 (ref 140–450)
PMV BLD AUTO: 9 FL (ref 6–12)
POTASSIUM SERPL-SCNC: 4.7 MMOL/L (ref 3.5–5.2)
PROT SERPL-MCNC: 6.6 G/DL (ref 6–8.5)
RBC # BLD AUTO: 4.47 10*6/MM3 (ref 4.14–5.8)
SODIUM SERPL-SCNC: 142 MMOL/L (ref 136–145)
TIBC SERPL-MCNC: 373 MCG/DL (ref 298–536)
TRANSFERRIN SERPL-MCNC: 250 MG/DL (ref 200–360)
WBC NRBC COR # BLD: 5.7 10*3/MM3 (ref 3.4–10.8)

## 2022-02-17 PROCEDURE — 82378 CARCINOEMBRYONIC ANTIGEN: CPT

## 2022-02-17 PROCEDURE — 83540 ASSAY OF IRON: CPT

## 2022-02-17 PROCEDURE — 85025 COMPLETE CBC W/AUTO DIFF WBC: CPT

## 2022-02-17 PROCEDURE — 80053 COMPREHEN METABOLIC PANEL: CPT

## 2022-02-17 PROCEDURE — 84466 ASSAY OF TRANSFERRIN: CPT

## 2022-02-17 PROCEDURE — 82728 ASSAY OF FERRITIN: CPT

## 2022-02-17 PROCEDURE — 36415 COLL VENOUS BLD VENIPUNCTURE: CPT

## 2022-03-06 NOTE — PROGRESS NOTES
[facility]  [facility] ONCOLOGY  [address]  [place] [address]  [phone]    Patient Name: Migue Rodriguez  Encounter Date: 02/10/2022  YOB: 1945  Patient Number: [id number]       REASON FOR VISIT: Mr. Migue Rodriguez is a 76-year-old male who returns in follow-up of resected stage III colon cancer. He is seen 140 months since receipt of cycle 12 adjuvant FOLFOX chemotherapy. He is here alone (usually with his spouse, Ana).      I have reviewed the HPI and verified with the patient the accuracy of it. No changes to interval history since the information was documented. Mariano Yu MD 03/10/22     DIAGNOSTIC ABNORMALITIES:   1. Colonoscopy, 11/08/2010: The study revealed a large polyp at about 22 cm, just above the rectosigmoid junction. This was removed via snare cautery. No other abnormalities were seen to the level of the cecum. Pathology of the colon polyp at 20 cm revealed a tubulovillous adenoma demonstrating moderate atypia. Biopsies from the rectosigmoid junction at that level also revealed fragments of tubulovillous adenoma demonstrating moderate atypia.  2. CT of the abdomen and pelvis, 11/30/2010: Revealed an eccentric mass involving the sigmoid colon. There was also circumferential thickening of the cecum suspicious for malignancy. There was prominent adjacent node seen in the mesentery just medial to the cecum. The largest measuring 1.0 x 1.5 cm in diameter. There was acentric lobular densities seen involving the sigmoid colon. The prostate appeared mildly enlarged. No other abnormalities identified.  3. Labs, 12/02/2010: BMP was normal with a creatinine 1.2, a CBC showed a hemoglobin of 9, a hematocrit 29.5, MCV 70.4, MCH 21.5, MCHC 30.5 (each depressed), platelets 530,000, WBC 7.8 with a normal differential.  4. Labs, 01/26/2011: CMP revealed eGFR of 79.5, phosphorus of 4.7. CEA of 1.4. ferritin of 12, iron of 35, TIBC of 434, iron saturation  of 8%, vitamin B12 of 374, folate of 12.8, PSA of 4.2.  5. CT chest, abdomen, and pelvis 01/28/2011 [facility]. Negative chest CT. Lungs are clear. Negative upper abdominal CT. The liver is clear. Bowel pattern is normal. Benign 2.3 cm cyst upper pole left kidney. Negative pelvic CT. Previous rectal surgery. No adenopathy or free fluid noted.  6. Bone scan 01/28/2011, [facility]. Seen projected over the left ankle, there is a single tiny focus of slightly increased radiopharmaceutical uptake consistent with degenerative change. The distribution of radiopharmaceutical is otherwise within normal limits, specifically no suggestion of osseous metastatic disease.    PREVIOUS INTERVENTIONS:   1. Surgery and laparotomy, 12/06/2010. Intraoperatively, a large palpable mass was encountered in the cecum. Palpation of the liver did not demonstrate any obvious abnormalities. There was no study of the abdomen. The omentum was wrapped around the cecal mass. The tumor was described to be large enough to feel through the wall of the mucosa though there did not seem to be any transmural invasion of the tumor at the rectosigmoid junction. The mass itself was attached to the lateral and anterior abdominal wall, thus a portion of the abdominal wall was resected with the tumor. The sigmoid colon was redundant, thus a generous portion of the sigmoid colon was resected. There was a large tumor at least 2 cm within the sigmoid. The lower anterior resection and right colectomy were completed successfully. 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 The lesion infiltrated through the full thickness of the colonic wall to involve the pericolonic adipose tissue. Surgical margins of excision were negative for malignancy. One lymph node was negative for evidence of malignancy.  2. INFeD 1000 mg, 01/27/2011 and 02/03/2011 (2000  mg).  3. FOLFOX, 02/14/2011 through 07/25/2011. 12 cycles.        Problem List Items Addressed This Visit    None       Oncology/Hematology History   Adenocarcinoma of colon (HCC)   2/23/2020 Initial Diagnosis    Adenocarcinoma of colon (CMS/HCC)     2/23/2020 Cancer Staged    Staging form: Colon And Rectum, AJCC 8th Edition  - Clinical stage from 2/23/2020: Stage IIIB (cT3, cN1, cM0) - Signed by Mariano Yu MD on 2/24/2020         PAST MEDICAL HISTORY:  ALLERGIES:  Allergies   Allergen Reactions   • Demerol [Meperidine]      CURRENT MEDICATIONS:  Outpatient Encounter Medications as of 3/10/2022   Medication Sig Dispense Refill   • aspirin 81 MG EC tablet Take 81 mg by mouth Daily.     • B Complex-C (SUPER B COMPLEX PO) Take  by mouth.     • Coenzyme Q10 200 MG capsule Take 200 mg by mouth Daily.     • finasteride (PROSCAR) 5 MG tablet Take 1 tablet by mouth Daily. 90 tablet 4   • levocetirizine (XYZAL) 5 MG tablet Take 5 mg by mouth Every Evening.     • lisinopril (PRINIVIL,ZESTRIL) 10 MG tablet Take 10 mg by mouth Daily.     • multivitamin (MULTI-VITAMIN DAILY PO) Take  by mouth Daily.     • Omega-3 Fatty Acids (FISH OIL) 1000 MG capsule capsule Take  by mouth Daily With Breakfast.     • propranolol (INDERAL) 40 MG tablet Take 40 mg by mouth 2 (Two) Times a Day.     • tadalafil (Cialis) 20 MG tablet Take 1 tablet by mouth As Needed for Erectile Dysfunction. 10 tablet 11   • tamsulosin (FLOMAX) 0.4 MG capsule 24 hr capsule Take 1 capsule by mouth Daily. nightly 90 capsule 4   • vitamin D (ERGOCALCIFEROL) 1.25 MG (11467 UT) capsule capsule Take 50,000 Units by mouth 1 (One) Time Per Week.     • [DISCONTINUED] Loratadine 10 MG capsule Take  by mouth.       No facility-administered encounter medications on file as of 3/10/2022.     ADULT ILLNESSES:   Adenocarcinoma of cecum (disorder) ( ICD-10:C18.0 ;Malignant neoplasm of cecum   Benign prostatic hypertrophy ( Prostate biopsy 04/03/2012 (path benign).   Reji following; ICD-10:N40.0 ;Enlarged prostate without lower urinary tract symptoms )   Hyperlipidemia ( ICD-10:E78.5 ;Hyperlipidemia, unspecified   Leukopenia ( ICD-10:D72.819 ;Decreased white blood cell count, unspecified   Microcytic anemia ( ICD-10:D50.9 ;Iron deficiency anemia, unspecified   Neuropathy ( ICD-10:G62.9 ;Polyneuropathy, unspecified   Peritonitis ( 1956; ICD-10:K65.9 ;Peritonitis, unspecified )   Seasonal nasal allergies ( ICD-10:J30.1 ;Allergic rhinitis due to pollen   Weight gain ( ICD-10:R63.5 ;Abnormal weight gain    SURGERIES:   Colectomy, 12/2010   Port placement, 02/07/2011. Dr. Mancini   Prostate biopsy on [facility]/03/[facility] revealed benign prostatic glands and stroma, 08/07/2012   Colonoscopy, 11/08/2010   Left inguinal hernia repair, 2004. Dr. Mayorga   Report of surveillance colonoscopy last 01/18/2016 from Dr. Mancini. Normal.   Port removal, 08/01/2016. Dr. Mancini.      ADULT ILLNESSES:  Patient Active Problem List   Diagnosis Code   • Urgency of urination R39.15   • Elevated PSA R97.20   • BPH (benign prostatic hypertrophy) with urinary obstruction N40.1, N13.8   • Hydrocele N43.3   • Adenocarcinoma of colon (HCC) C18.9   • Colon cancer (HCC) C18.9   • Other and unspecified hyperlipidemia E78.5     SURGERIES:  Past Surgical History:   Procedure Laterality Date   • APPENDECTOMY     • COLONOSCOPY     • FOOT SURGERY     • HERNIA REPAIR     • PROSTATE BIOPSY       HEALTH MAINTENANCE ITEMS:  Health Maintenance Due   Topic Date Due   • COLONOSCOPY  Never done   • TDAP/TD VACCINES (1 - Tdap) Never done   • ZOSTER VACCINE (1 of 2) Never done   • Pneumococcal Vaccine 65+ (1 of 1 - PPSV23) Never done   • HEPATITIS C SCREENING  Never done   • LIPID PANEL  12/16/2021       <no information>  Last Completed Colonoscopy     This patient has no relevant Health Maintenance data.          There is no immunization history on file for this patient.  Last Completed Mammogram     This patient has no  "relevant Health Maintenance data.            FAMILY HISTORY:  Family History   Problem Relation Age of Onset   • No Known Problems Father    • No Known Problems Mother      SOCIAL HISTORY:  Social History     Socioeconomic History   • Marital status:    Tobacco Use   • Smoking status: Never Smoker   • Smokeless tobacco: Never Used   Substance and Sexual Activity   • Alcohol use: Yes       REVIEW OF SYSTEMS:  Constitutional:   The patient's appetite and energy are fairly good. \"Ok for me.\" He has regained 2 pounds (had lost 15 pounds at his 2 prior visits) since the last visit. \"Still have stress with my wife and her dementia.\" He has no fevers, chills, or drenching night sweats. His sleep habits seem appropriate.  Has received Covid vaccines x 3 last 11/2021  Ear/Nose/Throat/Mouth:   He has no ear pains but has perennial sinus allergy symptoms. He has no sore throat, nosebleeds, or sore tongue. He has no headaches. He denies any hoarseness, change in voice quality, or hemoptysis.  Ocular:   He denies eye pain, significant change in visual acuity, double vision, or blurry vision.  Respiratory:   He has no chronic cough, significant shortness of breathing, phlegm production, or unexplained chest wall pain.    Cardiovascular:   He has no exertional chest pain, chest pressure, or chest heaviness. He reports no claudication. He reports no palpitations or symptomatic orthostasis.  Gastrointestinal:   He reports no dysphagia, nausea, vomiting, postprandial abdominal pain, bloating, cramping. He has not noted any bouts of rectal bleeding after bowel movements and on toilet paper. He was seen by Dr. Mancini last 05/24/2011 for anal fissures. He reports no bouts constipation. His bowels are formed and regular, moving at least 1-2 times daily. Surveillance c-scope by Dr. Mancini last 02/2019.  \"All clear.\"  Genitourinary:   He has no urinary burning, frequency, dribbling. He reports no difficulty controlling his bladder. He " "needs to urinate up to 2-3 times through the night, better on finasteride. He is followed by Dr. Hudson for urology. Had a prostate biopsy last 04/03/2012. Path was benign.  Musculoskeletal:   He reports no unexplained arthralgias, myalgias, but has nighttime leg cramping.  Extremities:   He reports no trouble with fluid retention or significant leg swelling.  Endocrine:   He reports no problems with excess thirst, excessive urination, vasomotor instability, or unexplained fatigue.  Heme/Lymphatic:   He reports no unexplained bleeding, bruising, petechial rashes, or swollen glands.  Skin:   He reports no new \"skin spots.\" Has been seen by dermatology (Dr. Alexandra in [place]) sometime 12/2019. Denies itching, rashes, or lesions which won't heal.  Neuro:   He reports no loss of consciousness, seizures, fainting spells, or dizziness. He reports no weakness of his face, arms, or legs. He has no difficulty with speech. He has no tremors. He still has minimal residual cold associated paresthesias of the hands and feet, very minimal on B complex multivitamin. \"Some.\"  Psych:   He seems generally satisfied with life. He denies depression but admits to situational anxiety. He reports no mood swings.    VITAL SIGNS: /90   Pulse 66   Temp 96.6 °F (35.9 °C)   Resp 16   Ht 177.8 cm (70\")   Wt 93.4 kg (205 lb 12.8 oz)   SpO2 98%   BMI 29.53 kg/m² Body surface area is 2.11 meters squared.  Pain Score    03/10/22 0915   PainSc: 0-No pain         PHYSICAL EXAMINATION:   General:   He is a pleasant, obese, well-developed, well-nourished and well-kept elderly male who is comfortable at rest. He arrived in the exam room ambulatory. He appears to be his stated age. His skin color is normal. ECOG PS = 0 (same).  Head/Neck:   The patient is anicteric and atraumatic. The mouth, and throat are clear. The trachea is midline. The neck is supple without evidence of jugular venous distention or cervical adenopathy.  Eyes:   The " pupils are equal, round, and reactive to light. The extraocular movements are full. There is no scleral jaundice or erythema.  Chest:   The respiratory efforts are normal and unhindered. The chest is clear to auscultation and percussion. There are no wheezes, rhonchi, rales, or asymmetry of breath sounds. The port in the left upper chest is well seated.  Cardiovascular:   The patient has a regular cardiac rate and rhythm without murmurs, rubs, or gallops. The peripheral pulses are equal and full.  Abdomen:   The belly is soft and globose. The firm, nontender subcutaneous nodule just right of the epigastrium that feels cystic than solid, again ~ 4 x 3 cm (grossly unchanged; previously up to ~ 5 x 6 cm). A similar lesion of similar consistency to the left of the midline is noted, measuring approximately 4 x 3 cm (unchanged). The midline longitudinal scar is noted. There remains subtle fullness underlying an older paraumbilical scar on the right (unchanged from prior exams). There is no rebound or guarding. There is no organomegaly, mass-effect, or tenderness. Bowel sounds are active and of normal character.  Extremities:   There is no evidence of cyanosis, clubbing, or edema.  Rheumatologic:   There is no overt evidence of rheumatoid deformities of the hands. There is no sausaging of the fingers. There is no sign of active synovitis. The gait is normal.  Cutaneous:   There are no overt rashes, disseminated lesions, purpura, or petechiae.  Lymphatics:   There is no evidence of adenopathy in the cervical, supraclavicular, axillary, inguinal, or femoral areas.  Neurologic:   The patient is alert, oriented, cooperative, and pleasant. He is appropriately conversant. He ambulated into the exam room without assistance and transferred from chair to exam table unaided. There is no overt dysfunction of the motor, sensory, or cerebellar systems.  Psych:   Mood and affect are appropriate for circumstance. Eye contact is  "appropriate.      ASSESSMENT:   1. 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   Stage: IIIB (pT3, pN1, Mx).   Tumor [place]: Cecal mass with infiltration through the full thickness of the colonic wall to involve the pericolonic adipose tissue (T3), and 1/15 regional lymph nodes involved with metastatic carcinoma.   Complication of Tumor: None   Tumor Status: Grossly resected, 01/07/2011. Adjuvant systemic therapy completed.   03/08/2019-colonoscopy by Dr. Mancini--.  Normal rectum.  20 cm from the anal verge anatomy consistent with anastomosis.  No abnormal findings.  Prior colon resection in 2 different locations from synchronous colon cancer.  2. Microcytic anemia from iron deficiency related to the colon malignancy + chemo.   --Resolved.  Hgb 14.9, 02/17/2022 (prior range: HGB 12.8 - 18).   3. Trivially elevated ALT (fatty liver?).   4. Benign prostatic hypertrophy (BPH) with PSA = 5.8, 01/14/2020 (prior range: 3.7 - 4.33). Prostate biopsy, 04/03/2012 (path benign). Will defer monitoring of PSA to urology (Dr. Hudson).   5. Neuropathy, chemo residual. Subjectively minimal on B-complex multivitamin.   6. Weight gain from calories greater than expenditure, gaining 4 pounds since his last visit.   7. Firm, nontender subcutaneous nodule just right of the epigastrium that feels more solid than cystic, measuring approximately 4 x 3 cm (previously 5 x 6 cm) and another just left of the midline 4 x 4 cm (same). Ultrasound (above) confirms multiple solid subcutaneous nodules, lipomas, or other benign nodules. Has been seen by Dr. Mancini.   8. Keratotic skin lesions (face and back). Followed by dermatology in [place]. Previously discussed 08/29/2014 letter from Dr. Shahid Vasquez Re: Patient did not keep 08/21/2014 appointment. Said that he saw a \"skin doctor\" in [place], now thinks it is Dr. Conklin, was told everything alright and to come back in 1 year.     PLAN:   1.  Apprised of labs from 02/17/2022 with normal CBC " repleted iron levels, trivially elevated ALT otherwise normal CMP, and normal CEA.   2.  Review report of c-scope last 03/08/2019 (above).  SEBASTIAN.  3. The role of adjuvant chemotherapy was previously discussed. I had explained that benefit has been unequivocally demonstrated (reduction in recurrence and mortality) for patients with resected Stage III colorectal cancer. The benefits of adjuvant chemotherapy (adjusted for age, tumor grade, lymph node involvement, and type of chemo regimen) were specifically outlined:  a. Reduction in mortality: 48%. 5 year absolute benefit: 6.2/14.4 alive.  b. Reduction in recurrence: 48%. 5 year absolute benefit: 7.8/18 without relapse.  4. Observe  5. Continue other currently identified medications.  6. Continue ongoing management per primary care, and other specialists.   7. Return to the office with pre-office (specify fasting) iron, fe sat, ferritin, CMP, CEA and CBC with differential in 12 months.      I spent ~30 minutes caring for Migue on this date of service. This time includes time spent by me in the following activities: preparing for the visit, reviewing tests, performing a medically appropriate examination and/or evaluation, counseling and educating the patient/family/caregiver, ordering medications, tests, or procedures and documenting information in the medical record        cc: MD Bladimir Rooney MD

## 2022-03-10 ENCOUNTER — OFFICE VISIT (OUTPATIENT)
Dept: ONCOLOGY | Facility: CLINIC | Age: 77
End: 2022-03-10

## 2022-03-10 VITALS
HEIGHT: 70 IN | OXYGEN SATURATION: 98 % | BODY MASS INDEX: 29.46 KG/M2 | DIASTOLIC BLOOD PRESSURE: 90 MMHG | HEART RATE: 66 BPM | TEMPERATURE: 96.6 F | SYSTOLIC BLOOD PRESSURE: 144 MMHG | RESPIRATION RATE: 16 BRPM | WEIGHT: 205.8 LBS

## 2022-03-10 DIAGNOSIS — C18.9 ADENOCARCINOMA OF COLON: Primary | ICD-10-CM

## 2022-03-10 PROCEDURE — 99214 OFFICE O/P EST MOD 30 MIN: CPT | Performed by: INTERNAL MEDICINE

## 2022-03-10 RX ORDER — LISINOPRIL 10 MG/1
10 TABLET ORAL DAILY
COMMUNITY
Start: 2022-02-22

## 2022-03-10 RX ORDER — DIPHENOXYLATE HYDROCHLORIDE AND ATROPINE SULFATE 2.5; .025 MG/1; MG/1
TABLET ORAL DAILY
COMMUNITY

## 2022-05-26 DIAGNOSIS — E55.9 VITAMIN D DEFICIENCY: ICD-10-CM

## 2022-05-26 RX ORDER — ERGOCALCIFEROL 1.25 MG/1
50000 CAPSULE ORAL WEEKLY
Qty: 4 CAPSULE | Refills: 0 | Status: SHIPPED | OUTPATIENT
Start: 2022-05-26 | End: 2022-08-16 | Stop reason: SDUPTHER

## 2022-06-30 ENCOUNTER — OFFICE VISIT (OUTPATIENT)
Dept: UROLOGY | Facility: CLINIC | Age: 77
End: 2022-06-30

## 2022-06-30 VITALS — WEIGHT: 205 LBS | BODY MASS INDEX: 29.35 KG/M2 | HEIGHT: 70 IN | TEMPERATURE: 98.2 F

## 2022-06-30 DIAGNOSIS — N52.1 ERECTILE DYSFUNCTION DUE TO DISEASES CLASSIFIED ELSEWHERE: ICD-10-CM

## 2022-06-30 DIAGNOSIS — R39.15 URGENCY OF URINATION: ICD-10-CM

## 2022-06-30 DIAGNOSIS — N40.1 BENIGN PROSTATIC HYPERPLASIA WITH LOWER URINARY TRACT SYMPTOMS, SYMPTOM DETAILS UNSPECIFIED: Primary | ICD-10-CM

## 2022-06-30 LAB
BILIRUB BLD-MCNC: NEGATIVE MG/DL
CLARITY, POC: CLEAR
COLOR UR: YELLOW
GLUCOSE UR STRIP-MCNC: NEGATIVE MG/DL
KETONES UR QL: NEGATIVE
LEUKOCYTE EST, POC: NEGATIVE
NITRITE UR-MCNC: NEGATIVE MG/ML
PH UR: 6 [PH] (ref 5–8)
PROT UR STRIP-MCNC: NEGATIVE MG/DL
RBC # UR STRIP: ABNORMAL /UL
SP GR UR: 1.03 (ref 1–1.03)
UROBILINOGEN UR QL: NORMAL

## 2022-06-30 PROCEDURE — 99214 OFFICE O/P EST MOD 30 MIN: CPT | Performed by: UROLOGY

## 2022-06-30 RX ORDER — FINASTERIDE 5 MG/1
5 TABLET, FILM COATED ORAL DAILY
Qty: 90 TABLET | Refills: 4 | Status: SHIPPED | OUTPATIENT
Start: 2022-06-30

## 2022-06-30 RX ORDER — AMLODIPINE BESYLATE 5 MG/1
5 TABLET ORAL DAILY
COMMUNITY
End: 2023-03-24 | Stop reason: ALTCHOICE

## 2022-06-30 RX ORDER — BENZONATATE 200 MG/1
200 CAPSULE ORAL 3 TIMES DAILY PRN
COMMUNITY
End: 2023-03-24 | Stop reason: ALTCHOICE

## 2022-06-30 RX ORDER — TAMSULOSIN HYDROCHLORIDE 0.4 MG/1
1 CAPSULE ORAL DAILY
Qty: 90 CAPSULE | Refills: 4 | Status: SHIPPED | OUTPATIENT
Start: 2022-06-30

## 2022-06-30 RX ORDER — CITALOPRAM 20 MG/1
20 TABLET ORAL DAILY
COMMUNITY
End: 2023-03-24 | Stop reason: ALTCHOICE

## 2022-06-30 RX ORDER — TADALAFIL 20 MG/1
TABLET ORAL
Qty: 10 TABLET | Refills: 11 | Status: SHIPPED | OUTPATIENT
Start: 2022-06-30 | End: 2023-03-24 | Stop reason: ALTCHOICE

## 2022-06-30 RX ORDER — ROSUVASTATIN CALCIUM 10 MG/1
10 TABLET, COATED ORAL DAILY
COMMUNITY
End: 2023-03-24 | Stop reason: ALTCHOICE

## 2022-06-30 RX ORDER — QUETIAPINE FUMARATE 25 MG/1
25 TABLET, FILM COATED ORAL NIGHTLY
COMMUNITY
End: 2023-03-24 | Stop reason: ALTCHOICE

## 2022-07-19 DIAGNOSIS — I10 ESSENTIAL HYPERTENSION: ICD-10-CM

## 2022-07-21 RX ORDER — LISINOPRIL 10 MG/1
TABLET ORAL
Qty: 30 TABLET | Refills: 5 | OUTPATIENT
Start: 2022-07-21

## 2022-08-16 ENCOUNTER — OFFICE VISIT (OUTPATIENT)
Dept: FAMILY MEDICINE CLINIC | Age: 77
End: 2022-08-16
Payer: MEDICARE

## 2022-08-16 VITALS
SYSTOLIC BLOOD PRESSURE: 126 MMHG | BODY MASS INDEX: 29.13 KG/M2 | OXYGEN SATURATION: 98 % | DIASTOLIC BLOOD PRESSURE: 84 MMHG | WEIGHT: 203 LBS | HEART RATE: 66 BPM | TEMPERATURE: 98.1 F

## 2022-08-16 DIAGNOSIS — E55.9 VITAMIN D DEFICIENCY: ICD-10-CM

## 2022-08-16 DIAGNOSIS — I10 ESSENTIAL HYPERTENSION: ICD-10-CM

## 2022-08-16 DIAGNOSIS — E78.5 HYPERLIPIDEMIA, UNSPECIFIED HYPERLIPIDEMIA TYPE: ICD-10-CM

## 2022-08-16 DIAGNOSIS — R97.20 ELEVATED PSA: ICD-10-CM

## 2022-08-16 DIAGNOSIS — Z76.89 ENCOUNTER TO ESTABLISH CARE: Primary | ICD-10-CM

## 2022-08-16 DIAGNOSIS — R73.09 ELEVATED GLUCOSE: ICD-10-CM

## 2022-08-16 DIAGNOSIS — C18.9 MALIGNANT NEOPLASM OF COLON, UNSPECIFIED PART OF COLON (HCC): ICD-10-CM

## 2022-08-16 PROBLEM — Z95.828 PORT-A-CATH IN PLACE: Status: ACTIVE | Noted: 2022-08-16

## 2022-08-16 PROCEDURE — 99214 OFFICE O/P EST MOD 30 MIN: CPT | Performed by: NURSE PRACTITIONER

## 2022-08-16 PROCEDURE — 1123F ACP DISCUSS/DSCN MKR DOCD: CPT | Performed by: NURSE PRACTITIONER

## 2022-08-16 RX ORDER — LEVOCETIRIZINE DIHYDROCHLORIDE 5 MG/1
5 TABLET, FILM COATED ORAL NIGHTLY
COMMUNITY

## 2022-08-16 RX ORDER — LISINOPRIL 10 MG/1
10 TABLET ORAL DAILY
Qty: 30 TABLET | Refills: 5 | Status: SHIPPED | OUTPATIENT
Start: 2022-08-16

## 2022-08-16 RX ORDER — PROPRANOLOL HYDROCHLORIDE 40 MG/1
40 TABLET ORAL 2 TIMES DAILY
Qty: 60 TABLET | Refills: 5 | Status: SHIPPED | OUTPATIENT
Start: 2022-08-16

## 2022-08-16 RX ORDER — ERGOCALCIFEROL 1.25 MG/1
50000 CAPSULE ORAL WEEKLY
Qty: 12 CAPSULE | Refills: 1 | Status: SHIPPED | OUTPATIENT
Start: 2022-08-16

## 2022-08-16 ASSESSMENT — PATIENT HEALTH QUESTIONNAIRE - PHQ9
SUM OF ALL RESPONSES TO PHQ QUESTIONS 1-9: 0
2. FEELING DOWN, DEPRESSED OR HOPELESS: 0
SUM OF ALL RESPONSES TO PHQ QUESTIONS 1-9: 0
SUM OF ALL RESPONSES TO PHQ QUESTIONS 1-9: 0
1. LITTLE INTEREST OR PLEASURE IN DOING THINGS: 0
SUM OF ALL RESPONSES TO PHQ QUESTIONS 1-9: 0
SUM OF ALL RESPONSES TO PHQ9 QUESTIONS 1 & 2: 0

## 2022-08-16 ASSESSMENT — ENCOUNTER SYMPTOMS
EYES NEGATIVE: 1
RESPIRATORY NEGATIVE: 1
GASTROINTESTINAL NEGATIVE: 1

## 2022-08-16 NOTE — PROGRESS NOTES
[facility] PHYSICIAN SERVICES  [facility] [place]  [address] [address]  Dept: [phone]  Dept Fax: [phone]  Loc: [phone]    Grazyna Boland is a 65 y.o. male who presents today for his medical conditions/complaints as noted below. Grazyna Boland is c/o of Establish Care      Chief Complaint   Patient presents with    Establish Care       HPI:     HPI  Patient is here to establish care. He was previously seeing Dr Henry Conte. Patient is needing refills on a few medications. Patient has a known history of HTN, vit d def, colon cancer, and elevated PSA. Patient denies any issues or concerns. He does monitor BP at home and reports it is doing well. Past Medical History:   Diagnosis Date    Cancer [facility])     colon    Hyperlipidemia     Port-A-Cath in place     [facility]        Past Surgical History:   Procedure Laterality Date    COLON SURGERY  2010    resection [place]    COLONOSCOPY  2013    Dr. Valentín Hernandez    COLONOSCOPY  2016    Dr. Jaden Cummings    left foot    HERNIA REPAIR  11/15/2004    Dr. Antony Pulido       Social History     Tobacco Use    Smoking status: Former     Packs/day: 1.00     Years: 50.00     Pack years: 50.00     Types: Cigarettes     Quit date: 1990     Years since quittin.6    Smokeless tobacco: Never   Substance Use Topics    Alcohol use: Yes     Alcohol/week: 1.0 standard drink     Types: 1 Glasses of wine per week        Current Outpatient Medications   Medication Sig Dispense Refill    levocetirizine (XYZAL) 5 MG tablet Take 5 mg by mouth nightly      vitamin D (ERGOCALCIFEROL) 1.25 MG (96389 UT) CAPS capsule Take 1 capsule by mouth once a week 12 capsule 1    lisinopril (PRINIVIL;ZESTRIL) 10 MG tablet Take 1 tablet by mouth in the morning. 30 tablet 5    propranolol (INDERAL) 40 MG tablet Take 1 tablet by mouth in the morning and 1 tablet before bedtime.  60 tablet 5    tadalafil (CIALIS) 20 MG tablet Take 20 mg by side and 2+ on the left side. Posterior tibial pulses are 2+ on the right side and 2+ on the left side. Heart sounds: Normal heart sounds. Pulmonary:      Effort: Pulmonary effort is normal.      Breath sounds: Normal breath sounds and air entry. Abdominal:      General: Bowel sounds are normal.      Palpations: Abdomen is soft. Musculoskeletal:      Cervical back: Full passive range of motion without pain, normal range of motion and neck supple. Thoracic back: No tenderness. Normal range of motion. Lumbar back: No tenderness. Normal range of motion. Lymphadenopathy:      Cervical: No cervical adenopathy. Skin:     General: Skin is warm and dry. Capillary Refill: Capillary refill takes less than 2 seconds. Neurological:      General: No focal deficit present. Mental Status: He is alert and oriented to person, place, and time. Mental status is at baseline. Coordination: Coordination is intact. Psychiatric:         Mood and Affect: Mood normal.         Speech: Speech normal.         Behavior: Behavior normal.         Thought Content: Thought content normal.         Cognition and Memory: Cognition and memory normal.         Judgment: Judgment normal.       /84 (Site: Left Upper Arm)   Pulse 66   Temp 98.1 °F (36.7 °C)   Wt 203 lb (92.1 kg)   SpO2 98%   BMI 29.13 kg/m²     Assessment:      Diagnosis Orders   1. Encounter to establish care        2. Vitamin D deficiency  vitamin D (ERGOCALCIFEROL) 1.25 MG (60069 UT) CAPS capsule    Vitamin D 25 Hydroxy      3. Essential hypertension  lisinopril (PRINIVIL;ZESTRIL) 10 MG tablet    propranolol (INDERAL) 40 MG tablet    CBC with Auto Differential    Comprehensive Metabolic Panel    TSH with Reflex to FT4      4. Malignant neoplasm of colon, unspecified part of colon ([facility] [address]        5. Elevated PSA  PSA Screening      6. Hyperlipidemia, unspecified hyperlipidemia type  Lipid, Fasting      7.  Elevated glucose Hemoglobin A1C          No results found for this visit on 08/16/22. Plan:     1. Encounter to establish care      2. Vitamin D deficiency    - vitamin D (ERGOCALCIFEROL) 1.25 MG (83636 UT) CAPS capsule; Take 1 capsule by mouth once a week  Dispense: 12 capsule; Refill: 1  - Vitamin D 25 Hydroxy; Future    3. Essential hypertension    - lisinopril (PRINIVIL;ZESTRIL) 10 MG tablet; Take 1 tablet by mouth in the morning. Dispense: 30 tablet; Refill: 5  - propranolol (INDERAL) 40 MG tablet; Take 1 tablet by mouth in the morning and 1 tablet before bedtime. Dispense: 60 tablet; Refill: 5  - CBC with Auto Differential; Future  - Comprehensive Metabolic Panel; Future  - TSH with Reflex to FT4; Future    4. Malignant neoplasm of colon, unspecified part of colon ([facility] [address]      5. Elevated PSA    - PSA Screening; Future    6. Hyperlipidemia, unspecified hyperlipidemia type    - Lipid, Fasting; Future    7. Elevated glucose    - Hemoglobin A1C; Future      Refills on meds. Checking labs -we will call with these results. Return in about 6 months (around 2/16/2023) for Medicare AWV.     Orders Placed This Encounter   Procedures    CBC with Auto Differential     Standing Status:   Future     Standing Expiration Date:   8/16/2023    Comprehensive Metabolic Panel     Standing Status:   Future     Standing Expiration Date:   8/16/2023    Lipid, Fasting     Standing Status:   Future     Standing Expiration Date:   8/16/2023    TSH with Reflex to FT4     Standing Status:   Future     Standing Expiration Date:   8/16/2023    Vitamin D 25 Hydroxy     Standing Status:   Future     Standing Expiration Date:   8/16/2023    Hemoglobin A1C     Standing Status:   Future     Standing Expiration Date:   8/16/2023    PSA Screening     Standing Status:   Future     Standing Expiration Date:   8/16/2023         Orders Placed This Encounter   Medications    vitamin D (ERGOCALCIFEROL) 1.25 MG (34144 UT) CAPS capsule     Sig: Take 1 capsule by mouth once a week     Dispense:  12 capsule     Refill:  1    lisinopril (PRINIVIL;ZESTRIL) 10 MG tablet     Sig: Take 1 tablet by mouth in the morning. Dispense:  30 tablet     Refill:  5    propranolol (INDERAL) 40 MG tablet     Sig: Take 1 tablet by mouth in the morning and 1 tablet before bedtime. Dispense:  60 tablet     Refill:  5              Patient offered educational handouts and has had all questions answered. Patient voices understanding and agrees to plans along with risks and benefits of plan. Patient is instructed to continue prior meds, diet, and exercise plans as instructed. Patient agrees to follow up as instructed and sooner if needed. Patient agrees to go to ER if condition becomes emergent. EMR Dragon/transcription disclaimer: Some of this encounter note is an electronic transcription/translation of spoken language to printed text. The electronic translation of spoken language may permit erroneous, or at times, nonsensical words or phrases to be inadvertently transcribed.  Although I have reviewed the note for such errors, some may still exist.    Electronically signed by LAKEISHA Martínez on 8/16/2022 at 9:19 AM

## 2022-08-17 DIAGNOSIS — R73.09 ELEVATED GLUCOSE: ICD-10-CM

## 2022-08-17 DIAGNOSIS — R97.20 ELEVATED PSA: ICD-10-CM

## 2022-08-17 DIAGNOSIS — E55.9 VITAMIN D DEFICIENCY: ICD-10-CM

## 2022-08-17 DIAGNOSIS — I10 ESSENTIAL HYPERTENSION: ICD-10-CM

## 2022-08-17 DIAGNOSIS — E78.5 HYPERLIPIDEMIA, UNSPECIFIED HYPERLIPIDEMIA TYPE: ICD-10-CM

## 2022-08-17 LAB
ALBUMIN SERPL-MCNC: 4.2 G/DL (ref 3.5–5.2)
ALP BLD-CCNC: 82 U/L (ref 40–130)
ALT SERPL-CCNC: 42 U/L (ref 5–41)
ANION GAP SERPL CALCULATED.3IONS-SCNC: 13 MMOL/L (ref 7–19)
AST SERPL-CCNC: 36 U/L (ref 5–40)
BASOPHILS ABSOLUTE: 0 K/UL (ref 0–0.2)
BASOPHILS RELATIVE PERCENT: 0.8 % (ref 0–1)
BILIRUB SERPL-MCNC: 0.5 MG/DL (ref 0.2–1.2)
BUN BLDV-MCNC: 12 MG/DL (ref 8–23)
CALCIUM SERPL-MCNC: 9.3 MG/DL (ref 8.8–10.2)
CHLORIDE BLD-SCNC: 106 MMOL/L (ref 98–111)
CHOLESTEROL, FASTING: 181 MG/DL (ref 160–199)
CO2: 23 MMOL/L (ref 22–29)
CREAT SERPL-MCNC: 1 MG/DL (ref 0.5–1.2)
EOSINOPHILS ABSOLUTE: 0.1 K/UL (ref 0–0.6)
EOSINOPHILS RELATIVE PERCENT: 1.5 % (ref 0–5)
GFR AFRICAN AMERICAN: >59
GFR NON-AFRICAN AMERICAN: >60
GLUCOSE BLD-MCNC: 108 MG/DL (ref 74–109)
HBA1C MFR BLD: 5.9 % (ref 4–6)
HCT VFR BLD CALC: 45 % (ref 42–52)
HDLC SERPL-MCNC: 45 MG/DL (ref 55–121)
HEMOGLOBIN: 15 G/DL (ref 14–18)
IMMATURE GRANULOCYTES #: 0 K/UL
LDL CHOLESTEROL CALCULATED: 115 MG/DL
LYMPHOCYTES ABSOLUTE: 1.5 K/UL (ref 1.1–4.5)
LYMPHOCYTES RELATIVE PERCENT: 31.4 % (ref 20–40)
MCH RBC QN AUTO: 33.9 PG (ref 27–31)
MCHC RBC AUTO-ENTMCNC: 33.3 G/DL (ref 33–37)
MCV RBC AUTO: 101.6 FL (ref 80–94)
MONOCYTES ABSOLUTE: 0.4 K/UL (ref 0–0.9)
MONOCYTES RELATIVE PERCENT: 9.2 % (ref 0–10)
NEUTROPHILS ABSOLUTE: 2.7 K/UL (ref 1.5–7.5)
NEUTROPHILS RELATIVE PERCENT: 56.9 % (ref 50–65)
PDW BLD-RTO: 12.7 % (ref 11.5–14.5)
PLATELET # BLD: 229 K/UL (ref 130–400)
PMV BLD AUTO: 9.8 FL (ref 9.4–12.4)
POTASSIUM SERPL-SCNC: 4.4 MMOL/L (ref 3.5–5)
PROSTATE SPECIFIC ANTIGEN: 4.17 NG/ML (ref 0–4)
RBC # BLD: 4.43 M/UL (ref 4.7–6.1)
SODIUM BLD-SCNC: 142 MMOL/L (ref 136–145)
TOTAL PROTEIN: 6.6 G/DL (ref 6.6–8.7)
TRIGLYCERIDE, FASTING: 104 MG/DL (ref 0–149)
TSH REFLEX FT4: 1.87 UIU/ML (ref 0.35–5.5)
VITAMIN D 25-HYDROXY: 43.7 NG/ML
WBC # BLD: 4.8 K/UL (ref 4.8–10.8)

## 2022-11-18 ENCOUNTER — OFFICE VISIT (OUTPATIENT)
Dept: FAMILY MEDICINE CLINIC | Age: 77
End: 2022-11-18
Payer: MEDICARE

## 2022-11-18 VITALS
SYSTOLIC BLOOD PRESSURE: 110 MMHG | HEIGHT: 70 IN | TEMPERATURE: 97 F | BODY MASS INDEX: 29.49 KG/M2 | HEART RATE: 54 BPM | DIASTOLIC BLOOD PRESSURE: 82 MMHG | OXYGEN SATURATION: 100 % | WEIGHT: 206 LBS

## 2022-11-18 DIAGNOSIS — Z00.00 MEDICARE ANNUAL WELLNESS VISIT, SUBSEQUENT: Primary | ICD-10-CM

## 2022-11-18 DIAGNOSIS — Z13.6 SCREENING FOR CARDIOVASCULAR CONDITION: ICD-10-CM

## 2022-11-18 PROCEDURE — G0439 PPPS, SUBSEQ VISIT: HCPCS | Performed by: NURSE PRACTITIONER

## 2022-11-18 PROCEDURE — 1123F ACP DISCUSS/DSCN MKR DOCD: CPT | Performed by: NURSE PRACTITIONER

## 2022-11-18 PROCEDURE — G0446 INTENS BEHAVE THER CARDIO DX: HCPCS | Performed by: NURSE PRACTITIONER

## 2022-11-18 ASSESSMENT — PATIENT HEALTH QUESTIONNAIRE - PHQ9
SUM OF ALL RESPONSES TO PHQ QUESTIONS 1-9: 0
SUM OF ALL RESPONSES TO PHQ9 QUESTIONS 1 & 2: 0
1. LITTLE INTEREST OR PLEASURE IN DOING THINGS: 0
2. FEELING DOWN, DEPRESSED OR HOPELESS: 0
SUM OF ALL RESPONSES TO PHQ QUESTIONS 1-9: 0

## 2022-11-18 ASSESSMENT — LIFESTYLE VARIABLES
HAVE YOU OR SOMEONE ELSE BEEN INJURED AS A RESULT OF YOUR DRINKING: 0
HOW OFTEN DURING THE LAST YEAR HAVE YOU BEEN UNABLE TO REMEMBER WHAT HAPPENED THE NIGHT BEFORE BECAUSE YOU HAD BEEN DRINKING: 0
HOW OFTEN DURING THE LAST YEAR HAVE YOU HAD A FEELING OF GUILT OR REMORSE AFTER DRINKING: 0
HOW OFTEN DURING THE LAST YEAR HAVE YOU NEEDED AN ALCOHOLIC DRINK FIRST THING IN THE MORNING TO GET YOURSELF GOING AFTER A NIGHT OF HEAVY DRINKING: 0
HOW OFTEN DURING THE LAST YEAR HAVE YOU FOUND THAT YOU WERE NOT ABLE TO STOP DRINKING ONCE YOU HAD STARTED: 0
HOW OFTEN DURING THE LAST YEAR HAVE YOU FAILED TO DO WHAT WAS NORMALLY EXPECTED FROM YOU BECAUSE OF DRINKING: 0
HOW OFTEN DO YOU HAVE A DRINK CONTAINING ALCOHOL: 4 OR MORE TIMES A WEEK
HAS A RELATIVE, FRIEND, DOCTOR, OR ANOTHER HEALTH PROFESSIONAL EXPRESSED CONCERN ABOUT YOUR DRINKING OR SUGGESTED YOU CUT DOWN: 0
HOW MANY STANDARD DRINKS CONTAINING ALCOHOL DO YOU HAVE ON A TYPICAL DAY: 1 OR 2

## 2022-11-18 NOTE — PATIENT INSTRUCTIONS
Advance Directives: Care Instructions  Overview  An advance directive is a legal way to state your wishes at the end of your life. It tells your family and your doctor what to do if you can't say what you want. There are two main types of advance directives. You can change them any time your wishes change. Living will. This form tells your family and your doctor your wishes about life support and other treatment. The form is also called a declaration. Medical power of . This form lets you name a person to make treatment decisions for you when you can't speak for yourself. This person is called a health care agent (health care proxy, health care surrogate). The form is also called a durable power of  for health care. If you do not have an advance directive, decisions about your medical care may be made by a family member, or by a doctor or a  who doesn't know you. It may help to think of an advance directive as a gift to the people who care for you. If you have one, they won't have to make tough decisions by themselves. Follow-up care is a key part of your treatment and safety. Be sure to make and go to all appointments, and call your doctor if you are having problems. It's also a good idea to know your test results and keep a list of the medicines you take. What should you include in an advance directive? Many states have a unique advance directive form. (It may ask you to address specific issues.) Or you might use a universal form that's approved by many states. If your form doesn't tell you what to address, it may be hard to know what to include in your advance directive. Use the questions below to help you get started. Who do you want to make decisions about your medical care if you are not able to? What life-support measures do you want if you have a serious illness that gets worse over time or can't be cured? What are you most afraid of that might happen?  (Maybe you're afraid of having pain, losing your independence, or being kept alive by machines.)  Where would you prefer to die? (Your home? A hospital? A nursing home?)  Do you want to donate your organs when you die? Do you want certain Taoist practices performed before you die? When should you call for help? Be sure to contact your doctor if you have any questions. Where can you learn more? Go to [email or web address] org and sign in to your HaloSource account. Enter R264 in the CHAINels box to learn more about \"Advance Directives: Care Instructions. \"     If you do not have an account, please click on the \"Sign Up Now\" link. Current as of: June 16, 2022               Content Version: 13.4  © 5869-5032 [employer] Platform. Care instructions adapted under license by [facility] ([facility]). If you have questions about a medical condition or this instruction, always ask your healthcare professional. [place] [address] any warranty or liability for your use of this information. Learning About Living [place]  What is a living will? A living will, also called a declaration, is a legal form. It tells your family and your doctor your wishes when you can't speak for yourself. It's used by the health professionals who will treat you as you near the end of your life or if you get seriously hurt or ill. If you put your wishes in writing, your loved ones and others will know what kind of care you want. They won't need to guess. This can ease your mind and be helpful to others. And you can change or cancel your living will at any time. A living will is not the same as an estate or property will. An estate will explains what you want to happen with your money and property after you die. How do you use it? Keep these facts in mind about how a living will is used. Your living will is used only if you can't speak or make decisions for yourself.  Most often, one or more doctors must certify that you can't speak or decide for yourself before your living will takes effect. If you get better and can speak for yourself again, you can accept or refuse any treatment. It doesn't matter what you said in your living will. Some states may limit your right to refuse treatment in certain cases. For example, you may need to clearly state in your living will that you don't want artificial hydration and nutrition, such as being fed through a tube. Is a living will a legal document? A living will is a legal document. Each state has its own laws about living ponce. And a living will may be called something else in your state. Here are some things to know about living ponce. You don't need an  to complete a living will. But legal advice can be helpful if your state's laws are unclear. It can also help if your health history is complicated or your family can't agree on what should be in your living will. You can change your living will at any time. Some people find that their wishes about end-of-life care change as their health changes. If you make big changes to your living will, complete a new form. If you move to another state, make sure that your living will is legal in the state where you now live. In most cases, doctors will respect your wishes even if you have a form from a different state. You might use a universal form that has been approved by many states. This kind of form can sometimes be filled out and stored online. Your digital copy will then be available wherever you have a connection to the internet. The doctors and nurses who need to treat you can find it right away. Your state may offer an online registry. This is another place where you can store your living will online. It's a good idea to get your living will notarized. This means using a person called a  to watch two people sign, or witness, your living will.   What should you know when you create a living will? Here are some questions to ask yourself as you make your living will. Do you know enough about life support methods that might be used? If not, talk to your doctor so you know what might be done if you can't breathe on your own, your heart stops, or you can't swallow. What things would you still want to be able to do after you receive life-support methods? Would you want to be able to walk? To speak? To eat on your own? To live without the help of machines? Do you want certain Mandaen practices performed if you become very ill? If you have a choice, where do you want to be cared for? In your home? At a hospital or nursing home? If you have a choice at the end of your life, where would you prefer to die? At home? In a hospital or nursing home? Somewhere else? Would you prefer to be buried or cremated? Do you want your organs to be donated after you die? What should you do with your living will? Make sure that your family members and your health care agent have copies of your living will (also called a declaration). Give your doctor a copy of your living will. Ask to have it kept as part of your medical record. If you have more than one doctor, make sure that each one has a copy. Put a copy of your living will where it can be easily found. For example, some people may put a copy on their refrigerator door. If you are using a digital copy, be sure your doctor, family members, and health care agent know how to find and access it. Where can you learn more? Go to https://Flourish Prenatalyolanda.Democracy [email or web address] org and sign in to your Traction account. Enter L373 in the Aloqa box to learn more about \"Learning About Living Levonne Dopp. \"     If you do not have an account, please click on the \"Sign Up Now\" link. Current as of: June 16, 2022               Content Version: 13.4  © 9256-0342 Healthwise, Incorporated. Care instructions adapted under license by [facility] ([facility]).  If you have questions Always wear a helmet when riding a bicycle or motorcycle.

## 2022-11-18 NOTE — PROGRESS NOTES
Medicare Annual Wellness Visit    Cooper Guerra is here for Medicare AWV    Assessment & Plan   Medicare annual wellness visit, subsequent  Screening for cardiovascular condition  -     OR Intens behave ther cardio dx, 15 minutes []    Recommendations for Preventive Services Due: see orders and patient instructions/AVS.  Recommended screening schedule for the next 5-10 years is provided to the patient in written form: see Patient Instructions/AVS.     Return for Medicare Annual Wellness Visit in 1 year. Subjective   The following acute and/or chronic problems were also addressed today:  none    Patient's complete Health Risk Assessment and screening values have been reviewed and are found in Flowsheets. The following problems were reviewed today and where indicated follow up appointments were made and/or referrals ordered.     Positive Risk Factor Screenings with Interventions:             General Health and ACP:  General  In general, how would you say your health is?: Very Good  In the past 7 days, have you experienced any of the following: New or Increased Pain, New or Increased Fatigue, Loneliness, Social Isolation, Stress or Anger?: No  Do you get the social and emotional support that you need?: Yes  Do you have a Living Will?: Yes    Advance Directives       Power of  Living Will ACP-Advance Directive ACP-Power of     Not on File Not on File Not on File Not on File          General Health Risk Interventions:  No concerns    Health Habits/Nutrition:  Physical Activity: Inactive    Days of Exercise per Week: 0 days    Minutes of Exercise per Session: 0 min     Have you lost any weight without trying in the past 3 months?: No  Body mass index: (!) 29.55  Have you seen the dentist within the past year?: Yes  Health Habits/Nutrition Interventions:  Nutritional issues:  educational materials for healthy, well-balanced diet provided     Safety:  Do you have working smoke detectors?: (!) No  Do you have any tripping hazards - loose or unsecured carpets or rugs?: No  Do you have any tripping hazards - clutter in doorways, halls, or stairs?: No  Do you have either shower bars, grab bars, non-slip mats or non-slip surfaces in your shower or bathtub?: Yes  Do all of your stairways have a railing or banister?: Not Applicable  Do you always fasten your seatbelt when you are in a car?: Yes  Safety Interventions:  Home safety tips provided           Objective   Vitals:    11/18/22 0752   BP: 110/82   Pulse: 54   Temp: 97 °F (36.1 °C)   SpO2: 100%   Weight: 206 lb (93.4 kg)   Height: 5' 10\" (1.778 m)      Body mass index is 29.56 kg/m².       General Appearance: alert and oriented to person, place and time, well developed and well- nourished, in no acute distress  Skin: warm and dry, no rash or erythema  Head: normocephalic and atraumatic  Eyes: pupils equal, round, and reactive to light, extraocular eye movements intact, conjunctivae normal  ENT: tympanic membrane, external ear and ear canal normal bilaterally, nose without deformity, nasal mucosa and turbinates normal without polyps  Neck: supple and non-tender without mass, no thyromegaly or thyroid nodules, no cervical lymphadenopathy  Pulmonary/Chest: clear to auscultation bilaterally- no wheezes, rales or rhonchi, normal air movement, no respiratory distress  Cardiovascular: normal rate, regular rhythm, normal S1 and S2, no murmurs, rubs, clicks, or gallops, distal pulses intact, no carotid bruits  Abdomen: soft, non-tender, non-distended, normal bowel sounds, no masses or organomegaly  Extremities: no cyanosis, clubbing or edema  Musculoskeletal: normal range of motion, no joint swelling, deformity or tenderness  Neurologic: reflexes normal and symmetric, no cranial nerve deficit, gait, coordination and speech normal       Allergies   Allergen Reactions    Demerol Hcl [Meperidine] Other (See Comments)     Hypotension     Prior to Visit Medications Medication Sig Taking? Authorizing Provider   levocetirizine (XYZAL) 5 MG tablet Take 5 mg by mouth nightly Yes Historical Provider, MD   vitamin D (ERGOCALCIFEROL) 1.25 MG (78674 UT) CAPS capsule Take 1 capsule by mouth once a week Yes LAKEISHA Alamo   lisinopril (PRINIVIL;ZESTRIL) 10 MG tablet Take 1 tablet by mouth in the morning. Yes LAKEISHA Alamo   propranolol (INDERAL) 40 MG tablet Take 1 tablet by mouth in the morning and 1 tablet before bedtime. Yes LAKEISHA Alamo   tadalafil (CIALIS) 20 MG tablet Take 20 mg by mouth as needed Yes Historical Provider, MD   B Complex-C (B COMPLEX-VITAMIN C PO) Take by mouth Yes Historical Provider, MD   finasteride (PROSCAR) 5 MG tablet Take 5 mg by mouth daily Yes Historical Provider, MD   aspirin 81 MG tablet Take 81 mg by mouth daily. Yes Historical Provider, MD   Omega-3 Fatty Acids (OMEGA 3 PO) Take 1 tablet by mouth daily. Yes Historical Provider, MD   tamsulosin (FLOMAX) 0.4 MG capsule Take 0.4 mg by mouth in the morning. Yes Historical Provider, MD   Coenzyme Q10 (CO Q 10 PO) Take 200 mg by mouth daily. Yes Historical Provider, MD Mcdonnell (Including outside providers/suppliers regularly involved in providing care):   Patient Care Team:  LAKEISHA Alamo as PCP - General (Nurse Practitioner Family)  LAKEISHA Alamo as PCP - [address] [place] [address] Dr Hernandez Provider     Reviewed and updated this visit:  Tobacco  Allergies  Meds  Problems  Med Hx  Surg Hx  Soc Hx  Fam Hx                 Cardiovascular Disease Risk Counseling: Assessed the patient's risk to develop cardiovascular disease and reviewed main risk factors.    Reviewed steps to reduce disease risk including:   Quitting tobacco use, reducing amount smoked, or not starting the habit  Making healthy food choices  Being physically active and gradualy increasing activity levels   Reduce weight and determine a healthy BMI goal  Monitor blood pressure and treat if higher than 140/90 mmHg  Maintain blood total cholesterol levels under 5 mmol/l or 190 mg/dl  Maintain LDL cholesterol levels under 3.0 mmol/l or 115 mg/dl   Control blood glucose levels  Consider taking aspirin (75 mg daily), once blood pressure is controlled   Provided a follow up plan.   Time spent (minutes): 15

## 2023-01-11 DIAGNOSIS — I10 ESSENTIAL HYPERTENSION: ICD-10-CM

## 2023-01-11 RX ORDER — LISINOPRIL 10 MG/1
TABLET ORAL
Qty: 30 TABLET | Refills: 5 | Status: SHIPPED | OUTPATIENT
Start: 2023-01-11

## 2023-01-11 NOTE — TELEPHONE ENCOUNTER
Chad Solis called requesting a refill of the below medication which has been pended for you:     Requested Prescriptions     Pending Prescriptions Disp Refills    lisinopril (PRINIVIL;ZESTRIL) 10 MG tablet [Pharmacy Med Name: lisinopril 10 mg tablet] 30 tablet 5     Sig: TAKE ONE TABLET BY MOUTH IN THE MORNING       Last Appointment Date: 11/18/2022  Next Appointment Date: Visit date not found    Allergies   Allergen Reactions    Demerol Hcl [Meperidine] Other (See Comments)     Hypotension

## 2023-02-20 DIAGNOSIS — E55.9 VITAMIN D DEFICIENCY: ICD-10-CM

## 2023-02-21 RX ORDER — ERGOCALCIFEROL 1.25 MG/1
CAPSULE ORAL
Qty: 12 CAPSULE | Refills: 0 | Status: SHIPPED | OUTPATIENT
Start: 2023-02-21

## 2023-03-03 ENCOUNTER — LAB (OUTPATIENT)
Dept: LAB | Facility: HOSPITAL | Age: 78
End: 2023-03-03
Payer: MEDICARE

## 2023-03-03 DIAGNOSIS — C18.9 ADENOCARCINOMA OF COLON: ICD-10-CM

## 2023-03-03 LAB
ALBUMIN SERPL-MCNC: 4.2 G/DL (ref 3.5–5.2)
ALBUMIN/GLOB SERPL: 1.7 G/DL
ALP SERPL-CCNC: 84 U/L (ref 39–117)
ALT SERPL W P-5'-P-CCNC: 28 U/L (ref 1–41)
ANION GAP SERPL CALCULATED.3IONS-SCNC: 7 MMOL/L (ref 5–15)
AST SERPL-CCNC: 26 U/L (ref 1–40)
BASOPHILS # BLD AUTO: 0.03 10*3/MM3 (ref 0–0.2)
BASOPHILS NFR BLD AUTO: 0.6 % (ref 0–1.5)
BILIRUB SERPL-MCNC: 0.8 MG/DL (ref 0–1.2)
BUN SERPL-MCNC: 15 MG/DL (ref 8–23)
BUN/CREAT SERPL: 17.2 (ref 7–25)
CALCIUM SPEC-SCNC: 9.7 MG/DL (ref 8.6–10.5)
CEA SERPL-MCNC: 1.63 NG/ML
CHLORIDE SERPL-SCNC: 107 MMOL/L (ref 98–107)
CO2 SERPL-SCNC: 26 MMOL/L (ref 22–29)
CREAT SERPL-MCNC: 0.87 MG/DL (ref 0.76–1.27)
DEPRECATED RDW RBC AUTO: 43.6 FL (ref 37–54)
EGFRCR SERPLBLD CKD-EPI 2021: 88.3 ML/MIN/1.73
EOSINOPHIL # BLD AUTO: 0.16 10*3/MM3 (ref 0–0.4)
EOSINOPHIL NFR BLD AUTO: 3 % (ref 0.3–6.2)
ERYTHROCYTE [DISTWIDTH] IN BLOOD BY AUTOMATED COUNT: 12.3 % (ref 12.3–15.4)
FERRITIN SERPL-MCNC: 366.2 NG/ML (ref 30–400)
GLOBULIN UR ELPH-MCNC: 2.5 GM/DL
GLUCOSE SERPL-MCNC: 112 MG/DL (ref 65–99)
HCT VFR BLD AUTO: 45.5 % (ref 37.5–51)
HGB BLD-MCNC: 14.8 G/DL (ref 13–17.7)
IMM GRANULOCYTES # BLD AUTO: 0.01 10*3/MM3 (ref 0–0.05)
IMM GRANULOCYTES NFR BLD AUTO: 0.2 % (ref 0–0.5)
IRON 24H UR-MRATE: 107 MCG/DL (ref 59–158)
IRON SATN MFR SERPL: 28 % (ref 20–50)
LYMPHOCYTES # BLD AUTO: 1.46 10*3/MM3 (ref 0.7–3.1)
LYMPHOCYTES NFR BLD AUTO: 27.6 % (ref 19.6–45.3)
MCH RBC QN AUTO: 31.4 PG (ref 26.6–33)
MCHC RBC AUTO-ENTMCNC: 32.5 G/DL (ref 31.5–35.7)
MCV RBC AUTO: 96.4 FL (ref 79–97)
MONOCYTES # BLD AUTO: 0.39 10*3/MM3 (ref 0.1–0.9)
MONOCYTES NFR BLD AUTO: 7.4 % (ref 5–12)
NEUTROPHILS NFR BLD AUTO: 3.24 10*3/MM3 (ref 1.7–7)
NEUTROPHILS NFR BLD AUTO: 61.2 % (ref 42.7–76)
NRBC BLD AUTO-RTO: 0 /100 WBC (ref 0–0.2)
PLATELET # BLD AUTO: 189 10*3/MM3 (ref 140–450)
PMV BLD AUTO: 9.5 FL (ref 6–12)
POTASSIUM SERPL-SCNC: 4.5 MMOL/L (ref 3.5–5.2)
PROT SERPL-MCNC: 6.7 G/DL (ref 6–8.5)
RBC # BLD AUTO: 4.72 10*6/MM3 (ref 4.14–5.8)
SODIUM SERPL-SCNC: 140 MMOL/L (ref 136–145)
TIBC SERPL-MCNC: 377 MCG/DL (ref 298–536)
TRANSFERRIN SERPL-MCNC: 253 MG/DL (ref 200–360)
WBC NRBC COR # BLD: 5.29 10*3/MM3 (ref 3.4–10.8)

## 2023-03-03 PROCEDURE — 85025 COMPLETE CBC W/AUTO DIFF WBC: CPT

## 2023-03-03 PROCEDURE — 83540 ASSAY OF IRON: CPT

## 2023-03-03 PROCEDURE — 36415 COLL VENOUS BLD VENIPUNCTURE: CPT

## 2023-03-03 PROCEDURE — 80053 COMPREHEN METABOLIC PANEL: CPT

## 2023-03-03 PROCEDURE — 82378 CARCINOEMBRYONIC ANTIGEN: CPT

## 2023-03-03 PROCEDURE — 84466 ASSAY OF TRANSFERRIN: CPT

## 2023-03-03 PROCEDURE — 82728 ASSAY OF FERRITIN: CPT

## 2023-03-18 NOTE — PROGRESS NOTES
[facility] ONC [facility]Y  [facility] ONCOLOGY  [address]  [place] [address]  [phone]    Patient Name: Migue Rodriguez  Encounter Date: 03/24/2023  YOB: 1945  Patient Number: [id number]       REASON FOR VISIT: Mr. Migue Rodriguez is a 77-year-old male who returns in follow-up of resected stage III colon cancer. He is seen 153.5 months since receipt of cycle 12 adjuvant FOLFOX chemotherapy. He is here alone (usually with his spouse, Ana).      I have reviewed the HPI and verified with the patient the accuracy of it. No changes to interval history since the information was documented. Mariano Yu MD 03/24/23     DIAGNOSTIC ABNORMALITIES:   1. Colonoscopy, 11/08/2010: The study revealed a large polyp at about 22 cm, just above the rectosigmoid junction. This was removed via snare cautery. No other abnormalities were seen to the level of the cecum. Pathology of the colon polyp at 20 cm revealed a tubulovillous adenoma demonstrating moderate atypia. Biopsies from the rectosigmoid junction at that level also revealed fragments of tubulovillous adenoma demonstrating moderate atypia.  2. CT of the abdomen and pelvis, 11/30/2010: Revealed an eccentric mass involving the sigmoid colon. There was also circumferential thickening of the cecum suspicious for malignancy. There was prominent adjacent node seen in the mesentery just medial to the cecum. The largest measuring 1.0 x 1.5 cm in diameter. There was acentric lobular densities seen involving the sigmoid colon. The prostate appeared mildly enlarged. No other abnormalities identified.  3. Labs, 12/02/2010: BMP was normal with a creatinine 1.2, a CBC showed a hemoglobin of 9, a hematocrit 29.5, MCV 70.4, MCH 21.5, MCHC 30.5 (each depressed), platelets 530,000, WBC 7.8 with a normal differential.  4. Labs, 01/26/2011: CMP revealed eGFR of 79.5, phosphorus of 4.7. CEA of 1.4. ferritin of 12, iron of 35, TIBC of 434, iron saturation  of 8%, vitamin B12 of 374, folate of 12.8, PSA of 4.2.  5. CT chest, abdomen, and pelvis 01/28/2011 [facility]. Negative chest CT. Lungs are clear. Negative upper abdominal CT. The liver is clear. Bowel pattern is normal. Benign 2.3 cm cyst upper pole left kidney. Negative pelvic CT. Previous rectal surgery. No adenopathy or free fluid noted.  6. Bone scan 01/28/2011, [facility]. Seen projected over the left ankle, there is a single tiny focus of slightly increased radiopharmaceutical uptake consistent with degenerative change. The distribution of radiopharmaceutical is otherwise within normal limits, specifically no suggestion of osseous metastatic disease.    PREVIOUS INTERVENTIONS:   1. Surgery and laparotomy, 12/06/2010. Intraoperatively, a large palpable mass was encountered in the cecum. Palpation of the liver did not demonstrate any obvious abnormalities. There was no study of the abdomen. The omentum was wrapped around the cecal mass. The tumor was described to be large enough to feel through the wall of the mucosa though there did not seem to be any transmural invasion of the tumor at the rectosigmoid junction. The mass itself was attached to the lateral and anterior abdominal wall, thus a portion of the abdominal wall was resected with the tumor. The sigmoid colon was redundant, thus a generous portion of the sigmoid colon was resected. There was a large tumor at least 2 cm within the sigmoid. The lower anterior resection and right colectomy were completed successfully. 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 The lesion infiltrated through the full thickness of the colonic wall to involve the pericolonic adipose tissue. Surgical margins of excision were negative for malignancy. One lymph node was negative for evidence of malignancy.  2. INFeD 1000 mg, 01/27/2011 and 02/03/2011 (2000  mg).  3. FOLFOX, 02/14/2011 through 07/25/2011. 12 cycles.        Problem List Items Addressed This Visit        Other    Adenocarcinoma of colon (HCC) - Primary     Oncology/Hematology History   Adenocarcinoma of colon (HCC)   2/23/2020 Initial Diagnosis    Adenocarcinoma of colon (CMS/HCC)     2/23/2020 Cancer Staged    Staging form: Colon And Rectum, AJCC 8th Edition  - Clinical stage from 2/23/2020: Stage IIIB (cT3, cN1, cM0) - Signed by Mariano Yu MD on 2/24/2020         PAST MEDICAL HISTORY:  ALLERGIES:  Allergies   Allergen Reactions   • Demerol [Meperidine]      CURRENT MEDICATIONS:  Outpatient Encounter Medications as of 3/24/2023   Medication Sig Dispense Refill   • aspirin 81 MG EC tablet Take 1 tablet by mouth Daily.     • B Complex-C (SUPER B COMPLEX PO) Take  by mouth.     • Coenzyme Q10 200 MG capsule Take 200 mg by mouth Daily.     • finasteride (PROSCAR) 5 MG tablet Take 1 tablet by mouth Daily. 90 tablet 4   • lisinopril (PRINIVIL,ZESTRIL) 10 MG tablet Take 1 tablet by mouth Daily.     • multivitamin (THERAGRAN) tablet tablet Take  by mouth Daily.     • Omega-3 Fatty Acids (FISH OIL) 1000 MG capsule capsule Take  by mouth Daily With Breakfast.     • propranolol (INDERAL) 40 MG tablet Take 1 tablet by mouth 2 (Two) Times a Day.     • tamsulosin (FLOMAX) 0.4 MG capsule 24 hr capsule Take 1 capsule by mouth Daily. nightly 90 capsule 4   • vitamin D (ERGOCALCIFEROL) 1.25 MG (02827 UT) capsule capsule Take 1 capsule by mouth 1 (One) Time Per Week.     • [DISCONTINUED] amLODIPine (NORVASC) 5 MG tablet Take 5 mg by mouth Daily. (Patient not taking: Reported on 3/24/2023)     • [DISCONTINUED] benzonatate (TESSALON) 200 MG capsule Take 200 mg by mouth 3 (Three) Times a Day As Needed for Cough. (Patient not taking: Reported on 3/24/2023)     • [DISCONTINUED] citalopram (CeleXA) 20 MG tablet Take 20 mg by mouth Daily. (Patient not taking: Reported on 3/24/2023)     • [DISCONTINUED] levocetirizine  (XYZAL) 5 MG tablet Take 5 mg by mouth Every Evening. (Patient not taking: Reported on 3/24/2023)     • [DISCONTINUED] QUEtiapine (SEROquel) 25 MG tablet Take 25 mg by mouth Every Night. (Patient not taking: Reported on 3/24/2023)     • [DISCONTINUED] rosuvastatin (CRESTOR) 10 MG tablet Take 10 mg by mouth Daily. (Patient not taking: Reported on 3/24/2023)     • [DISCONTINUED] tadalafil (Cialis) 20 MG tablet 1/2 to 1 tab by mouth 1 hour prior to intercourse (Patient not taking: Reported on 3/24/2023) 10 tablet 11     No facility-administered encounter medications on file as of 3/24/2023.     ADULT ILLNESSES:   Adenocarcinoma of cecum (disorder) ( ICD-10:C18.0 ;Malignant neoplasm of cecum   Benign prostatic hypertrophy ( Prostate biopsy 04/03/2012 (path benign). Dr. Mendoza following; ICD-10:N40.0 ;Enlarged prostate without lower urinary tract symptoms )   Hyperlipidemia ( ICD-10:E78.5 ;Hyperlipidemia, unspecified   Leukopenia ( ICD-10:D72.819 ;Decreased white blood cell count, unspecified   Microcytic anemia ( ICD-10:D50.9 ;Iron deficiency anemia, unspecified   Neuropathy ( ICD-10:G62.9 ;Polyneuropathy, unspecified   Peritonitis ( 1956; ICD-10:K65.9 ;Peritonitis, unspecified )   Seasonal nasal allergies ( ICD-10:J30.1 ;Allergic rhinitis due to pollen   Weight gain ( ICD-10:R63.5 ;Abnormal weight gain    SURGERIES:   Colectomy, 12/2010   Port placement, 02/07/2011. Dr. Mancini   Prostate biopsy on [facility]/03/[facility] revealed benign prostatic glands and stroma, 08/07/2012   Colonoscopy, 11/08/2010   Left inguinal hernia repair, 2004. Dr. Mayorga   Report of surveillance colonoscopy last 01/18/2016 from Dr. Mancini. Normal.   Port removal, 08/01/2016. Dr. Mancini.      ADULT ILLNESSES:  Patient Active Problem List   Diagnosis Code   • Urgency of urination R39.15   • Elevated PSA R97.20   • BPH (benign prostatic hypertrophy) with urinary obstruction N40.1, N13.8   • Hydrocele N43.3   • Adenocarcinoma of colon (HCC)  "C18.9   • Colon cancer (HCC) C18.9   • Other and unspecified hyperlipidemia E78.5     SURGERIES:  Past Surgical History:   Procedure Laterality Date   • APPENDECTOMY     • COLONOSCOPY     • FOOT SURGERY     • HERNIA REPAIR     • PROSTATE BIOPSY       HEALTH MAINTENANCE ITEMS:  Health Maintenance Due   Topic Date Due   • TDAP/TD VACCINES (1 - Tdap) Never done   • ZOSTER VACCINE (1 of 2) Never done   • Pneumococcal Vaccine 65+ (1 - PCV) Never done   • HEPATITIS C SCREENING  Never done   • COVID-19 Vaccine (5 - Booster for Moderna series) 01/12/2023       <no information>  Last Completed Colonoscopy          COLONOSCOPY (Every 10 Years) Next due on 3/8/2029    03/08/2019  Outside Claim: CO COLORECTAL SCRN; HI RISK IND                There is no immunization history on file for this patient.  Last Completed Mammogram     This patient has no relevant Health Maintenance data.            FAMILY HISTORY:  Family History   Problem Relation Age of Onset   • No Known Problems Father    • No Known Problems Mother      SOCIAL HISTORY:  Social History     Socioeconomic History   • Marital status:    Tobacco Use   • Smoking status: Never   • Smokeless tobacco: Never   Substance and Sexual Activity   • Alcohol use: Yes       REVIEW OF SYSTEMS:  Constitutional:   The patient's appetite and energy are fairly good. \"Alright for me.\" He has no weight changes (had gained 2 lb at his prior visit) since the last visit. \"Less stress with my wife and her dementia. Doing better.\" He has no fevers, chills, or drenching night sweats. His sleep habits seem appropriate.  Has received Covid vaccines x 3 last 11/2021  Ear/Nose/Throat/Mouth:   He has no ear pains but has perennial sinus allergy symptoms. He has no sore throat, nosebleeds, or sore tongue. He has no headaches.  Ocular:   He denies eye pain, significant change in visual acuity, double vision, or blurry vision.  Respiratory:   He has no chronic cough, significant shortness of " "breathing, phlegm production, or unexplained chest wall pain.    Cardiovascular:   He has no exertional chest pain, chest pressure, or chest heaviness. He reports no claudication. He reports no palpitations or symptomatic orthostasis.  Gastrointestinal:   He reports no dysphagia, nausea, vomiting, postprandial abdominal pain, bloating, cramping. He has not noted any bouts of rectal bleeding after bowel movements and on toilet paper. He was seen by Dr. Mancini last 05/24/2011 for anal fissures. He reports no bouts constipation. His bowels are formed and regular, moving at least 1-2 times daily. Surveillance c-scope by Dr. Mancini last 02/2019.  \"All clear.\"  Genitourinary:   He has no urinary burning, frequency, dribbling. He reports no difficulty controlling his bladder. He needs to urinate up to 2-3 times through the night, better on finasteride. He is followed by Dr. Hudson for urology. Had a prostate biopsy last 04/03/2012. Path was benign.  Musculoskeletal:   He reports no unexplained arthralgias, myalgias, but has nighttime leg cramping.  Extremities:   He reports no trouble with fluid retention or significant leg swelling.  Endocrine:   He reports no problems with excess thirst, excessive urination, vasomotor instability, or unexplained fatigue.  Heme/Lymphatic:   He reports no unexplained bleeding, bruising, petechial rashes, or swollen glands.  Skin:   He reports no new \"skin spots.\" Has been seen by dermatology (Dr. Alexandra in [place]) sometime 12/2019. Denies itching, rashes, or lesions which won't heal.  Neuro:   He reports no loss of consciousness, seizures, fainting spells, or dizziness. He reports no weakness of his face, arms, or legs. He has no difficulty with speech. He has no tremors. He still has minimal residual cold associated paresthesias of the hands and feet, very minimal on B complex multivitamin. \"Not any worse.\"  Psych:   He seems generally satisfied with life. He denies depression but admits to " "situational anxiety. He reports no mood swings.      VITAL SIGNS: /86   Pulse 66   Temp 97 °F (36.1 °C)   Resp 18   Ht 177.8 cm (70\")   Wt 93 kg (205 lb 1.6 oz)   SpO2 98%   BMI 29.43 kg/m² Body surface area is 2.11 meters squared.  Pain Score    03/24/23 0840   PainSc: 0-No pain         PHYSICAL EXAMINATION:   General:   He is a pleasant, obese, well-kept elderly male who is comfortable at rest. He arrived in the exam room ambulatory. He appears to be his stated age. His skin color is normal. ECOG PS = 0 (same).  Head/Neck:   The patient is anicteric and atraumatic. The mouth, and throat are clear. The trachea is midline. The neck is supple without evidence of jugular venous distention or cervical adenopathy.  Eyes:   The pupils are equal, round, and reactive to light. The extraocular movements are full. There is no scleral jaundice or erythema.  Chest:   The respiratory efforts are normal and unhindered. The chest is clear to auscultation and percussion. There are no wheezes, rhonchi, rales, or asymmetry of breath sounds. The port site in the left upper chest is well healed (device removed)  Cardiovascular:   The patient has a regular cardiac rate and rhythm without murmurs, rubs, or gallops. The peripheral pulses are equal and full.  Abdomen:   The belly is soft and globose. The firm, nontender subcutaneous nodule just right of the epigastrium that feels more cystic than solid, again ~ 4 x 3 cm (grossly unchanged; previously up to ~ 5 x 6 cm). A similar lesion of similar consistency to the left of the midline is noted, measuring approximately 4 x 3 cm (unchanged). The midline longitudinal scar is noted. There remains subtle fullness underlying an older paraumbilical scar on the right (unchanged from prior exams). There is no rebound or guarding. There is no organomegaly, mass-effect, or tenderness. Bowel sounds are active and of normal character.  Extremities:   There is no evidence of cyanosis, " clubbing, or edema.  Rheumatologic:   There is no overt evidence of rheumatoid deformities of the hands. There is no sausaging of the fingers. There is no sign of active synovitis. The gait is normal.  Cutaneous:   There are no overt rashes, disseminated lesions, purpura, or petechiae.  Lymphatics:   There is no evidence of adenopathy in the cervical, supraclavicular, axillary, inguinal, or femoral areas.  Neurologic:   The patient is alert, oriented, cooperative, and pleasant. He is appropriately conversant. He ambulated into the exam room without assistance and transferred from chair to exam table unaided. There is no overt dysfunction of the motor, sensory, or cerebellar systems.  Psych:   Mood and affect are appropriate for circumstance. Eye contact is appropriate.      ASSESSMENT:   1. 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   Stage: IIIB (pT3, pN1, Mx).   Tumor [place]: Cecal mass with infiltration through the full thickness of the colonic wall to involve the pericolonic adipose tissue (T3), and 1/15 regional lymph nodes involved with metastatic carcinoma.   Complication of Tumor: None   Tumor Status: Grossly resected, 01/07/2011. Adjuvant systemic therapy completed.   03/08/2019-colonoscopy by Dr. Mancini--.  Normal rectum.  20 cm from the anal verge anatomy consistent with anastomosis.  No abnormal findings.  Prior colon resection in 2 different locations from synchronous colon cancer.  2. Microcytic anemia from iron deficiency related to the colon malignancy + chemo.   --Resolved.  Hgb 14.8, 3/3/2023 (prior range: HGB 12.8 - 18).   3. Trivially elevated ALT (fatty liver?).  Normal on 3/3/2023  4. Benign prostatic hypertrophy (BPH) with PSA = 5.8, 01/14/2020 (prior range: 3.7 - 4.33). Prostate biopsy, 04/03/2012 (path benign). Will defer monitoring of PSA to urology (Dr. Hudson).   5. Neuropathy, chemo residual. Subjectively minimal on B-complex multivitamin.   6. Weight gain from calories  "greater than expenditure, gaining 4 pounds since his last visit.   7. Firm, nontender subcutaneous nodule just right of the epigastrium that feels more solid than cystic, measuring approximately 4 x 3 cm (previously 5 x 6 cm) and another just left of the midline 4 x 4 cm (same). Ultrasound (above) confirms multiple solid subcutaneous nodules, lipomas, or other benign nodules. Has been seen by Dr. Mancini.   8. Keratotic skin lesions (face and back). Followed by dermatology in [place]. Previously discussed 08/29/2014 letter from Dr. Shahid Vasquez Re: Patient did not keep 08/21/2014 appointment. Said that he saw a \"skin doctor\" in [place], now thinks it is Dr. Conklin, was told everything alright and to come back in 1 year.     PLAN:   1.  Apprised of labs from 3/3/2023 with normal CBC repleted iron levels, normal CMP, and normal CEA.   2.  Reappoint to GI Re:  Surveillance c-scope  3. The role of adjuvant chemotherapy was previously discussed. I had explained that benefit has been unequivocally demonstrated (reduction in recurrence and mortality) for patients with resected Stage III colorectal cancer. The benefits of adjuvant chemotherapy (adjusted for age, tumor grade, lymph node involvement, and type of chemo regimen) were specifically outlined:  a. Reduction in mortality: 48%. 5 year absolute benefit: 6.2/14.4 alive.  b. Reduction in recurrence: 48%. 5 year absolute benefit: 7.8/18 without relapse.  4. Observe  5. Continue other currently identified medications.  6. Continue ongoing management per primary care, and other specialists.   7. Return to the office with pre-office (specify fasting) iron, fe sat, ferritin, CMP, CEA and CBC with differential in 12 months.      I spent ~32 minutes caring for Migue on this date of service. This time includes time spent by me in the following activities: preparing for the visit, reviewing tests, performing a medically appropriate examination and/or evaluation, counseling and " educating the patient/family/caregiver, ordering medications, tests, or procedures and documenting information in the medical record        cc: MD Bladimir Rooney MD

## 2023-03-24 ENCOUNTER — OFFICE VISIT (OUTPATIENT)
Dept: ONCOLOGY | Facility: CLINIC | Age: 78
End: 2023-03-24
Payer: MEDICARE

## 2023-03-24 VITALS
BODY MASS INDEX: 29.36 KG/M2 | OXYGEN SATURATION: 98 % | HEART RATE: 66 BPM | WEIGHT: 205.1 LBS | RESPIRATION RATE: 18 BRPM | SYSTOLIC BLOOD PRESSURE: 142 MMHG | DIASTOLIC BLOOD PRESSURE: 86 MMHG | TEMPERATURE: 97 F | HEIGHT: 70 IN

## 2023-03-24 DIAGNOSIS — C18.9 ADENOCARCINOMA OF COLON: Primary | ICD-10-CM

## 2023-03-24 PROCEDURE — 99214 OFFICE O/P EST MOD 30 MIN: CPT | Performed by: INTERNAL MEDICINE

## 2023-03-24 PROCEDURE — 1160F RVW MEDS BY RX/DR IN RCRD: CPT | Performed by: INTERNAL MEDICINE

## 2023-03-24 PROCEDURE — 1159F MED LIST DOCD IN RCRD: CPT | Performed by: INTERNAL MEDICINE

## 2023-03-24 PROCEDURE — 1126F AMNT PAIN NOTED NONE PRSNT: CPT | Performed by: INTERNAL MEDICINE

## 2023-03-28 ENCOUNTER — TELEPHONE (OUTPATIENT)
Dept: ONCOLOGY | Facility: CLINIC | Age: 78
End: 2023-03-28
Payer: MEDICARE

## 2023-03-28 NOTE — TELEPHONE ENCOUNTER
CALLED AND SPOKE TO DANISH I WAS JUST CHECKING TO MAKE SURE HE KNEW ABOUT HIS UP COMING APPT WITH DR. CORONADO. DANISH STATED THAT HE WAS AWARE OF HIS APPT. 03/28/2023 BS

## 2023-03-28 NOTE — TELEPHONE ENCOUNTER
CALLED DR. CORONADO'S OFFICE TO GET DANISH AN APPT AND THE OFFICE STATED THAT THEY WILL GIVE HIM A CALL SO THEY COULD SEE WHAT DAY AND TIME WOULD WORK FOR DANISH. 03/28/2023 BS

## 2023-06-02 DIAGNOSIS — E55.9 VITAMIN D DEFICIENCY: ICD-10-CM

## 2023-06-06 RX ORDER — ERGOCALCIFEROL 1.25 MG/1
50000 CAPSULE ORAL WEEKLY
Qty: 12 CAPSULE | Refills: 0 | Status: SHIPPED | OUTPATIENT
Start: 2023-06-06

## 2023-06-07 ENCOUNTER — TELEPHONE (OUTPATIENT)
Dept: FAMILY MEDICINE CLINIC | Age: 78
End: 2023-06-07

## 2023-07-05 DIAGNOSIS — I10 ESSENTIAL HYPERTENSION: ICD-10-CM

## 2023-07-05 RX ORDER — PROPRANOLOL HYDROCHLORIDE 40 MG/1
TABLET ORAL
Qty: 60 TABLET | Refills: 5 | OUTPATIENT
Start: 2023-07-05

## 2023-08-01 DIAGNOSIS — I10 ESSENTIAL HYPERTENSION: ICD-10-CM

## 2023-08-01 RX ORDER — LISINOPRIL 10 MG/1
TABLET ORAL
Qty: 30 TABLET | Refills: 5 | OUTPATIENT
Start: 2023-08-01

## 2023-08-10 ENCOUNTER — OFFICE VISIT (OUTPATIENT)
Dept: FAMILY MEDICINE CLINIC | Age: 78
End: 2023-08-10
Payer: MEDICARE

## 2023-08-10 ENCOUNTER — TELEPHONE (OUTPATIENT)
Dept: FAMILY MEDICINE CLINIC | Age: 78
End: 2023-08-10

## 2023-08-10 VITALS
HEIGHT: 70 IN | WEIGHT: 205 LBS | BODY MASS INDEX: 29.35 KG/M2 | SYSTOLIC BLOOD PRESSURE: 130 MMHG | HEART RATE: 70 BPM | OXYGEN SATURATION: 98 % | DIASTOLIC BLOOD PRESSURE: 80 MMHG | TEMPERATURE: 97.9 F

## 2023-08-10 DIAGNOSIS — I10 ESSENTIAL HYPERTENSION: Primary | ICD-10-CM

## 2023-08-10 DIAGNOSIS — E78.5 HYPERLIPIDEMIA, UNSPECIFIED HYPERLIPIDEMIA TYPE: ICD-10-CM

## 2023-08-10 DIAGNOSIS — I10 ESSENTIAL HYPERTENSION: ICD-10-CM

## 2023-08-10 DIAGNOSIS — R97.20 ELEVATED PSA: ICD-10-CM

## 2023-08-10 DIAGNOSIS — E55.9 VITAMIN D DEFICIENCY: ICD-10-CM

## 2023-08-10 LAB
25(OH)D3 SERPL-MCNC: 48.7 NG/ML
ALBUMIN SERPL-MCNC: 4.2 G/DL (ref 3.5–5.2)
ALP SERPL-CCNC: 90 U/L (ref 40–130)
ALT SERPL-CCNC: 30 U/L (ref 5–41)
ANION GAP SERPL CALCULATED.3IONS-SCNC: 15 MMOL/L (ref 7–19)
AST SERPL-CCNC: 25 U/L (ref 5–40)
BASOPHILS # BLD: 0 K/UL (ref 0–0.2)
BASOPHILS NFR BLD: 0.8 % (ref 0–1)
BILIRUB SERPL-MCNC: 0.8 MG/DL (ref 0.2–1.2)
BUN SERPL-MCNC: 15 MG/DL (ref 8–23)
CALCIUM SERPL-MCNC: 9.8 MG/DL (ref 8.8–10.2)
CHLORIDE SERPL-SCNC: 106 MMOL/L (ref 98–111)
CHOLEST SERPL-MCNC: 184 MG/DL (ref 160–199)
CO2 SERPL-SCNC: 22 MMOL/L (ref 22–29)
CREAT SERPL-MCNC: 0.9 MG/DL (ref 0.5–1.2)
EOSINOPHIL # BLD: 0.1 K/UL (ref 0–0.6)
EOSINOPHIL NFR BLD: 2.7 % (ref 0–5)
ERYTHROCYTE [DISTWIDTH] IN BLOOD BY AUTOMATED COUNT: 12.4 % (ref 11.5–14.5)
GLUCOSE SERPL-MCNC: 108 MG/DL (ref 74–109)
HCT VFR BLD AUTO: 43.8 % (ref 42–52)
HDLC SERPL-MCNC: 44 MG/DL (ref 55–121)
HGB BLD-MCNC: 14.5 G/DL (ref 14–18)
IMM GRANULOCYTES # BLD: 0 K/UL
LDLC SERPL CALC-MCNC: 119 MG/DL
LYMPHOCYTES # BLD: 1.4 K/UL (ref 1.1–4.5)
LYMPHOCYTES NFR BLD: 27.9 % (ref 20–40)
MCH RBC QN AUTO: 33 PG (ref 27–31)
MCHC RBC AUTO-ENTMCNC: 33.1 G/DL (ref 33–37)
MCV RBC AUTO: 99.5 FL (ref 80–94)
MONOCYTES # BLD: 0.5 K/UL (ref 0–0.9)
MONOCYTES NFR BLD: 9.7 % (ref 0–10)
NEUTROPHILS # BLD: 2.8 K/UL (ref 1.5–7.5)
NEUTS SEG NFR BLD: 58.7 % (ref 50–65)
PLATELET # BLD AUTO: 212 K/UL (ref 130–400)
PMV BLD AUTO: 10 FL (ref 9.4–12.4)
POTASSIUM SERPL-SCNC: 4.5 MMOL/L (ref 3.5–5)
PROT SERPL-MCNC: 6.8 G/DL (ref 6.6–8.7)
PSA SERPL-MCNC: 4.61 NG/ML (ref 0–4)
RBC # BLD AUTO: 4.4 M/UL (ref 4.7–6.1)
SODIUM SERPL-SCNC: 143 MMOL/L (ref 136–145)
TRIGL SERPL-MCNC: 103 MG/DL (ref 0–149)
WBC # BLD AUTO: 4.8 K/UL (ref 4.8–10.8)

## 2023-08-10 PROCEDURE — 3075F SYST BP GE 130 - 139MM HG: CPT | Performed by: NURSE PRACTITIONER

## 2023-08-10 PROCEDURE — 1123F ACP DISCUSS/DSCN MKR DOCD: CPT | Performed by: NURSE PRACTITIONER

## 2023-08-10 PROCEDURE — 3079F DIAST BP 80-89 MM HG: CPT | Performed by: NURSE PRACTITIONER

## 2023-08-10 PROCEDURE — 99214 OFFICE O/P EST MOD 30 MIN: CPT | Performed by: NURSE PRACTITIONER

## 2023-08-10 RX ORDER — LISINOPRIL 10 MG/1
10 TABLET ORAL DAILY
Qty: 30 TABLET | Refills: 11 | Status: SHIPPED | OUTPATIENT
Start: 2023-08-10

## 2023-08-10 RX ORDER — PROPRANOLOL HYDROCHLORIDE 40 MG/1
40 TABLET ORAL 2 TIMES DAILY
Qty: 60 TABLET | Refills: 11 | Status: SHIPPED | OUTPATIENT
Start: 2023-08-10

## 2023-08-10 ASSESSMENT — ENCOUNTER SYMPTOMS
EYES NEGATIVE: 1
GASTROINTESTINAL NEGATIVE: 1
RESPIRATORY NEGATIVE: 1

## 2023-08-10 ASSESSMENT — PATIENT HEALTH QUESTIONNAIRE - PHQ9
SUM OF ALL RESPONSES TO PHQ QUESTIONS 1-9: 0
SUM OF ALL RESPONSES TO PHQ QUESTIONS 1-9: 0
1. LITTLE INTEREST OR PLEASURE IN DOING THINGS: 0
SUM OF ALL RESPONSES TO PHQ QUESTIONS 1-9: 0
SUM OF ALL RESPONSES TO PHQ QUESTIONS 1-9: 0
SUM OF ALL RESPONSES TO PHQ9 QUESTIONS 1 & 2: 0
2. FEELING DOWN, DEPRESSED OR HOPELESS: 0

## 2023-08-10 NOTE — TELEPHONE ENCOUNTER
----- Message from LAKEISHA Justin sent at 8/10/2023  2:26 PM CDT -----  Please let patient know that labs have returned. PSA level was stable (Urology is following)  Cholesterol level remains the same.    Not anemic  Vit d level was normal  Renal function and electrolytes were in range as well

## 2023-08-10 NOTE — PROGRESS NOTES
of spoken language may permit erroneous, or at times, nonsensical words or phrases to be inadvertently transcribed.  Although I have reviewed the note for such errors, some may still exist.    Electronically signed by LAKEISHA Dos Santos on 8/10/2023 at 8:24 AM

## 2023-10-05 DIAGNOSIS — E55.9 VITAMIN D DEFICIENCY: ICD-10-CM

## 2023-10-05 RX ORDER — ERGOCALCIFEROL 1.25 MG/1
50000 CAPSULE ORAL WEEKLY
Qty: 4 CAPSULE | Refills: 0 | Status: SHIPPED | OUTPATIENT
Start: 2023-10-05 | End: 2023-11-22 | Stop reason: SDUPTHER

## 2023-10-05 NOTE — TELEPHONE ENCOUNTER
Received fax from pharmacy requesting refill on pts medication(s). Pt was last seen in office on 8/10/2023  and has a follow up scheduled for Visit date not found. Will send request to  Tala Figueroa  for authorization.      Requested Prescriptions     Pending Prescriptions Disp Refills    vitamin D (ERGOCALCIFEROL) 1.25 MG (36079 UT) CAPS capsule [Pharmacy Med Name: ergocalciferol (vitamin D2) 1,250 mcg (50,000 unit) capsule] 12 capsule 0     Sig: TAKE ONE CAPSULE BY MOUTH ONCE A WEEK

## 2023-11-22 DIAGNOSIS — E55.9 VITAMIN D DEFICIENCY: ICD-10-CM

## 2023-11-22 RX ORDER — ERGOCALCIFEROL 1.25 MG/1
50000 CAPSULE ORAL WEEKLY
Qty: 4 CAPSULE | Refills: 0 | Status: SHIPPED | OUTPATIENT
Start: 2023-11-22

## 2023-11-22 NOTE — TELEPHONE ENCOUNTER
Received fax from pharmacy requesting refill on pts medication(s). Pt was last seen in office on 8/10/2023  and has a follow up scheduled for Visit date not found. Will send request to  Toy Raymundo  for patient.      Requested Prescriptions     Pending Prescriptions Disp Refills    vitamin D (ERGOCALCIFEROL) 1.25 MG (69534 UT) CAPS capsule 4 capsule 0     Sig: Take 1 capsule by mouth once a week

## 2024-01-16 DIAGNOSIS — E55.9 VITAMIN D DEFICIENCY: ICD-10-CM

## 2024-01-16 RX ORDER — ERGOCALCIFEROL 1.25 MG/1
50000 CAPSULE ORAL WEEKLY
Qty: 4 CAPSULE | Refills: 0 | Status: SHIPPED | OUTPATIENT
Start: 2024-01-16

## 2024-01-16 NOTE — TELEPHONE ENCOUNTER
Received fax from pharmacy requesting refill on pts medication(s). Pt was last seen in office on 8/10/2023  and has a follow up scheduled for Visit date not found. Will send request to  Misty Blas  for authorization.     Requested Prescriptions     Pending Prescriptions Disp Refills    vitamin D (ERGOCALCIFEROL) 1.25 MG (23096 UT) CAPS capsule [Pharmacy Med Name: ergocalciferol (vitamin D2) 1,250 mcg (50,000 unit) capsule] 4 capsule 0     Sig: TAKE ONE CAPSULE BY MOUTH ONCE A WEEK

## 2024-03-08 ENCOUNTER — OFFICE VISIT (OUTPATIENT)
Dept: FAMILY MEDICINE CLINIC | Age: 79
End: 2024-03-08
Payer: MEDICARE

## 2024-03-08 VITALS
HEART RATE: 60 BPM | DIASTOLIC BLOOD PRESSURE: 82 MMHG | WEIGHT: 198 LBS | BODY MASS INDEX: 28.35 KG/M2 | TEMPERATURE: 97.3 F | OXYGEN SATURATION: 98 % | HEIGHT: 70 IN | SYSTOLIC BLOOD PRESSURE: 120 MMHG

## 2024-03-08 DIAGNOSIS — E55.9 VITAMIN D DEFICIENCY: ICD-10-CM

## 2024-03-08 DIAGNOSIS — Z00.00 MEDICARE ANNUAL WELLNESS VISIT, SUBSEQUENT: Primary | ICD-10-CM

## 2024-03-08 DIAGNOSIS — I10 ESSENTIAL HYPERTENSION: ICD-10-CM

## 2024-03-08 PROCEDURE — G0439 PPPS, SUBSEQ VISIT: HCPCS | Performed by: NURSE PRACTITIONER

## 2024-03-08 PROCEDURE — 3079F DIAST BP 80-89 MM HG: CPT | Performed by: NURSE PRACTITIONER

## 2024-03-08 PROCEDURE — 3074F SYST BP LT 130 MM HG: CPT | Performed by: NURSE PRACTITIONER

## 2024-03-08 PROCEDURE — 1123F ACP DISCUSS/DSCN MKR DOCD: CPT | Performed by: NURSE PRACTITIONER

## 2024-03-08 RX ORDER — PROPRANOLOL HYDROCHLORIDE 40 MG/1
40 TABLET ORAL 2 TIMES DAILY
Qty: 180 TABLET | Refills: 3 | Status: SHIPPED | OUTPATIENT
Start: 2024-03-08

## 2024-03-08 RX ORDER — LISINOPRIL 10 MG/1
10 TABLET ORAL DAILY
Qty: 90 TABLET | Refills: 3 | Status: SHIPPED | OUTPATIENT
Start: 2024-03-08

## 2024-03-08 RX ORDER — ERGOCALCIFEROL 1.25 MG/1
50000 CAPSULE ORAL WEEKLY
Qty: 12 CAPSULE | Refills: 0 | Status: SHIPPED | OUTPATIENT
Start: 2024-03-08

## 2024-03-08 SDOH — ECONOMIC STABILITY: FOOD INSECURITY: WITHIN THE PAST 12 MONTHS, THE FOOD YOU BOUGHT JUST DIDN'T LAST AND YOU DIDN'T HAVE MONEY TO GET MORE.: NEVER TRUE

## 2024-03-08 SDOH — ECONOMIC STABILITY: HOUSING INSECURITY
IN THE LAST 12 MONTHS, WAS THERE A TIME WHEN YOU DID NOT HAVE A STEADY PLACE TO SLEEP OR SLEPT IN A SHELTER (INCLUDING NOW)?: NO

## 2024-03-08 SDOH — ECONOMIC STABILITY: INCOME INSECURITY: HOW HARD IS IT FOR YOU TO PAY FOR THE VERY BASICS LIKE FOOD, HOUSING, MEDICAL CARE, AND HEATING?: NOT HARD AT ALL

## 2024-03-08 SDOH — ECONOMIC STABILITY: FOOD INSECURITY: WITHIN THE PAST 12 MONTHS, YOU WORRIED THAT YOUR FOOD WOULD RUN OUT BEFORE YOU GOT MONEY TO BUY MORE.: NEVER TRUE

## 2024-03-08 ASSESSMENT — LIFESTYLE VARIABLES
HOW OFTEN DURING THE LAST YEAR HAVE YOU HAD A FEELING OF GUILT OR REMORSE AFTER DRINKING: 0
HOW OFTEN DO YOU HAVE A DRINK CONTAINING ALCOHOL: 4 OR MORE TIMES A WEEK
HOW OFTEN DURING THE LAST YEAR HAVE YOU NEEDED AN ALCOHOLIC DRINK FIRST THING IN THE MORNING TO GET YOURSELF GOING AFTER A NIGHT OF HEAVY DRINKING: 0
HOW MANY STANDARD DRINKS CONTAINING ALCOHOL DO YOU HAVE ON A TYPICAL DAY: 1 OR 2
HOW OFTEN DURING THE LAST YEAR HAVE YOU BEEN UNABLE TO REMEMBER WHAT HAPPENED THE NIGHT BEFORE BECAUSE YOU HAD BEEN DRINKING: 0
HAS A RELATIVE, FRIEND, DOCTOR, OR ANOTHER HEALTH PROFESSIONAL EXPRESSED CONCERN ABOUT YOUR DRINKING OR SUGGESTED YOU CUT DOWN: 0
HAVE YOU OR SOMEONE ELSE BEEN INJURED AS A RESULT OF YOUR DRINKING: 0
HOW OFTEN DURING THE LAST YEAR HAVE YOU FAILED TO DO WHAT WAS NORMALLY EXPECTED FROM YOU BECAUSE OF DRINKING: 0
HOW OFTEN DURING THE LAST YEAR HAVE YOU FOUND THAT YOU WERE NOT ABLE TO STOP DRINKING ONCE YOU HAD STARTED: 0

## 2024-03-08 ASSESSMENT — PATIENT HEALTH QUESTIONNAIRE - PHQ9
SUM OF ALL RESPONSES TO PHQ QUESTIONS 1-9: 0
1. LITTLE INTEREST OR PLEASURE IN DOING THINGS: 0
2. FEELING DOWN, DEPRESSED OR HOPELESS: 0
SUM OF ALL RESPONSES TO PHQ9 QUESTIONS 1 & 2: 0
SUM OF ALL RESPONSES TO PHQ QUESTIONS 1-9: 0

## 2024-03-08 NOTE — PATIENT INSTRUCTIONS
9 Ways to Cut Back on Drinking  Maybe you've found yourself drinking more alcohol than you'd prefer. If you want to cut back, here are some ideas to try.    Think before you drink.  Do you really want a drink, or is it just a habit? If you're used to having a drink at a certain time, try doing something else then.     Look for substitutes.  Find some no-alcohol drinks that you enjoy, like flavored seltzer water, tea with honey, or tonic with a slice of lime. Or try alcohol-free beer or \"virgin\" cocktails (without the alcohol).     Drink more water.  Use water to quench your thirst. Drink a glass of water before you have any alcohol. Have another glass along with every drink or between drinks.     Shrink your drink.  For example, have a bottle of beer instead of a pint. Use a smaller glass for wine. Choose drinks with lower alcohol content (ABV%). Or use less liquor and more mixer in cocktails.     Slow down.  It's easy to drink quickly and without thinking about it. Pay attention, and make each drink last longer.     Do the math.  Total up how much you spend on alcohol each month. How much is that a year? If you cut back, what could you do with the money you save?     Take a break.  Choose a day or two each week when you won't drink at all. Notice how you feel on those days, physically and emotionally. How did you sleep? Do you feel better? Over time, add more break days.     Count calories.  Would you like to lose some weight? For some people that's a good motivator for cutting back. Figure out how many calories are in each drink. How many does that add up to in a day? In a week? In a month?     Practice saying no.  Be ready when someone offers you a drink. Try: \"Thanks, I've had enough.\" Or \"Thanks, but I'm cutting back.\" Or \"No, thanks. I feel better when I drink less.\"   Current as of: November 15, 2023               Content Version: 14.0  © 1869-2177 [employer].   Care instructions adapted

## 2024-03-08 NOTE — PROGRESS NOTES
Medicare Annual Wellness Visit    Fernando Chu is here for Medicare AWV    Assessment & Plan   Medicare annual wellness visit, subsequent  Essential hypertension  -     propranolol (INDERAL) 40 MG tablet; Take 1 tablet by mouth 2 times daily, Disp-180 tablet, R-3Normal  -     lisinopril (PRINIVIL;ZESTRIL) 10 MG tablet; Take 1 tablet by mouth daily, Disp-90 tablet, R-3This prescription was filled on 10/18/2022. Any refills authorized will be placed on file.Normal  Vitamin D deficiency  -     vitamin D (ERGOCALCIFEROL) 1.25 MG (15040 UT) CAPS capsule; Take 1 capsule by mouth Once a week at 5 PM, Disp-12 capsule, R-0Normal    Follow up with specialist as discussed.  Colonoscopy was completed recently and was negative per his report.      Recommendations for Preventive Services Due: see orders and patient instructions/AVS.  Recommended screening schedule for the next 5-10 years is provided to the patient in written form: see Patient Instructions/AVS.     Return in about 6 months (around 9/8/2024) for Follow up chronic conditions, with labs.     Subjective   The following acute and/or chronic problems were also addressed today:  None    Patient's complete Health Risk Assessment and screening values have been reviewed and are found in Flowsheets. The following problems were reviewed today and where indicated follow up appointments were made and/or referrals ordered.    Positive Risk Factor Screenings with Interventions:         Alcohol Screening:  Alcohol Use: Heavy Drinker (3/8/2024)    AUDIT-C     Frequency of Alcohol Consumption: 4 or more times a week     Average Number of Drinks: 1 or 2     Frequency of Binge Drinking: Less than monthly      AUDIT-C Score: 5   AUDIT Total Score: 5    Interpretation of AUDIT-C score:   3-7 indicates potential alcohol risk.    8 or more is associated with harmful or hazardous drinking.   13 or more in women, and 15 or more in men, is likely to indicate alcohol

## 2024-04-15 ENCOUNTER — LAB (OUTPATIENT)
Dept: LAB | Facility: HOSPITAL | Age: 79
End: 2024-04-15
Payer: MEDICARE

## 2024-04-15 DIAGNOSIS — C18.9 ADENOCARCINOMA OF COLON: Primary | ICD-10-CM

## 2024-04-15 LAB
ALBUMIN SERPL-MCNC: 4.1 G/DL (ref 3.5–5.2)
ALBUMIN/GLOB SERPL: 1.6 G/DL
ALP SERPL-CCNC: 95 U/L (ref 39–117)
ALT SERPL W P-5'-P-CCNC: 26 U/L (ref 1–41)
ANION GAP SERPL CALCULATED.3IONS-SCNC: 9 MMOL/L (ref 5–15)
AST SERPL-CCNC: 25 U/L (ref 1–40)
BASOPHILS # BLD AUTO: 0.04 10*3/MM3 (ref 0–0.2)
BASOPHILS NFR BLD AUTO: 0.6 % (ref 0–1.5)
BILIRUB SERPL-MCNC: 1 MG/DL (ref 0–1.2)
BUN SERPL-MCNC: 13 MG/DL (ref 8–23)
BUN/CREAT SERPL: 16.5 (ref 7–25)
CALCIUM SPEC-SCNC: 9.2 MG/DL (ref 8.6–10.5)
CEA SERPL-MCNC: 1.83 NG/ML
CHLORIDE SERPL-SCNC: 106 MMOL/L (ref 98–107)
CO2 SERPL-SCNC: 25 MMOL/L (ref 22–29)
CREAT SERPL-MCNC: 0.79 MG/DL (ref 0.76–1.27)
DEPRECATED RDW RBC AUTO: 45 FL (ref 37–54)
EGFRCR SERPLBLD CKD-EPI 2021: 89.8 ML/MIN/1.73
EOSINOPHIL # BLD AUTO: 0.2 10*3/MM3 (ref 0–0.4)
EOSINOPHIL NFR BLD AUTO: 3 % (ref 0.3–6.2)
ERYTHROCYTE [DISTWIDTH] IN BLOOD BY AUTOMATED COUNT: 12.5 % (ref 12.3–15.4)
FERRITIN SERPL-MCNC: 233.1 NG/ML (ref 30–400)
GLOBULIN UR ELPH-MCNC: 2.5 GM/DL
GLUCOSE SERPL-MCNC: 106 MG/DL (ref 65–99)
HCT VFR BLD AUTO: 47 % (ref 37.5–51)
HGB BLD-MCNC: 15.9 G/DL (ref 13–17.7)
IMM GRANULOCYTES # BLD AUTO: 0.02 10*3/MM3 (ref 0–0.05)
IMM GRANULOCYTES NFR BLD AUTO: 0.3 % (ref 0–0.5)
IRON 24H UR-MRATE: 172 MCG/DL (ref 59–158)
IRON SATN MFR SERPL: 43 % (ref 20–50)
LYMPHOCYTES # BLD AUTO: 1.61 10*3/MM3 (ref 0.7–3.1)
LYMPHOCYTES NFR BLD AUTO: 24.5 % (ref 19.6–45.3)
MCH RBC QN AUTO: 32.8 PG (ref 26.6–33)
MCHC RBC AUTO-ENTMCNC: 33.8 G/DL (ref 31.5–35.7)
MCV RBC AUTO: 96.9 FL (ref 79–97)
MONOCYTES # BLD AUTO: 0.47 10*3/MM3 (ref 0.1–0.9)
MONOCYTES NFR BLD AUTO: 7.1 % (ref 5–12)
NEUTROPHILS NFR BLD AUTO: 4.24 10*3/MM3 (ref 1.7–7)
NEUTROPHILS NFR BLD AUTO: 64.5 % (ref 42.7–76)
NRBC BLD AUTO-RTO: 0 /100 WBC (ref 0–0.2)
PLATELET # BLD AUTO: 229 10*3/MM3 (ref 140–450)
PMV BLD AUTO: 9.5 FL (ref 6–12)
POTASSIUM SERPL-SCNC: 4.6 MMOL/L (ref 3.5–5.2)
PROT SERPL-MCNC: 6.6 G/DL (ref 6–8.5)
RBC # BLD AUTO: 4.85 10*6/MM3 (ref 4.14–5.8)
SODIUM SERPL-SCNC: 140 MMOL/L (ref 136–145)
TIBC SERPL-MCNC: 404 MCG/DL (ref 298–536)
TRANSFERRIN SERPL-MCNC: 271 MG/DL (ref 200–360)
WBC NRBC COR # BLD AUTO: 6.58 10*3/MM3 (ref 3.4–10.8)

## 2024-04-15 PROCEDURE — 80053 COMPREHEN METABOLIC PANEL: CPT | Performed by: INTERNAL MEDICINE

## 2024-04-15 PROCEDURE — 82378 CARCINOEMBRYONIC ANTIGEN: CPT | Performed by: INTERNAL MEDICINE

## 2024-04-15 PROCEDURE — 83540 ASSAY OF IRON: CPT | Performed by: INTERNAL MEDICINE

## 2024-04-15 PROCEDURE — 85025 COMPLETE CBC W/AUTO DIFF WBC: CPT | Performed by: INTERNAL MEDICINE

## 2024-04-15 PROCEDURE — 84466 ASSAY OF TRANSFERRIN: CPT | Performed by: INTERNAL MEDICINE

## 2024-04-15 PROCEDURE — 82728 ASSAY OF FERRITIN: CPT | Performed by: INTERNAL MEDICINE

## 2024-04-15 PROCEDURE — 36415 COLL VENOUS BLD VENIPUNCTURE: CPT | Performed by: INTERNAL MEDICINE

## 2024-04-16 NOTE — PROGRESS NOTES
[facility]  [facility]  [address]  [place] [address]  [phone]    Patient Name: Migue Rodriguez  Encounter Date: 04/22/2024  YOB: 1945  Patient Number: [id number]     REASON FOR VISIT: Mr. Migue Rodriguez is an 79-year-old male who returns in follow-up of resected stage III colon cancer. He is seen 166.5 months since receipt of cycle 12 adjuvant FOLFOX chemotherapy. He is here alone (usually with his spouse, Ana).      I have reviewed the HPI and verified with the patient the accuracy of it. No changes to interval history since the information was documented. Mariano uY MD 04/22/24      DIAGNOSTIC ABNORMALITIES:   Colonoscopy, 11/08/2010: The study revealed a large polyp at about 22 cm, just above the rectosigmoid junction. This was removed via snare cautery. No other abnormalities were seen to the level of the cecum. Pathology of the colon polyp at 20 cm revealed a tubulovillous adenoma demonstrating moderate atypia. Biopsies from the rectosigmoid junction at that level also revealed fragments of tubulovillous adenoma demonstrating moderate atypia.  CT of the abdomen and pelvis, 11/30/2010: Revealed an eccentric mass involving the sigmoid colon. There was also circumferential thickening of the cecum suspicious for malignancy. There was prominent adjacent node seen in the mesentery just medial to the cecum. The largest measuring 1.0 x 1.5 cm in diameter. There was acentric lobular densities seen involving the sigmoid colon. The prostate appeared mildly enlarged. No other abnormalities identified.  Labs, 12/02/2010: BMP was normal with a creatinine 1.2, a CBC showed a hemoglobin of 9, a hematocrit 29.5, MCV 70.4, MCH 21.5, MCHC 30.5 (each depressed), platelets 530,000, WBC 7.8 with a normal differential.  Labs, 01/26/2011: CMP revealed eGFR of 79.5, phosphorus of 4.7. CEA of 1.4. ferritin of 12, iron of 35, TIBC of 434, iron saturation of 8%,  vitamin B12 of 374, folate of 12.8, PSA of 4.2.  CT chest, abdomen, and pelvis 01/28/2011 [facility]. Negative chest CT. Lungs are clear. Negative upper abdominal CT. The liver is clear. Bowel pattern is normal. Benign 2.3 cm cyst upper pole left kidney. Negative pelvic CT. Previous rectal surgery. No adenopathy or free fluid noted.  Bone scan 01/28/2011, [facility]. Seen projected over the left ankle, there is a single tiny focus of slightly increased radiopharmaceutical uptake consistent with degenerative change. The distribution of radiopharmaceutical is otherwise within normal limits, specifically no suggestion of osseous metastatic disease.    PREVIOUS INTERVENTIONS:   Surgery and laparotomy, 12/06/2010. Intraoperatively, a large palpable mass was encountered in the cecum. Palpation of the liver did not demonstrate any obvious abnormalities. There was no study of the abdomen. The omentum was wrapped around the cecal mass. The tumor was described to be large enough to feel through the wall of the mucosa though there did not seem to be any transmural invasion of the tumor at the rectosigmoid junction. The mass itself was attached to the lateral and anterior abdominal wall, thus a portion of the abdominal wall was resected with the tumor. The sigmoid colon was redundant, thus a generous portion of the sigmoid colon was resected. There was a large tumor at least 2 cm within the sigmoid. The lower anterior resection and right colectomy were completed successfully. 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 The lesion infiltrated through the full thickness of the colonic wall to involve the pericolonic adipose tissue. Surgical margins of excision were negative for malignancy. One lymph node was negative for evidence of malignancy.  INFeD 1000 mg, 01/27/2011 and 02/03/2011 (2000 mg).  FOLFOX, 02/14/2011  through 07/25/2011. 12 cycles.        Problem List Items Addressed This Visit          Other    Adenocarcinoma of colon - Primary    Relevant Orders    CBC & Differential    Comprehensive Metabolic Panel    CEA    Ferritin    Iron Profile       Oncology/Hematology History   Adenocarcinoma of colon   2/23/2020 Initial Diagnosis    Adenocarcinoma of colon (CMS/HCC)     2/23/2020 Cancer Staged    Staging form: Colon And Rectum, AJCC 8th Edition  - Clinical stage from 2/23/2020: Stage IIIB (cT3, cN1, cM0) - Signed by Mariano Yu MD on 2/24/2020         PAST MEDICAL HISTORY:  ALLERGIES:  Allergies   Allergen Reactions    Demerol [Meperidine]      CURRENT MEDICATIONS:  Outpatient Encounter Medications as of 4/22/2024   Medication Sig Dispense Refill    aspirin 81 MG EC tablet Take 1 tablet by mouth Daily.      B Complex-C (SUPER B COMPLEX PO) Take  by mouth.      Coenzyme Q10 200 MG capsule Take 200 mg by mouth Daily.      finasteride (PROSCAR) 5 MG tablet Take 1 tablet by mouth Daily. 90 tablet 4    lisinopril (PRINIVIL,ZESTRIL) 10 MG tablet Take 1 tablet by mouth Daily.      multivitamin (THERAGRAN) tablet tablet Take  by mouth Daily.      Omega-3 Fatty Acids (FISH OIL) 1000 MG capsule capsule Take  by mouth Daily With Breakfast.      propranolol (INDERAL) 40 MG tablet Take 1 tablet by mouth 2 (Two) Times a Day.      tamsulosin (FLOMAX) 0.4 MG capsule 24 hr capsule Take 1 capsule by mouth Daily. nightly 90 capsule 4    vitamin D (ERGOCALCIFEROL) 1.25 MG (94309 UT) capsule capsule Take 1 capsule by mouth 1 (One) Time Per Week.       No facility-administered encounter medications on file as of 4/22/2024.     ADULT ILLNESSES:   Adenocarcinoma of cecum (disorder) ( ICD-10:C18.0 ;Malignant neoplasm of cecum   Benign prostatic hypertrophy ( Prostate biopsy 04/03/2012 (path benign). Dr. Mendoza following; ICD-10:N40.0 ;Enlarged prostate without lower urinary tract symptoms )   Hyperlipidemia ( ICD-10:E78.5  ;Hyperlipidemia, unspecified   Leukopenia ( ICD-10:D72.819 ;Decreased white blood cell count, unspecified   Microcytic anemia ( ICD-10:D50.9 ;Iron deficiency anemia, unspecified   Neuropathy ( ICD-10:G62.9 ;Polyneuropathy, unspecified   Peritonitis ( 1956; ICD-10:K65.9 ;Peritonitis, unspecified )   Seasonal nasal allergies ( ICD-10:J30.1 ;Allergic rhinitis due to pollen   Weight gain ( ICD-10:R63.5 ;Abnormal weight gain    SURGERIES:   Colectomy, 12/2010   Port placement, 02/07/2011. Dr. Mancini   [facility] revealed benign prostatic glands and stroma, 08/07/2012   Colonoscopy, 11/08/2010   Left inguinal hernia repair, 2004. Dr. Mayorga   Report of surveillance colonoscopy last 01/18/2016 from Dr. Mancini. Normal.   Port removal, 08/01/2016. Dr. Mancini.      ADULT ILLNESSES:  Patient Active Problem List   Diagnosis Code    Urgency of urination R39.15    Elevated PSA R97.20    BPH (benign prostatic hypertrophy) with urinary obstruction N40.1, N13.8    Hydrocele N43.3    Adenocarcinoma of colon C18.9    Colon cancer C18.9    Other and unspecified hyperlipidemia E78.5     SURGERIES:  Past Surgical History:   Procedure Laterality Date    APPENDECTOMY      COLONOSCOPY      FOOT SURGERY      HERNIA REPAIR      PROSTATE BIOPSY       HEALTH MAINTENANCE ITEMS:  Health Maintenance Due   Topic Date Due    TDAP/TD VACCINES (1 - Tdap) Never done    ZOSTER VACCINE (1 of 2) Never done    RSV Vaccine - Adults (1 - 1-dose 60+ series) Never done    Pneumococcal Vaccine 65+ (1 of 1 - PCV) Never done    BMI FOLLOWUP  06/11/2020       <no information>  Last Completed Colonoscopy            COLONOSCOPY (Every 10 Years) Next due on 1/25/2034 01/25/2024  SCANNED - COLONOSCOPY    01/25/2024  SCANNED - COLONOSCOPY    03/08/2019  Outside Claim: [place] COLORECTAL SCRN; HI RISK IND                  Immunization History   Administered Date(s) Administered    COVID-19 (MODERNA) 1st,2nd,3rd Dose Monovalent  "02/17/2021, 03/17/2021, 11/03/2021    COVID-19 (MODERNA) BIVALENT 12+YRS 11/17/2022    COVID-19 F23 (PFIZER) 12YRS+ (COMIRNATY) 12/06/2023     Last Completed Mammogram       This patient has no relevant Health Maintenance data.              FAMILY HISTORY:  Family History   Problem Relation Age of Onset    No Known Problems Father     No Known Problems Mother      SOCIAL HISTORY:  Social History     Socioeconomic History    Marital status:    Tobacco Use    Smoking status: Never    Smokeless tobacco: Never   Substance and Sexual Activity    Alcohol use: Yes       REVIEW OF SYSTEMS:  Constitutional:   The patient's appetite and energy are fairly good. \"I play golf sometimes but not been too active cause my wife has dementia.\" He has lost 9 lb (no weight changes at his prior visit) since the last visit.  He has no fevers, chills, or drenching night sweats. His sleep habits seem appropriate.  Has received Covid vaccines x 3 last 11/2021  Ear/Nose/Throat/Mouth:   He has no ear pains but has perennial sinus allergy symptoms. He has no sore throat, nosebleeds, or sore tongue. He has no headaches.  Ocular:   He denies eye pain, significant change in visual acuity, double vision, or blurry vision.  Respiratory:   He has no chronic cough, significant shortness of breathing, phlegm production, or unexplained chest wall pain.    Cardiovascular:   He has no exertional chest pain, chest pressure, or chest heaviness. He reports no claudication. He reports no palpitations or symptomatic orthostasis.  Gastrointestinal:   He reports no dysphagia, nausea, vomiting, postprandial abdominal pain, bloating, cramping. He has not noted any bouts of rectal bleeding after bowel movements and on toilet paper. He was seen by Dr. Mancini last 05/24/2011 for anal fissures. He reports no bouts constipation. His bowels are formed and regular, moving at least 1-2 times daily. Surveillance c-scope by Dr. Mancini last 01/25/2024.  \"All " "good.\"  Genitourinary:   He has no urinary burning, frequency, dribbling. He reports no difficulty controlling his bladder. He needs to urinate up to 2-3 times through the night, better on finasteride. He is followed by Dr. Hudson for urology. Had a prostate biopsy last 04/03/2012. Path was benign.  \"He checks my prostate every time.\"  Musculoskeletal:   He reports no unexplained arthralgias, myalgias, but has nighttime leg cramping.  Extremities:   He reports no trouble with fluid retention or significant leg swelling.  Endocrine:   He reports no problems with excess thirst, excessive urination, vasomotor instability, or unexplained fatigue.  Heme/Lymphatic:   He reports no unexplained bleeding, bruising, petechial rashes, or swollen glands.  Skin:   He reports no new \"skin spots.\" No longer followed by dermatology (Dr. Alexandra in [place]). Denies itching, rashes, or lesions which won't heal.  Neuro:   He reports no loss of consciousness, seizures, fainting spells, or dizziness. He reports no weakness of his face, arms, or legs. He has no difficulty with speech. He has no tremors. He still has minimal residual cold associated paresthesias of the hands and feet, very minimal on B complex multivitamin. \"Not any worse though.\"  Psych:   He seems generally satisfied with life. He denies depression but admits to situational anxiety. He reports no mood swings.      VITAL SIGNS: /76   Pulse 64   Temp 97.3 °F (36.3 °C) (Temporal)   Resp 18   Ht 177.8 cm (70\")   Wt 89 kg (196 lb 4.8 oz)   SpO2 98%   BMI 28.17 kg/m² Body surface area is 2.07 meters squared.  Pain Score    04/22/24 1318   PainSc: 0-No pain     I have reexamined the patient and the results are consistent with the previously documented exam. Mariano Yu MD        PHYSICAL EXAMINATION:   General:   He is a pleasant, obese, well-kept elderly male who is comfortable at rest. He arrived in the exam room ambulatory. He appears to be his stated age. " His skin color is normal. ECOG PS = 0 (same).  Head/Neck:   The patient is anicteric and atraumatic. The trachea is midline. The neck is supple without evidence of jugular venous distention or cervical adenopathy.  Eyes:   The pupils are equal, round, and reactive to light. The extraocular movements are full. There is no scleral jaundice or erythema.  Chest:   The respiratory efforts are normal and unhindered. The chest is clear to auscultation and percussion. There are no wheezes, rhonchi, rales, or asymmetry of breath sounds. The port site in the left upper chest is well healed (device removed)  Cardiovascular:   The patient has a regular cardiac rate and rhythm without murmurs, rubs, or gallops. The peripheral pulses are equal and full.  Abdomen:   The belly is soft and globose. The firm, nontender subcutaneous nodule just right of the epigastrium that feels more cystic than solid, again ~ 4 x 3 cm (grossly unchanged; previously up to ~ 5 x 6 cm). A similar lesion of similar consistency to the left of the midline is noted, measuring approximately 4 x 3 cm (unchanged). The midline longitudinal scar is noted. There remains subtle fullness underlying an older paraumbilical scar on the right (unchanged from prior exams). There is no rebound or guarding. There is no organomegaly, mass-effect, or tenderness. Bowel sounds are active and of normal character.  Extremities:   There is no evidence of cyanosis, clubbing, or edema.  Rheumatologic:   There is no overt evidence of rheumatoid deformities of the hands. There is no sausaging of the fingers. There is no sign of active synovitis. The gait is normal.  Cutaneous:   There are no overt rashes, disseminated lesions, purpura, or petechiae.  Lymphatics:   There is no evidence of adenopathy in the cervical, supraclavicular, axillary, inguinal, or femoral areas.  Neurologic:   The patient is alert, oriented, cooperative, and pleasant. He is appropriately conversant. He  ambulated into the exam room without assistance and transferred from chair to exam table unaided. There is no overt dysfunction of the motor, sensory, or cerebellar systems.  Psych:   Mood and affect are appropriate for circumstance. Eye contact is appropriate.      ASSESSMENT:   1. 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   Stage: IIIB (pT3, pN1, Mx).   Tumor [place]: Cecal mass with infiltration through the full thickness of the colonic wall to involve the pericolonic adipose tissue (T3), and 1/15 regional lymph nodes involved with metastatic carcinoma.   Complication of Tumor: None   Tumor Status: Grossly resected, 01/07/2011. Adjuvant systemic therapy completed.   03/08/2019-colonoscopy by Dr. Mancini--.  Normal rectum.  20 cm from the anal verge anatomy consistent with anastomosis.  No abnormal findings.  Prior colon resection in 2 different locations from synchronous colon cancer.  2/14/24- Colonoscopy, Dr. Mancini-rectum grossly normal.  Sigmoid colon contained a few diverticula.  Advanced 5 cm from the anal verge, patient's colonic enteric anastomosis was reached.  No other gross abnormalities were seen.  2. Microcytic anemia from iron deficiency related to the colon malignancy + chemo.   --Resolved.  Hgb 15.9, 4/15/24 (prior range: HGB 12.8 - 18).   3. Trivially elevated ALT (fatty liver?).  Normal since 3/3/2023  4. Benign prostatic hypertrophy (BPH) with PSA = 5.8, 01/14/2020 (prior range: 3.7 - 4.33). Prostate biopsy, 04/03/2012 (path benign). Will defer monitoring of PSA to urology (Dr. Hudson).   5. Neuropathy, chemo residual. Subjectively minimal on B-complex multivitamin.   6. Weight gain from calories greater than expenditure, gaining 4 pounds since his last visit.   7. Firm, nontender subcutaneous nodule just right of the epigastrium that feels more solid than cystic, measuring approximately 4 x 3 cm (previously 5 x 6 cm) and another just left of the midline 4 x 4 cm (same). Ultrasound  "(above) confirms multiple solid subcutaneous nodules, lipomas, or other benign nodules. Has been seen by Dr. Mancini.   8. Keratotic skin lesions (face and back). Followed by dermatology in [place]. Previously discussed 08/29/2014 letter from Dr. Shahid Vasquez Re: Patient did not keep 08/21/2014 appointment. Said that he saw a \"skin doctor\" in [place], now thinks it is Dr. Conklin, was told everything alright and to come back in 1 year.     PLAN:   1.  Apprised of labs from 4/15/24 with normal CBC repleted iron levels, normal CMP, and normal CEA.   2.  Review surveillance c-scope, 1/25/24 (above).  SEBASTIAN  3. The role of adjuvant chemotherapy was previously discussed. I had explained that benefit has been unequivocally demonstrated (reduction in recurrence and mortality) for patients with resected Stage III colorectal cancer. The benefits of adjuvant chemotherapy (adjusted for age, tumor grade, lymph node involvement, and type of chemo regimen) were specifically outlined:  a. Reduction in mortality: 48%. 5 year absolute benefit: 6.2/14.4 alive.  b. Reduction in recurrence: 48%. 5 year absolute benefit: 7.8/18 without relapse.  4. Observe  5. Continue other currently identified medications.  6. Continue ongoing management per primary care, and other specialists.   7. Return to the office with pre-office (specify fasting) iron, fe sat, ferritin, CMP, CEA and CBC with differential in 12 months.      I spent ~30 minutes caring for Migue on this date of service. This time includes time spent by me in the following activities: preparing for the visit, reviewing tests, performing a medically appropriate examination and/or evaluation, counseling and educating the patient/family/caregiver, ordering medications, tests, or procedures and documenting information in the medical record        cc: MD Bladimir Rooney MD             "

## 2024-04-22 ENCOUNTER — OFFICE VISIT (OUTPATIENT)
Dept: ONCOLOGY | Facility: CLINIC | Age: 79
End: 2024-04-22
Payer: MEDICARE

## 2024-04-22 VITALS
SYSTOLIC BLOOD PRESSURE: 140 MMHG | TEMPERATURE: 97.3 F | RESPIRATION RATE: 18 BRPM | WEIGHT: 196.3 LBS | OXYGEN SATURATION: 98 % | DIASTOLIC BLOOD PRESSURE: 76 MMHG | HEIGHT: 70 IN | HEART RATE: 64 BPM | BODY MASS INDEX: 28.1 KG/M2

## 2024-04-22 DIAGNOSIS — C18.9 ADENOCARCINOMA OF COLON: Primary | ICD-10-CM

## 2024-04-22 PROCEDURE — 1126F AMNT PAIN NOTED NONE PRSNT: CPT | Performed by: INTERNAL MEDICINE

## 2024-04-22 PROCEDURE — 1159F MED LIST DOCD IN RCRD: CPT | Performed by: INTERNAL MEDICINE

## 2024-04-22 PROCEDURE — 99214 OFFICE O/P EST MOD 30 MIN: CPT | Performed by: INTERNAL MEDICINE

## 2024-04-22 PROCEDURE — 1160F RVW MEDS BY RX/DR IN RCRD: CPT | Performed by: INTERNAL MEDICINE

## 2024-06-14 NOTE — PROGRESS NOTES
Subjective    Mr. Rodriguez is 79 y.o. male    Chief Complaint: BPH    History of Present Illness    78yo male established patient annual followup for history of enlarged prostate and ED. LUTS are well controlled on finasteride and tamsulosin.  He is no longer using the tadalafil.  UA today is clear.     The following portions of the patient's history were reviewed and updated as appropriate: allergies, current medications, past family history, past medical history, past social history, past surgical history and problem list.    Review of Systems      Current Outpatient Medications:     aspirin 81 MG EC tablet, Take 1 tablet by mouth Daily., Disp: , Rfl:     B Complex-C (SUPER B COMPLEX PO), Take  by mouth., Disp: , Rfl:     Coenzyme Q10 200 MG capsule, Take 200 mg by mouth Daily., Disp: , Rfl:     finasteride (PROSCAR) 5 MG tablet, Take 1 tablet by mouth Daily., Disp: 90 tablet, Rfl: 4    lisinopril (PRINIVIL,ZESTRIL) 10 MG tablet, Take 1 tablet by mouth Daily., Disp: , Rfl:     multivitamin (THERAGRAN) tablet tablet, Take  by mouth Daily., Disp: , Rfl:     Omega-3 Fatty Acids (FISH OIL) 1000 MG capsule capsule, Take  by mouth Daily With Breakfast., Disp: , Rfl:     propranolol (INDERAL) 40 MG tablet, Take 1 tablet by mouth 2 (Two) Times a Day., Disp: , Rfl:     tamsulosin (FLOMAX) 0.4 MG capsule 24 hr capsule, Take 1 capsule by mouth Every Night. nightly, Disp: 90 capsule, Rfl: 4    vitamin D (ERGOCALCIFEROL) 1.25 MG (28246 UT) capsule capsule, Take 1 capsule by mouth 1 (One) Time Per Week., Disp: , Rfl:     Past Medical History:   Diagnosis Date    BPH with urinary obstruction     Colon cancer     Elevated PSA     Frequency of urination     Hydrocele     Hyperlipemia     Peritonitis     Testalgia        Past Surgical History:   Procedure Laterality Date    APPENDECTOMY      COLONOSCOPY      FOOT SURGERY      HERNIA REPAIR      PROSTATE BIOPSY         Social History     Socioeconomic History    Marital status:  "   Tobacco Use    Smoking status: Never     Passive exposure: Never    Smokeless tobacco: Never   Vaping Use    Vaping status: Never Used   Substance and Sexual Activity    Alcohol use: Yes    Drug use: Never    Sexual activity: Defer       Family History   Problem Relation Age of Onset    No Known Problems Father     No Known Problems Mother        Objective    Temp 97.6 °F (36.4 °C)   Ht 177.8 cm (70\")   Wt 88.9 kg (196 lb)   BMI 28.12 kg/m²     Physical Exam        Results for orders placed or performed in visit on 06/20/24   POC Urinalysis Dipstick, Multipro    Specimen: Urine   Result Value Ref Range    Color Yellow Yellow, Straw, Dark Yellow, Cori    Clarity, UA Clear Clear    Glucose, UA Negative Negative mg/dL    Bilirubin Negative Negative    Ketones, UA Negative Negative    Specific Gravity  1.015 1.005 - 1.030    Blood, UA Trace (A) Negative    pH, Urine 7.0 5.0 - 8.0    Protein, POC Negative Negative mg/dL    Urobilinogen, UA Normal Normal, 0.2 E.U./dL    Nitrite, UA Negative Negative    Leukocytes Negative Negative     Assessment and Plan    Diagnoses and all orders for this visit:    1. BPH (benign prostatic hypertrophy) with urinary obstruction (Primary)  -     POC Urinalysis Dipstick, Multipro    2. Urgency of urination  -     tamsulosin (FLOMAX) 0.4 MG capsule 24 hr capsule; Take 1 capsule by mouth Every Night. nightly  Dispense: 90 capsule; Refill: 4    3. Benign prostatic hyperplasia with lower urinary tract symptoms, symptom details unspecified  -     finasteride (PROSCAR) 5 MG tablet; Take 1 tablet by mouth Daily.  Dispense: 90 tablet; Refill: 4      LUTS well controlled on tamsulosin and finasteride-continue.   He will follow-up in 1 year in my [facility] or sooner as needed.         This document has been signed by ELEANOR Hudson MD on June 20, 2024 14:09 CDT          "

## 2024-06-20 ENCOUNTER — OFFICE VISIT (OUTPATIENT)
Dept: UROLOGY | Facility: CLINIC | Age: 79
End: 2024-06-20
Payer: MEDICARE

## 2024-06-20 VITALS — HEIGHT: 70 IN | TEMPERATURE: 97.6 F | BODY MASS INDEX: 28.06 KG/M2 | WEIGHT: 196 LBS

## 2024-06-20 DIAGNOSIS — N13.8 BENIGN PROSTATIC HYPERPLASIA WITH URINARY OBSTRUCTION: Primary | ICD-10-CM

## 2024-06-20 DIAGNOSIS — N40.1 BENIGN PROSTATIC HYPERPLASIA WITH URINARY OBSTRUCTION: Primary | ICD-10-CM

## 2024-06-20 DIAGNOSIS — R39.15 URGENCY OF URINATION: ICD-10-CM

## 2024-06-20 DIAGNOSIS — N40.1 BENIGN PROSTATIC HYPERPLASIA WITH LOWER URINARY TRACT SYMPTOMS, SYMPTOM DETAILS UNSPECIFIED: ICD-10-CM

## 2024-06-20 LAB
BILIRUB BLD-MCNC: NEGATIVE MG/DL
CLARITY, POC: CLEAR
COLOR UR: YELLOW
GLUCOSE UR STRIP-MCNC: NEGATIVE MG/DL
KETONES UR QL: NEGATIVE
LEUKOCYTE EST, POC: NEGATIVE
NITRITE UR-MCNC: NEGATIVE MG/ML
PH UR: 7 [PH] (ref 5–8)
PROT UR STRIP-MCNC: NEGATIVE MG/DL
RBC # UR STRIP: ABNORMAL /UL
SP GR UR: 1.01 (ref 1–1.03)
UROBILINOGEN UR QL: NORMAL

## 2024-06-20 RX ORDER — FINASTERIDE 5 MG/1
5 TABLET, FILM COATED ORAL DAILY
Qty: 90 TABLET | Refills: 4 | Status: SHIPPED | OUTPATIENT
Start: 2024-06-20

## 2024-06-20 RX ORDER — TAMSULOSIN HYDROCHLORIDE 0.4 MG/1
1 CAPSULE ORAL NIGHTLY
Qty: 90 CAPSULE | Refills: 4 | Status: SHIPPED | OUTPATIENT
Start: 2024-06-20

## 2024-07-03 DIAGNOSIS — E55.9 VITAMIN D DEFICIENCY: ICD-10-CM

## 2024-07-05 RX ORDER — ERGOCALCIFEROL 1.25 MG/1
CAPSULE ORAL
Qty: 12 CAPSULE | Refills: 0 | Status: SHIPPED | OUTPATIENT
Start: 2024-07-05

## 2024-07-05 NOTE — TELEPHONE ENCOUNTER
Received fax from pharmacy requesting refill on pts medication(s). Pt was last seen in office on 3/8/2024  and has a follow up scheduled for 9/10/2024. Will send request to  [facility]  for authorization.     Requested Prescriptions     Pending Prescriptions Disp Refills    vitamin D (ERGOCALCIFEROL) 1.25 MG (77377 UT) CAPS capsule [Pharmacy Med Name: ergocalciferol (vitamin D2) 1,250 mcg (50,000 unit) capsule] 12 capsule 0     Sig: TAKE ONE CAPSULE BY MOUTH EVERY WEEK

## 2024-07-30 ENCOUNTER — OFFICE VISIT (OUTPATIENT)
Dept: FAMILY MEDICINE CLINIC | Age: 79
End: 2024-07-30
Payer: MEDICARE

## 2024-07-30 ENCOUNTER — TELEPHONE (OUTPATIENT)
Dept: INTERNAL MEDICINE | Facility: CLINIC | Age: 79
End: 2024-07-30
Payer: MEDICARE

## 2024-07-30 ENCOUNTER — TELEPHONE (OUTPATIENT)
Dept: FAMILY MEDICINE CLINIC | Age: 79
End: 2024-07-30

## 2024-07-30 ENCOUNTER — HOSPITAL ENCOUNTER (OUTPATIENT)
Dept: GENERAL RADIOLOGY | Age: 79
Discharge: HOME | End: 2024-07-30
Payer: MEDICARE

## 2024-07-30 VITALS
TEMPERATURE: 98.1 F | HEIGHT: 70 IN | OXYGEN SATURATION: 97 % | HEART RATE: 70 BPM | BODY MASS INDEX: 27.77 KG/M2 | DIASTOLIC BLOOD PRESSURE: 90 MMHG | SYSTOLIC BLOOD PRESSURE: 138 MMHG | WEIGHT: 194 LBS

## 2024-07-30 DIAGNOSIS — N43.3 HYDROCELE OF TESTIS: Primary | ICD-10-CM

## 2024-07-30 DIAGNOSIS — K85.90 ACUTE PANCREATITIS, UNSPECIFIED COMPLICATION STATUS, UNSPECIFIED PANCREATITIS TYPE: ICD-10-CM

## 2024-07-30 DIAGNOSIS — N43.3 HYDROCELE OF TESTIS: ICD-10-CM

## 2024-07-30 DIAGNOSIS — F41.9 ANXIETY: ICD-10-CM

## 2024-07-30 DIAGNOSIS — I10 ESSENTIAL HYPERTENSION: ICD-10-CM

## 2024-07-30 DIAGNOSIS — K29.80 DUODENITIS: ICD-10-CM

## 2024-07-30 LAB
ALBUMIN SERPL-MCNC: 3 G/DL (ref 3.5–5.2)
ALP SERPL-CCNC: 98 U/L (ref 40–130)
ALT SERPL-CCNC: 31 U/L (ref 5–41)
AMYLASE SERPL-CCNC: 26 U/L (ref 28–100)
ANION GAP SERPL CALCULATED.3IONS-SCNC: 17 MMOL/L (ref 7–19)
AST SERPL-CCNC: 33 U/L (ref 5–40)
BACTERIA URNS QL MICRO: NEGATIVE /HPF
BILIRUB SERPL-MCNC: 0.9 MG/DL (ref 0.2–1.2)
BILIRUB UR QL STRIP: ABNORMAL
BUN SERPL-MCNC: 12 MG/DL (ref 8–23)
CALCIUM SERPL-MCNC: 8.9 MG/DL (ref 8.8–10.2)
CHLORIDE SERPL-SCNC: 99 MMOL/L (ref 98–111)
CLARITY UR: CLEAR
CO2 SERPL-SCNC: 21 MMOL/L (ref 22–29)
COLOR UR: ABNORMAL
CREAT SERPL-MCNC: 0.6 MG/DL (ref 0.5–1.2)
CRP SERPL HS-MCNC: 15.06 MG/DL (ref 0–0.5)
CRYSTALS URNS MICRO: ABNORMAL /HPF
EPI CELLS #/AREA URNS AUTO: 1 /HPF (ref 0–5)
ERYTHROCYTE [DISTWIDTH] IN BLOOD BY AUTOMATED COUNT: 12.8 % (ref 11.5–14.5)
ERYTHROCYTE [SEDIMENTATION RATE] IN BLOOD BY WESTERGREN METHOD: 78 MM/HR (ref 0–15)
GLUCOSE SERPL-MCNC: 125 MG/DL (ref 74–109)
GLUCOSE UR STRIP.AUTO-MCNC: NEGATIVE MG/DL
HCT VFR BLD AUTO: 41 % (ref 42–52)
HGB BLD-MCNC: 13.5 G/DL (ref 14–18)
HGB UR STRIP.AUTO-MCNC: NEGATIVE MG/L
HYALINE CASTS #/AREA URNS AUTO: 15 /HPF (ref 0–8)
KETONES UR STRIP.AUTO-MCNC: 15 MG/DL
LEUKOCYTE ESTERASE UR QL STRIP.AUTO: NEGATIVE
LIPASE SERPL-CCNC: 16 U/L (ref 13–60)
MAGNESIUM SERPL-MCNC: 2.3 MG/DL (ref 1.6–2.4)
MCH RBC QN AUTO: 33.3 PG (ref 27–31)
MCHC RBC AUTO-ENTMCNC: 32.9 G/DL (ref 33–37)
MCV RBC AUTO: 101.2 FL (ref 80–94)
NITRITE UR QL STRIP.AUTO: NEGATIVE
PH UR STRIP.AUTO: 6.5 [PH] (ref 5–8)
PLATELET # BLD AUTO: 298 K/UL (ref 130–400)
PMV BLD AUTO: 10.2 FL (ref 9.4–12.4)
POTASSIUM SERPL-SCNC: 3.6 MMOL/L (ref 3.5–5)
PROT SERPL-MCNC: 6.2 G/DL (ref 6.6–8.7)
PROT UR STRIP.AUTO-MCNC: 30 MG/DL
RBC # BLD AUTO: 4.05 M/UL (ref 4.7–6.1)
RBC #/AREA URNS AUTO: 3 /HPF (ref 0–4)
REASON FOR REJECTION: NORMAL
REJECTED TEST: NORMAL
SODIUM SERPL-SCNC: 137 MMOL/L (ref 136–145)
SP GR UR STRIP.AUTO: 1.02 (ref 1–1.03)
UROBILINOGEN UR STRIP.AUTO-MCNC: 1 E.U./DL
WBC # BLD AUTO: 11.7 K/UL (ref 4.8–10.8)
WBC #/AREA URNS AUTO: 5 /HPF (ref 0–5)

## 2024-07-30 PROCEDURE — 3075F SYST BP GE 130 - 139MM HG: CPT | Performed by: NURSE PRACTITIONER

## 2024-07-30 PROCEDURE — 3080F DIAST BP >= 90 MM HG: CPT | Performed by: NURSE PRACTITIONER

## 2024-07-30 PROCEDURE — 1123F ACP DISCUSS/DSCN MKR DOCD: CPT | Performed by: NURSE PRACTITIONER

## 2024-07-30 PROCEDURE — 99214 OFFICE O/P EST MOD 30 MIN: CPT | Performed by: NURSE PRACTITIONER

## 2024-07-30 PROCEDURE — 76870 US EXAM SCROTUM: CPT

## 2024-07-30 RX ORDER — HYDROXYZINE HYDROCHLORIDE 25 MG/1
25 TABLET, FILM COATED ORAL EVERY 8 HOURS PRN
Qty: 60 TABLET | Refills: 0 | Status: SHIPPED | OUTPATIENT
Start: 2024-07-30 | End: 2024-08-29

## 2024-07-30 RX ORDER — TRAMADOL HYDROCHLORIDE 50 MG/1
50 TABLET ORAL EVERY 4 HOURS PRN
Qty: 16 TABLET | Refills: 0 | Status: SHIPPED | OUTPATIENT
Start: 2024-07-30 | End: 2024-08-02 | Stop reason: SDUPTHER

## 2024-07-30 RX ORDER — OMEPRAZOLE 20 MG/1
20 CAPSULE, DELAYED RELEASE ORAL
Qty: 30 CAPSULE | Refills: 0 | Status: SHIPPED | OUTPATIENT
Start: 2024-07-30

## 2024-07-30 NOTE — TELEPHONE ENCOUNTER
----- Message from LAKEISHA Garcia sent at 7/30/2024  4:03 PM CDT -----  Please let patient know that ultrasound of scrotum and testicles has returned.  Does show Hydrocil as discussed.  I do recommend following up with urology as previously discussed.

## 2024-07-30 NOTE — PROGRESS NOTES
"Subjective    Mr. Rodriguez is 79 y.o. male    Chief Complaint: Hydrocele    History of Present Illness  78yo male established patient with known history of enlarged prostate and chronic right-sided hydrocele just seen by me in June for his annual follow-up and prescription refills.  He called and made this appointment to \"get checked out\" after recent hospitalization for pancreatitis and he became concerned he may have \"torn something\" with some lower abdominal discomfort.  UA today is unremarkable.  He denies hematuria or urinary retention.  LUTS are well controlled on finasteride and tamsulosin.  He is no longer using the tadalafil.  He had scrotal ultrasound done at [facility] on 7/30/2024 demonstrating known right-sided simple hydrocele, smaller left-sided hydrocele, normal-appearing testicles without mass.    I independently visualized and reviewed the patient's prior imaging studies today in clinic and discussed the imaging findings with the patient.      The following portions of the patient's history were reviewed and updated as appropriate: allergies, current medications, past family history, past medical history, past social history, past surgical history and problem list.    Review of Systems      Current Outpatient Medications:     B Complex-C (SUPER B COMPLEX PO), Take  by mouth., Disp: , Rfl:     cloNIDine (CATAPRES) 0.1 MG tablet, TAKE ONE TABLET BY MOUTH EVERY TWELVE HOURS AS NEEDED, Disp: , Rfl:     Coenzyme Q10 200 MG capsule, Take 200 mg by mouth Daily., Disp: , Rfl:     finasteride (PROSCAR) 5 MG tablet, Take 1 tablet by mouth Daily., Disp: 90 tablet, Rfl: 4    hydrOXYzine (ATARAX) 25 MG tablet, Take 1 tablet by mouth Every 8 (Eight) Hours As Needed., Disp: , Rfl:     lisinopril (PRINIVIL,ZESTRIL) 10 MG tablet, Take 1 tablet by mouth Daily., Disp: , Rfl:     multivitamin (THERAGRAN) tablet tablet, Take  by mouth Daily., Disp: , Rfl:     Omega-3 Fatty Acids (FISH OIL) 1000 MG capsule capsule, Take  by " "mouth Daily With Breakfast., Disp: , Rfl:     omeprazole (priLOSEC) 20 MG capsule, Take 1 capsule by mouth., Disp: , Rfl:     propranolol (INDERAL) 40 MG tablet, Take 1 tablet by mouth 2 (Two) Times a Day., Disp: , Rfl:     tamsulosin (FLOMAX) 0.4 MG capsule 24 hr capsule, Take 1 capsule by mouth Every Night. nightly, Disp: 90 capsule, Rfl: 4    traMADol (ULTRAM) 50 MG tablet, Take 1 tablet by mouth., Disp: , Rfl:     vitamin D (ERGOCALCIFEROL) 1.25 MG (18294 UT) capsule capsule, Take 1 capsule by mouth 1 (One) Time Per Week., Disp: , Rfl:     Past Medical History:   Diagnosis Date    BPH with urinary obstruction     Colon cancer     Elevated PSA     Frequency of urination     Hydrocele     Hyperlipemia     Peritonitis     Testalgia        Past Surgical History:   Procedure Laterality Date    APPENDECTOMY      COLONOSCOPY      FOOT SURGERY      HERNIA REPAIR      PROSTATE BIOPSY         Social History     Socioeconomic History    Marital status:    Tobacco Use    Smoking status: Never     Passive exposure: Never    Smokeless tobacco: Never   Vaping Use    Vaping status: Never Used   Substance and Sexual Activity    Alcohol use: Yes    Drug use: Never    Sexual activity: Defer       Family History   Problem Relation Age of Onset    No Known Problems Father     No Known Problems Mother        Objective    Temp 98.2 °F (36.8 °C)   Ht 177.8 cm (70\")   Wt 90.7 kg (200 lb)   BMI 28.70 kg/m²     Physical Exam  Moderate nontender right-sided hydrocele obscuring right testis.  Left testis normal.  No inguinal hernia appreciable.  Large midline well-healed abdominal scar.      Results for orders placed or performed in visit on 08/01/24   POC Urinalysis Dipstick, Multipro    Specimen: Urine   Result Value Ref Range    Color Yellow Yellow, Straw, Dark Yellow, Cori    Clarity, UA Clear Clear    Glucose, UA Negative Negative mg/dL    Bilirubin Negative Negative    Ketones, UA Trace (A) Negative    Specific Gravity  " 1.020 1.005 - 1.030    Blood, UA Negative Negative    pH, Urine 7.0 5.0 - 8.0    Protein, POC Trace (A) Negative mg/dL    Urobilinogen, UA Normal Normal, 0.2 E.U./dL    Nitrite, UA Negative Negative    Leukocytes Negative Negative     Assessment and Plan    Diagnoses and all orders for this visit:    1. Other hydrocele (Primary)  -     POC Urinalysis Dipstick, Multipro    2. BPH (benign prostatic hypertrophy) with urinary obstruction      Asymptomatic right-sided hydrocele.  No evidence for hernia on exam.  LUTS well-controlled on combination therapy-continue finasteride and tamsulosin.  He was reassured that I can see no evidence for new urologic problem at this time.  He will follow-up with me as scheduled next year or sooner as needed.      This document has been signed by ELEANOR Hudson MD on August 1, 2024 10:09 CDT

## 2024-07-30 NOTE — TELEPHONE ENCOUNTER
Pt's daughter stopped by clinic wanting to schedule an appt with Dr Hudson.  I scheduled pt for Thursday, with Dr FONSECA for Hydrocele and his daughter, Eveline Johnson will bring recent US results & imaging he had done @ [facility] w/his NP.  She said he was admitted 7/24/24 at [facility] therefore, I have requested records and imaging.

## 2024-07-30 NOTE — PROGRESS NOTES
[facility]  [address] [place] [address]  Dept: [phone]  Dept Fax: [phone]  Loc: [phone]    Fernando Chu is a 79 y.o. male who presents today for his medical conditions/complaints as noted below.  Fernando Chu is c/o of Follow-Up from Hospital      Chief Complaint   Patient presents with    Follow-Up from Hospital       HPI:     HPI  Patient presents today for hospital follow up.  He was admitted for pancreatitis and duodenitis.  He states that he is still having abd pain.  He states that the pain is affecting his sleep.  Decreased appetite.    He is under a lot of stress   He is also having issues with hydrocele as well.  He has noticed some increased swelling right.  He does follow-up with Dr. Bernal in [place] and reports going to them soon for evaluation on this.    Past Medical History:   Diagnosis Date    Cancer (HCC)     colon    Hyperlipidemia     Port-A-Cath in place     [facility]        Past Surgical History:   Procedure Laterality Date    COLON SURGERY  [place]    [place]section [place]    COLONOSCOPY  2013    Dr. Odonnell    COLONOSCOPY  2016    Dr. Odonnell    FRACTURE SURGERY      left foot    HERNIA REPAIR  11/15/2004    Dr. Valdez       Social History     Tobacco Use    Smoking status: Former     Current packs/day: 0.00     Types: Cigarettes     Quit date: 1990     Years since quittin.6    Smokeless tobacco: Never   Substance Use Topics    Alcohol use: Yes     Alcohol/week: 1.0 standard drink of alcohol     Types: 1 Glasses of wine per week        Current Outpatient Medications   Medication Sig Dispense Refill    hydrOXYzine HCl (ATARAX) 25 MG tablet Take 1 tablet by mouth every 8 hours as needed for Anxiety 60 tablet 0    traMADol (ULTRAM) 50 MG tablet Take 1 tablet by mouth every 4 hours as needed for Pain for up to 3 days. Intended supply: 3 days. Take lowest dose possible to manage pain Max Daily Amount:

## 2024-07-31 ENCOUNTER — TELEPHONE (OUTPATIENT)
Dept: FAMILY MEDICINE CLINIC | Age: 79
End: 2024-07-31

## 2024-07-31 ASSESSMENT — ENCOUNTER SYMPTOMS
EYES NEGATIVE: 1
RESPIRATORY NEGATIVE: 1
ABDOMINAL PAIN: 1

## 2024-07-31 NOTE — TELEPHONE ENCOUNTER
----- Message from LAKEISHA Garcia sent at 7/30/2024  4:22 PM CDT -----  Labs have returned and amylase lipase have returned in normal range.  Total protein was decreased on CMP.  Would recommend increasing protein intake as discussed.  CRP which is an inflammation marker slightly increased again related to the pancreatitis.

## 2024-07-31 NOTE — TELEPHONE ENCOUNTER
Spoke with: Child/Children regarding the results of the patients most recent US.  I advised Child/Children of Misty Blas recommendations.   Child/Children did voice understanding

## 2024-08-01 ENCOUNTER — OFFICE VISIT (OUTPATIENT)
Dept: UROLOGY | Facility: CLINIC | Age: 79
End: 2024-08-01
Payer: MEDICARE

## 2024-08-01 VITALS — WEIGHT: 200 LBS | BODY MASS INDEX: 28.63 KG/M2 | TEMPERATURE: 98.2 F | HEIGHT: 70 IN

## 2024-08-01 DIAGNOSIS — N43.2 OTHER HYDROCELE: Primary | ICD-10-CM

## 2024-08-01 DIAGNOSIS — N13.8 BENIGN PROSTATIC HYPERPLASIA WITH URINARY OBSTRUCTION: ICD-10-CM

## 2024-08-01 DIAGNOSIS — N40.1 BENIGN PROSTATIC HYPERPLASIA WITH URINARY OBSTRUCTION: ICD-10-CM

## 2024-08-01 LAB
BILIRUB BLD-MCNC: NEGATIVE MG/DL
CLARITY, POC: CLEAR
COLOR UR: YELLOW
GLUCOSE UR STRIP-MCNC: NEGATIVE MG/DL
KETONES UR QL: ABNORMAL
LEUKOCYTE EST, POC: NEGATIVE
NITRITE UR-MCNC: NEGATIVE MG/ML
PH UR: 7 [PH] (ref 5–8)
PROT UR STRIP-MCNC: ABNORMAL MG/DL
RBC # UR STRIP: NEGATIVE /UL
SP GR UR: 1.02 (ref 1–1.03)
UROBILINOGEN UR QL: NORMAL

## 2024-08-01 RX ORDER — OMEPRAZOLE 20 MG/1
20 CAPSULE, DELAYED RELEASE ORAL
COMMUNITY
Start: 2024-07-30

## 2024-08-01 RX ORDER — CLONIDINE HYDROCHLORIDE 0.1 MG/1
TABLET ORAL
COMMUNITY
Start: 2024-07-26

## 2024-08-01 RX ORDER — HYDROXYZINE HYDROCHLORIDE 25 MG/1
1 TABLET, FILM COATED ORAL EVERY 8 HOURS PRN
COMMUNITY
Start: 2024-07-30 | End: 2024-08-30

## 2024-08-01 RX ORDER — TRAMADOL HYDROCHLORIDE 50 MG/1
50 TABLET ORAL
COMMUNITY
Start: 2024-07-30 | End: 2024-08-03

## 2024-08-01 NOTE — LETTER
"August 1, 2024     Yuliana Bajwa, APRN  [address]  [place] [address]    Patient: Migue Rodriguez   YOB: 1945   Date of Visit: 8/1/2024     Dear Yuliana,    Thank you for referring Migue Rodriguez to me for evaluation. Below are the relevant portions of my assessment and plan of care.    If you have questions, please do not hesitate to call me. I look forward to following Migue along with you.         Sincerely,        Derek Hudson MD        CC: Javier Harrington MD      Progress Notes:  Subjective    Mr. Rodriguez is 79 y.o. male    Chief Complaint: Hydrocele    History of Present Illness  78yo male established patient with known history of enlarged prostate and chronic right-sided hydrocele just seen by me in June for his annual follow-up and prescription refills.  He called and made this appointment to \"get checked out\" after recent hospitalization for pancreatitis and he became concerned he may have \"torn something\" with some lower abdominal discomfort.  UA today is unremarkable.  He denies hematuria or urinary retention.  LUTS are well controlled on finasteride and tamsulosin.  He is no longer using the tadalafil.  He had scrotal ultrasound done at [facility] on 7/30/2024 demonstrating known right-sided simple hydrocele, smaller left-sided hydrocele, normal-appearing testicles without mass.    I independently visualized and reviewed the patient's prior imaging studies today in clinic and discussed the imaging findings with the patient.      The following portions of the patient's history were reviewed and updated as appropriate: allergies, current medications, past family history, past medical history, past social history, past surgical history and problem list.    Review of Systems      Current Outpatient Medications:   •  B Complex-C (SUPER B COMPLEX PO), Take  by mouth., Disp: , Rfl:   •  cloNIDine (CATAPRES) 0.1 MG tablet, TAKE ONE TABLET BY MOUTH EVERY TWELVE HOURS AS NEEDED, Disp: , Rfl: " "  •  Coenzyme Q10 200 MG capsule, Take 200 mg by mouth Daily., Disp: , Rfl:   •  finasteride (PROSCAR) 5 MG tablet, Take 1 tablet by mouth Daily., Disp: 90 tablet, Rfl: 4  •  hydrOXYzine (ATARAX) 25 MG tablet, Take 1 tablet by mouth Every 8 (Eight) Hours As Needed., Disp: , Rfl:   •  lisinopril (PRINIVIL,ZESTRIL) 10 MG tablet, Take 1 tablet by mouth Daily., Disp: , Rfl:   •  multivitamin (THERAGRAN) tablet tablet, Take  by mouth Daily., Disp: , Rfl:   •  Omega-3 Fatty Acids (FISH OIL) 1000 MG capsule capsule, Take  by mouth Daily With Breakfast., Disp: , Rfl:   •  omeprazole (priLOSEC) 20 MG capsule, Take 1 capsule by mouth., Disp: , Rfl:   •  propranolol (INDERAL) 40 MG tablet, Take 1 tablet by mouth 2 (Two) Times a Day., Disp: , Rfl:   •  tamsulosin (FLOMAX) 0.4 MG capsule 24 hr capsule, Take 1 capsule by mouth Every Night. nightly, Disp: 90 capsule, Rfl: 4  •  traMADol (ULTRAM) 50 MG tablet, Take 1 tablet by mouth., Disp: , Rfl:   •  vitamin D (ERGOCALCIFEROL) 1.25 MG (89097 UT) capsule capsule, Take 1 capsule by mouth 1 (One) Time Per Week., Disp: , Rfl:     Past Medical History:   Diagnosis Date   • BPH with urinary obstruction    • Colon cancer    • Elevated PSA    • Frequency of urination    • Hydrocele    • Hyperlipemia    • Peritonitis    • Testalgia        Past Surgical History:   Procedure Laterality Date   • APPENDECTOMY     • COLONOSCOPY     • FOOT SURGERY     • HERNIA REPAIR     • PROSTATE BIOPSY         Social History     Socioeconomic History   • Marital status:    Tobacco Use   • Smoking status: Never     Passive exposure: Never   • Smokeless tobacco: Never   Vaping Use   • Vaping status: Never Used   Substance and Sexual Activity   • Alcohol use: Yes   • Drug use: Never   • Sexual activity: Defer       Family History   Problem Relation Age of Onset   • No Known Problems Father    • No Known Problems Mother        Objective    Temp 98.2 °F (36.8 °C)   Ht 177.8 cm (70\")   Wt 90.7 kg (200 lb) "   BMI 28.70 kg/m²     Physical Exam  Moderate nontender right-sided hydrocele obscuring right testis.  Left testis normal.  No inguinal hernia appreciable.  Large midline well-healed abdominal scar.      Results for orders placed or performed in visit on 08/01/24   POC Urinalysis Dipstick, Multipro    Specimen: Urine   Result Value Ref Range    Color Yellow Yellow, Straw, Dark Yellow, Cori    Clarity, UA Clear Clear    Glucose, UA Negative Negative mg/dL    Bilirubin Negative Negative    Ketones, UA Trace (A) Negative    Specific Gravity  1.020 1.005 - 1.030    Blood, UA Negative Negative    pH, Urine 7.0 5.0 - 8.0    Protein, POC Trace (A) Negative mg/dL    Urobilinogen, UA Normal Normal, 0.2 E.U./dL    Nitrite, UA Negative Negative    Leukocytes Negative Negative     Assessment and Plan    Diagnoses and all orders for this visit:    1. Other hydrocele (Primary)  -     POC Urinalysis Dipstick, Multipro    2. BPH (benign prostatic hypertrophy) with urinary obstruction      Asymptomatic right-sided hydrocele.  No evidence for hernia on exam.  LUTS well-controlled on combination therapy-continue finasteride and tamsulosin.  He was reassured that I can see no evidence for new urologic problem at this time.  He will follow-up with me as scheduled next year or sooner as needed.      This document has been signed by ELEANOR Hudson MD on August 1, 2024 10:09 CDT

## 2024-08-02 ENCOUNTER — TELEPHONE (OUTPATIENT)
Dept: FAMILY MEDICINE CLINIC | Age: 79
End: 2024-08-02

## 2024-08-02 DIAGNOSIS — K85.90 ACUTE PANCREATITIS, UNSPECIFIED COMPLICATION STATUS, UNSPECIFIED PANCREATITIS TYPE: ICD-10-CM

## 2024-08-02 DIAGNOSIS — N43.3 HYDROCELE OF TESTIS: ICD-10-CM

## 2024-08-02 RX ORDER — TRAMADOL HYDROCHLORIDE 50 MG/1
50 TABLET ORAL EVERY 4 HOURS PRN
Qty: 16 TABLET | Refills: 0 | Status: SHIPPED | OUTPATIENT
Start: 2024-08-04 | End: 2024-08-07

## 2024-08-02 NOTE — TELEPHONE ENCOUNTER
I have sent in another round of Tramadol to be picked up on the weekend.  I would recommend Tylenol instead of Ibuprofen if needed

## 2024-08-02 NOTE — TELEPHONE ENCOUNTER
Pt was seen by Urologist yesterday and everything is good there. Daughter is requesting a few more pain pills for pt in case he runs out over the weekend.     She is also wanting to know if he can take Tylenol or Ibuprofen when weaning off of the pain meds due to having the pancreatitis.     Will send to provider for recommendations.

## 2024-08-07 ENCOUNTER — TELEPHONE (OUTPATIENT)
Dept: FAMILY MEDICINE CLINIC | Age: 79
End: 2024-08-07

## 2024-08-07 NOTE — TELEPHONE ENCOUNTER
Pts daughter called stating he is doing some better but believes he is having issues with allergies and constantly clearing his throat. He voice sounds weak and she is wanting to know if there is something he  can take for this.     Will send to provider for recommendations.

## 2024-08-08 ENCOUNTER — LAB (OUTPATIENT)
Dept: FAMILY MEDICINE CLINIC | Age: 79
End: 2024-08-08
Payer: MEDICARE

## 2024-08-08 DIAGNOSIS — E53.8 B12 DEFICIENCY: Primary | ICD-10-CM

## 2024-08-08 PROCEDURE — 96372 THER/PROPH/DIAG INJ SC/IM: CPT | Performed by: NURSE PRACTITIONER

## 2024-08-08 RX ORDER — LEVOCETIRIZINE DIHYDROCHLORIDE 5 MG/1
5 TABLET, FILM COATED ORAL NIGHTLY
Qty: 90 TABLET | Refills: 3 | Status: SHIPPED | OUTPATIENT
Start: 2024-08-08

## 2024-08-08 RX ORDER — CYANOCOBALAMIN 1000 UG/ML
1000 INJECTION, SOLUTION INTRAMUSCULAR; SUBCUTANEOUS ONCE
Status: COMPLETED | OUTPATIENT
Start: 2024-08-08 | End: 2024-08-08

## 2024-08-08 RX ADMIN — CYANOCOBALAMIN 1000 MCG: 1000 INJECTION, SOLUTION INTRAMUSCULAR; SUBCUTANEOUS at 14:53

## 2024-08-08 NOTE — TELEPHONE ENCOUNTER
Call returned to pt daughter with ANA LILIA SUAZO, recommendations. Daughter understood. She is still concerned also with the fact that he still has very low energy and strength. He is still eating a bland diet due to recently having Pancreatitis.  Daughter is just wanting to know if there is something else that they can do (B12 shots) or if they are just going to have to be patient and let this come back on its own.     Will send back to provider for recommendations.

## 2024-08-08 NOTE — PROGRESS NOTES
After obtaining consent and per order of LAKEISHA Garcia, gave patient vitamin b12 1000 mcg injection in Left deltoid, patient tolerated well.  Medication was not supplied by the patient.

## 2024-08-08 NOTE — TELEPHONE ENCOUNTER
Called and spoke with pts daughter to verify this medication and dosage. Pt has been out of this for a while.     Will send baclk to provider for recommendations.

## 2024-08-08 NOTE — TELEPHONE ENCOUNTER
We can try a vitamin b12 injection and see if that helps some x 1 dose.  But most of time it will just take time to improve due to him becoming weak during hospitalization.  We could try referral to physical therapy for strengthening if they like to try this just due to the generalized weakness.

## 2024-08-13 ENCOUNTER — OFFICE VISIT (OUTPATIENT)
Dept: FAMILY MEDICINE CLINIC | Age: 79
End: 2024-08-13
Payer: MEDICARE

## 2024-08-13 VITALS
BODY MASS INDEX: 25.2 KG/M2 | WEIGHT: 176 LBS | SYSTOLIC BLOOD PRESSURE: 118 MMHG | TEMPERATURE: 97.5 F | HEART RATE: 72 BPM | OXYGEN SATURATION: 98 % | HEIGHT: 70 IN | DIASTOLIC BLOOD PRESSURE: 70 MMHG

## 2024-08-13 DIAGNOSIS — R53.83 DECREASED STAMINA: ICD-10-CM

## 2024-08-13 DIAGNOSIS — R06.02 SHORTNESS OF BREATH: ICD-10-CM

## 2024-08-13 DIAGNOSIS — E55.9 VITAMIN D DEFICIENCY: ICD-10-CM

## 2024-08-13 DIAGNOSIS — R53.83 OTHER FATIGUE: ICD-10-CM

## 2024-08-13 DIAGNOSIS — R53.83 OTHER FATIGUE: Primary | ICD-10-CM

## 2024-08-13 DIAGNOSIS — R53.1 GENERAL WEAKNESS: ICD-10-CM

## 2024-08-13 LAB
25(OH)D3 SERPL-MCNC: 52.7 NG/ML
ALBUMIN SERPL-MCNC: 3.8 G/DL (ref 3.5–5.2)
ALP SERPL-CCNC: 113 U/L (ref 40–130)
ALT SERPL-CCNC: 25 U/L (ref 5–41)
ANION GAP SERPL CALCULATED.3IONS-SCNC: 12 MMOL/L (ref 7–19)
AST SERPL-CCNC: 26 U/L (ref 5–40)
BILIRUB SERPL-MCNC: 0.6 MG/DL (ref 0.2–1.2)
BUN SERPL-MCNC: 20 MG/DL (ref 8–23)
CALCIUM SERPL-MCNC: 9.8 MG/DL (ref 8.8–10.2)
CHLORIDE SERPL-SCNC: 102 MMOL/L (ref 98–111)
CO2 SERPL-SCNC: 24 MMOL/L (ref 22–29)
CREAT SERPL-MCNC: 0.8 MG/DL (ref 0.5–1.2)
ERYTHROCYTE [DISTWIDTH] IN BLOOD BY AUTOMATED COUNT: 12.2 % (ref 11.5–14.5)
GLUCOSE SERPL-MCNC: 133 MG/DL (ref 74–109)
HCT VFR BLD AUTO: 43.5 % (ref 42–52)
HGB BLD-MCNC: 14.4 G/DL (ref 14–18)
MCH RBC QN AUTO: 32.6 PG (ref 27–31)
MCHC RBC AUTO-ENTMCNC: 33.1 G/DL (ref 33–37)
MCV RBC AUTO: 98.4 FL (ref 80–94)
PLATELET # BLD AUTO: 502 K/UL (ref 130–400)
PMV BLD AUTO: 9.1 FL (ref 9.4–12.4)
POTASSIUM SERPL-SCNC: 5 MMOL/L (ref 3.5–5)
PROT SERPL-MCNC: 7.1 G/DL (ref 6.6–8.7)
RBC # BLD AUTO: 4.42 M/UL (ref 4.7–6.1)
SODIUM SERPL-SCNC: 138 MMOL/L (ref 136–145)
VIT B12 SERPL-MCNC: 1209 PG/ML (ref 211–946)
WBC # BLD AUTO: 6.8 K/UL (ref 4.8–10.8)

## 2024-08-13 PROCEDURE — 1123F ACP DISCUSS/DSCN MKR DOCD: CPT | Performed by: NURSE PRACTITIONER

## 2024-08-13 PROCEDURE — 99214 OFFICE O/P EST MOD 30 MIN: CPT | Performed by: NURSE PRACTITIONER

## 2024-08-13 ASSESSMENT — ENCOUNTER SYMPTOMS
SHORTNESS OF BREATH: 1
GASTROINTESTINAL NEGATIVE: 1
EYES NEGATIVE: 1

## 2024-08-13 NOTE — PROGRESS NOTES
[facility]  [address] [place] [address]  Dept: [phone]  Dept Fax: [phone]  Loc: [phone]    Fernando Chu is a 79 y.o. male who presents today for his medical conditions/complaints as noted below.  Fernando Chu is c/o of Fatigue and referral for physical therapy       Chief Complaint   Patient presents with    Fatigue    referral for physical therapy        HPI:     HPI  Patient presents today with complaints of continued fatigue.  He states that he would like to discuss referral for physical therapy. He continues to feel weak from the illness that happened last month.  He was diagnosed and stayed in the hospital for pancreatitis. Patient has noticed a decrease in his stamina.  He just does not have the strength to finish things sometimes.  Denies depression, but does have increased stress.      Past Medical History:   Diagnosis Date    Cancer (HCC)     colon    Hyperlipidemia     Port-A-Cath in place     [facility]        Past Surgical History:   Procedure Laterality Date    COLON SURGERY  2010    [place]section [place]    COLONOSCOPY  2013    Dr. Odonnell    COLONOSCOPY  2016    Dr. Odonnell    FRACTURE SURGERY      left foot    HERNIA REPAIR  11/15/2004    Dr. Valdez       Social History     Tobacco Use    Smoking status: Former     Current packs/day: 0.00     Types: Cigarettes     Quit date: 1990     Years since quittin.6    Smokeless tobacco: Never   Substance Use Topics    Alcohol use: Yes     Alcohol/week: 1.0 standard drink of alcohol     Types: 1 Glasses of wine per week        Current Outpatient Medications   Medication Sig Dispense Refill    levocetirizine (XYZAL ALLERGY 24HR) 5 MG tablet Take 1 tablet by mouth nightly 90 tablet 3    hydrOXYzine HCl (ATARAX) 25 MG tablet Take 1 tablet by mouth every 8 hours as needed for Anxiety 60 tablet 0    omeprazole (PRILOSEC) 20 MG delayed release capsule Take 1 capsule by

## 2024-08-14 ENCOUNTER — TELEPHONE (OUTPATIENT)
Dept: FAMILY MEDICINE CLINIC | Age: 79
End: 2024-08-14

## 2024-08-14 NOTE — TELEPHONE ENCOUNTER
----- Message from LAKEISHA Garcia sent at 8/13/2024  4:20 PM CDT -----  Please let patient know that so far labs that have returned was CMP and this did show normal electrolytes, liver function and renal function.  CBC improved from last check.  White count was in normal range and hemoglobin hematocrit did not show any anemia.

## 2024-08-14 NOTE — TELEPHONE ENCOUNTER
----- Message from LAKEISHA Garcia sent at 8/14/2024  9:19 AM CDT -----  Vitamin D level was in normal range and B12 was increased as well.  (Patient did have B12 injections recently)

## 2024-08-19 ENCOUNTER — HOSPITAL ENCOUNTER (OUTPATIENT)
Age: 79
Discharge: HOME | End: 2024-08-21
Payer: MEDICARE

## 2024-08-19 VITALS
BODY MASS INDEX: 24.62 KG/M2 | HEIGHT: 70 IN | SYSTOLIC BLOOD PRESSURE: 108 MMHG | WEIGHT: 172 LBS | DIASTOLIC BLOOD PRESSURE: 73 MMHG

## 2024-08-19 DIAGNOSIS — R53.83 DECREASED STAMINA: ICD-10-CM

## 2024-08-19 DIAGNOSIS — R06.02 SHORTNESS OF BREATH: ICD-10-CM

## 2024-08-19 DIAGNOSIS — R53.83 OTHER FATIGUE: ICD-10-CM

## 2024-08-19 LAB
ECHO BSA: 1.96 M2
STRESS BASELINE DIAS BP: 73 MMHG
STRESS BASELINE HR: 77 BPM
STRESS BASELINE SYS BP: 108 MMHG
STRESS EXERCISE DUR MIN: 6 MIN
STRESS EXERCISE DUR SEC: 39 SEC
STRESS O2 SAT PEAK: 94 %
STRESS O2 SAT REST: 99 %
STRESS PEAK DIAS BP: 60 MMHG
STRESS PEAK SYS BP: 128 MMHG
STRESS PERCENT HR ACHIEVED: 90 %
STRESS POST PEAK HR: 126 BPM
STRESS RATE PRESSURE PRODUCT: NORMAL BPM*MMHG
STRESS ST DEPRESSION: 1.5 MM
STRESS STAGE 1 BP: NORMAL MMHG
STRESS STAGE 1 DURATION: 2 MIN:SEC
STRESS STAGE 1 HR: 92 BPM
STRESS STAGE 2 BP: NORMAL MMHG
STRESS STAGE 2 DURATION: 2 MIN:SEC
STRESS STAGE 2 HR: 99 BPM
STRESS STAGE 3 BP: NORMAL MMHG
STRESS STAGE 3 DURATION: NORMAL MIN:SEC
STRESS STAGE 3 HR: 123 BPM
STRESS STAGE RECOVERY 1 BP: NORMAL MMHG
STRESS STAGE RECOVERY 1 DURATION: NORMAL MIN:SEC
STRESS STAGE RECOVERY 1 HR: 83 BPM
STRESS TARGET HR: 140 BPM

## 2024-08-19 PROCEDURE — 93352 ADMIN ECG CONTRAST AGENT: CPT | Performed by: INTERNAL MEDICINE

## 2024-08-19 PROCEDURE — 6360000004 HC RX CONTRAST MEDICATION: Performed by: INTERNAL MEDICINE

## 2024-08-19 PROCEDURE — 93017 CV STRESS TEST TRACING ONLY: CPT

## 2024-08-19 PROCEDURE — 93018 CV STRESS TEST I&R ONLY: CPT | Performed by: INTERNAL MEDICINE

## 2024-08-19 PROCEDURE — 6360000002 HC RX W HCPCS: Performed by: INTERNAL MEDICINE

## 2024-08-19 PROCEDURE — 93350 STRESS TTE ONLY: CPT | Performed by: INTERNAL MEDICINE

## 2024-08-19 PROCEDURE — 2580000003 HC RX 258: Performed by: INTERNAL MEDICINE

## 2024-08-19 PROCEDURE — 93016 CV STRESS TEST SUPVJ ONLY: CPT | Performed by: INTERNAL MEDICINE

## 2024-08-19 RX ORDER — DOBUTAMINE HYDROCHLORIDE 200 MG/100ML
10-50 INJECTION INTRAVENOUS CONTINUOUS PRN
Status: DISPENSED | OUTPATIENT
Start: 2024-08-19 | End: 2024-08-20

## 2024-08-19 RX ORDER — ATROPINE SULFATE 0.1 MG/ML
1 INJECTION INTRAVENOUS PRN
Status: DISPENSED | OUTPATIENT
Start: 2024-08-19 | End: 2024-08-20

## 2024-08-19 RX ORDER — METOPROLOL TARTRATE 1 MG/ML
5 INJECTION, SOLUTION INTRAVENOUS PRN
Status: DISPENSED | OUTPATIENT
Start: 2024-08-19 | End: 2024-08-20

## 2024-08-19 RX ORDER — SODIUM CHLORIDE 9 MG/ML
INJECTION, SOLUTION INTRAVENOUS
Status: COMPLETED | OUTPATIENT
Start: 2024-08-19 | End: 2024-08-19

## 2024-08-19 RX ADMIN — SODIUM CHLORIDE: 9 INJECTION, SOLUTION INTRAVENOUS at 14:28

## 2024-08-19 RX ADMIN — PERFLUTREN 1.5 ML: 6.52 INJECTION, SUSPENSION INTRAVENOUS at 14:25

## 2024-08-19 RX ADMIN — DOBUTAMINE HYDROCHLORIDE 10 MCG/KG/MIN: 200 INJECTION INTRAVENOUS at 14:28

## 2024-08-20 ENCOUNTER — TELEPHONE (OUTPATIENT)
Dept: FAMILY MEDICINE CLINIC | Age: 79
End: 2024-08-20

## 2024-08-20 DIAGNOSIS — R94.39 POSITIVE CARDIAC STRESS TEST: Primary | ICD-10-CM

## 2024-08-20 NOTE — TELEPHONE ENCOUNTER
Called cardiology and spoke to Mary she states the soonest any Dr Has available would not be until November- I gave verbal approval for pt to see a APRN - pt is scheduled 9/5/24 @ 2 pm and was placed on cancellation list for any provider in the office

## 2024-08-20 NOTE — TELEPHONE ENCOUNTER
Called patient, spoke with: Patient regarding the results of the patients most recent stress test .  I advised Patient of Misty Blas recommendations.   Patient did voice understanding      Sent to Misty RODRIGUEZ to make her aware of appt day and time

## 2024-08-20 NOTE — TELEPHONE ENCOUNTER
----- Message from LAKEISHA Garcia sent at 8/20/2024  8:35 AM CDT -----  Please let patient know that stress test was abnormal.  Due to his symptoms I am sending a referral to cardiology for likely cath.   I have placed referral.  Please verify that patient gets appointment.

## 2024-08-29 ENCOUNTER — OFFICE VISIT (OUTPATIENT)
Dept: FAMILY MEDICINE CLINIC | Age: 79
End: 2024-08-29
Payer: MEDICARE

## 2024-08-29 VITALS
SYSTOLIC BLOOD PRESSURE: 120 MMHG | WEIGHT: 173 LBS | DIASTOLIC BLOOD PRESSURE: 68 MMHG | HEIGHT: 70 IN | TEMPERATURE: 97.9 F | HEART RATE: 78 BPM | OXYGEN SATURATION: 98 % | BODY MASS INDEX: 24.77 KG/M2

## 2024-08-29 DIAGNOSIS — F32.A DEPRESSION, UNSPECIFIED DEPRESSION TYPE: ICD-10-CM

## 2024-08-29 DIAGNOSIS — F41.9 ANXIETY: Primary | ICD-10-CM

## 2024-08-29 PROCEDURE — 1123F ACP DISCUSS/DSCN MKR DOCD: CPT | Performed by: NURSE PRACTITIONER

## 2024-08-29 PROCEDURE — 99214 OFFICE O/P EST MOD 30 MIN: CPT | Performed by: NURSE PRACTITIONER

## 2024-08-29 RX ORDER — HYDROXYZINE HYDROCHLORIDE 25 MG/1
25 TABLET, FILM COATED ORAL 3 TIMES DAILY
Qty: 90 TABLET | Refills: 0 | Status: SHIPPED | OUTPATIENT
Start: 2024-08-29 | End: 2024-09-28

## 2024-08-29 ASSESSMENT — ENCOUNTER SYMPTOMS
EYES NEGATIVE: 1
RESPIRATORY NEGATIVE: 1
GASTROINTESTINAL NEGATIVE: 1

## 2024-08-29 NOTE — PROGRESS NOTES
[facility]  [address] [place] [address]  Dept: [phone]  Dept Fax: [phone]  Loc: [phone]    Fernando Chu is a 79 y.o. male who presents today for his medical conditions/complaints as noted below.  Fernando Chu is c/o of Discuss Medications      Chief Complaint   Patient presents with    Discuss Medications       HPI:     HPI  Patient presents today to discuss medications.  He states that his daughter believes that he needs to be on an anti-depressant.  He states that his wife is in nursing home.  Patient has not been taking the hydroxyzine that was sent in last month regularly.  He states maybe once a day.    Past Medical History:   Diagnosis Date    Cancer (HCC)     colon    Hyperlipidemia     Port-A-Cath in place     [facility]        Past Surgical History:   Procedure Laterality Date    COLON SURGERY  2010    [place]section [place]    COLONOSCOPY  2013    Dr. Odonnell    COLONOSCOPY  2016    Dr. Odonnell    FRACTURE SURGERY      left foot    HERNIA REPAIR  11/15/2004    Dr. Valdez       Social History     Tobacco Use    Smoking status: Former     Current packs/day: 0.00     Types: Cigarettes     Quit date: 1990     Years since quittin.6    Smokeless tobacco: Never   Substance Use Topics    Alcohol use: Yes     Alcohol/week: 1.0 standard drink of alcohol     Types: 1 Glasses of wine per week        Current Outpatient Medications   Medication Sig Dispense Refill    hydrOXYzine HCl (ATARAX) 25 MG tablet Take 1 tablet by mouth in the morning, at noon, and at bedtime 90 tablet 0    levocetirizine (XYZAL ALLERGY 24HR) 5 MG tablet Take 1 tablet by mouth nightly 90 tablet 3    omeprazole (PRILOSEC) 20 MG delayed release capsule Take 1 capsule by mouth every morning (before breakfast) 30 capsule 0    vitamin D (ERGOCALCIFEROL) 1.25 MG (84464 UT) CAPS capsule TAKE ONE CAPSULE BY MOUTH EVERY WEEK 12 capsule 0    propranolol  hydrOXYzine HCl (ATARAX) 25 MG tablet; Take 1 tablet by mouth in the morning, at noon, and at bedtime  Dispense: 90 tablet; Refill: 0    2. Depression, unspecified depression type    Patient does feel like anxiety and depression has been worsening.  Patient would like to start taking hydroxyzine 3 times daily and see if that helps with symptoms prior to starting another medication.  He does have follow-up with cardiology next Thursday and this has caused his increased stress as well due to the positive stress test.  He is aware that if increase of Atarax does not help with his symptoms will need to start another medication such as Prozac.       Return in about 8 days (around 9/6/2024) for Medication Follow Up.    No orders of the defined types were placed in this encounter.      Orders Placed This Encounter   Medications    hydrOXYzine HCl (ATARAX) 25 MG tablet     Sig: Take 1 tablet by mouth in the morning, at noon, and at bedtime     Dispense:  90 tablet     Refill:  0            Patient offered educational handouts and has had all questions answered.  Patient voices understanding and agrees to plans along with risks and benefits of plan. Patient is instructed to continue prior meds, diet, and exercise plans as instructed. Patient agrees to follow up as instructed and sooner if needed.  Patient agrees to go to ER if condition becomes emergent.      EMR Dragon/transcription disclaimer: Some of this encounter note is an electronic transcription/translation of spoken language to printed text. The electronic translation of spoken language may permit erroneous, or at times, nonsensical words or phrases to be inadvertently transcribed. Although I have reviewed the note for such errors, some may still exist.    Electronically signed by LAKEISHA Garcia on 8/29/2024 at 11:50 AM

## 2024-09-05 ENCOUNTER — OFFICE VISIT (OUTPATIENT)
Dept: CARDIOLOGY CLINIC | Age: 79
End: 2024-09-05
Payer: MEDICARE

## 2024-09-05 VITALS
WEIGHT: 173 LBS | DIASTOLIC BLOOD PRESSURE: 82 MMHG | HEART RATE: 79 BPM | HEIGHT: 70 IN | SYSTOLIC BLOOD PRESSURE: 134 MMHG | BODY MASS INDEX: 24.77 KG/M2

## 2024-09-05 DIAGNOSIS — Z01.818 PRE-OP TESTING: Primary | ICD-10-CM

## 2024-09-05 DIAGNOSIS — Z01.818 PRE-OP TESTING: ICD-10-CM

## 2024-09-05 DIAGNOSIS — R94.39 ABNORMAL CARDIOVASCULAR STRESS TEST: ICD-10-CM

## 2024-09-05 DIAGNOSIS — R94.39 ABNORMAL STRESS ECHO: Primary | ICD-10-CM

## 2024-09-05 DIAGNOSIS — I10 PRIMARY HYPERTENSION: ICD-10-CM

## 2024-09-05 DIAGNOSIS — R06.02 SHORTNESS OF BREATH: ICD-10-CM

## 2024-09-05 DIAGNOSIS — R53.83 OTHER FATIGUE: ICD-10-CM

## 2024-09-05 LAB
ALBUMIN SERPL-MCNC: 3.9 G/DL (ref 3.5–5.2)
ALP SERPL-CCNC: 120 U/L (ref 40–129)
ALT SERPL-CCNC: 20 U/L (ref 5–41)
ANION GAP SERPL CALCULATED.3IONS-SCNC: 13 MMOL/L (ref 7–19)
AST SERPL-CCNC: 23 U/L (ref 5–40)
BILIRUB SERPL-MCNC: 1.1 MG/DL (ref 0.2–1.2)
BUN SERPL-MCNC: 17 MG/DL (ref 8–23)
CALCIUM SERPL-MCNC: 9.6 MG/DL (ref 8.8–10.2)
CHLORIDE SERPL-SCNC: 103 MMOL/L (ref 98–111)
CO2 SERPL-SCNC: 24 MMOL/L (ref 22–29)
CREAT SERPL-MCNC: 0.8 MG/DL (ref 0.7–1.2)
ERYTHROCYTE [DISTWIDTH] IN BLOOD BY AUTOMATED COUNT: 13.1 % (ref 11.5–14.5)
GLUCOSE SERPL-MCNC: 114 MG/DL (ref 70–99)
HCT VFR BLD AUTO: 39.3 % (ref 42–52)
HGB BLD-MCNC: 13.2 G/DL (ref 14–18)
MCH RBC QN AUTO: 33 PG (ref 27–31)
MCHC RBC AUTO-ENTMCNC: 33.6 G/DL (ref 33–37)
MCV RBC AUTO: 98.3 FL (ref 80–94)
PLATELET # BLD AUTO: 247 K/UL (ref 130–400)
PMV BLD AUTO: 9.6 FL (ref 9.4–12.4)
POTASSIUM SERPL-SCNC: 4.1 MMOL/L (ref 3.5–5)
PROT SERPL-MCNC: 7.1 G/DL (ref 6.4–8.3)
RBC # BLD AUTO: 4 M/UL (ref 4.7–6.1)
SODIUM SERPL-SCNC: 140 MMOL/L (ref 136–145)
WBC # BLD AUTO: 9.3 K/UL (ref 4.8–10.8)

## 2024-09-05 PROCEDURE — 3079F DIAST BP 80-89 MM HG: CPT | Performed by: CLINICAL NURSE SPECIALIST

## 2024-09-05 PROCEDURE — 1123F ACP DISCUSS/DSCN MKR DOCD: CPT | Performed by: CLINICAL NURSE SPECIALIST

## 2024-09-05 PROCEDURE — 93000 ELECTROCARDIOGRAM COMPLETE: CPT | Performed by: CLINICAL NURSE SPECIALIST

## 2024-09-05 PROCEDURE — 3075F SYST BP GE 130 - 139MM HG: CPT | Performed by: CLINICAL NURSE SPECIALIST

## 2024-09-05 PROCEDURE — 99203 OFFICE O/P NEW LOW 30 MIN: CPT | Performed by: CLINICAL NURSE SPECIALIST

## 2024-09-05 RX ORDER — ISOSORBIDE MONONITRATE 30 MG/1
30 TABLET, EXTENDED RELEASE ORAL DAILY
Qty: 30 TABLET | Refills: 3 | Status: SHIPPED | OUTPATIENT
Start: 2024-09-05

## 2024-09-05 NOTE — PATIENT INSTRUCTIONS
Testing:  Start isosorbide 30 mg daily.  Do not take Cialis while on this medication    Heart catheterization scheduled      Maintain good blood pressure control-goal<130/80 at rest  Maintain good cholesterol control LDL goal<70 with arterial disease  If you are diabetic work to keep/obtain hemoglobin A1c< 7    Follow up after procedure  Call with any questions or concerns  Follow up with PCP for non cardiac problems  Report any new problems  Cardiovascular Fitness-Exercise as tolerated.  Strive for 30 minutes of exercise most days of the week.    Cardiac / Healthy Diet  Continue current medications as directed  Continue plan of treatment

## 2024-09-05 NOTE — PROGRESS NOTES
[facility], [facility]  [address] [address]  Phone: [phone]  Fax: [phone]    OFFICE VISIT:  2024    Fernando Chu - : 1945    Dear LAKEISHA Garcia,     I appreciate the opportunity of participating in the care of Fernando Chu.  He is a very pleasant 79 y.o. male who I had the opportunity of seeing in my office today, 24.  Records from your office have been obtained and reviewed.    Reason For Visit:  Fernando is a 79 y.o. male who is here for New Patient (Positive Stress test   no symptoms)    Patient saw primary care in August.  Ongoing fatigue and shortness of breath recommended stress testing  Patient under dobutamine stress echo with electrocardiographic evidence of myocardial ischemia.    He is here today for evaluation accompanied with his son.  He states he has been under a lot of stress.  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  He has noticed more shaking and tremor.  Has ongoing neuropathy from what he believes his chemotherapy for colon cancer back in   He is usually quite active.  He has had significant amount of weight loss with all the stress over the last several months    Subjective  Fernando has no exertional chest pain, pressure, burning or squeezing.  He is able to lie flat without evidence of orthopnea or paroxysmal nocturnal dyspnea.  No symptomatic tachy- or emily-arrhythmia.  No numbness or weakness to suggest cerebrovascular accident or transient ischemic attack.  Reports no edema.     Fernando Chu has the following history as recorded in [facility]:  Patient Active Problem List    Diagnosis Date Noted    Port-A-Cath in place 2022    Abnormal cardiovascular stress test 2024    Adenocarcinoma of colon (HCC) 2020    Urgency of urination 2016    Hydrocele 2016    Elevated PSA 2016    Benign prostatic hyperplasia with urinary

## 2024-09-05 NOTE — PROGRESS NOTES
[place] at the [facility] [place] located on the first floor of [facility].   Enter through hospital main entrance and turn immediately to your left.    Spoke with: Fernando Chu    Date: 9/19/24  Arrival 7:30 am    Pre-operative work-up:  CBC, BMP, and Chest x-ray.    Allergies:  Demerol hcl [meperidine]   Contact number:  [phone] (home)     Cardiac Catheterization Instructions   Do not eat or drink anything after midnight on the night before your procedure.  You can take your morning medications with a sip of water unless otherwise directed not to.    Bring a list of the names and dosages of all the medications you are taking.    You should arrange to have someone take you home rather than drive yourself.  Further plan will depend upon the result of the cardiac catheterization.      If for any reason you are unable to keep this appointment, please contact [facility], [phone], as soon as possible to reschedule.  -------------------------------------------------------------------------------------------------------------------  Cardiac Catheterization   (Coronary Angiography; Coronary Arteriography; Coronary Angiogram)   Definition:  Cardiac catheterization is a test that uses a catheter (tube) and x-ray machine to assess the heart and its blood supply.     Reasons for Procedure   It is used to find the cause of symptoms, like chest pain, that could mean heart problems.   Cardiac catheterization helps doctors to:   Identify narrowed or clogged arteries of the heart   Measure blood pressure within the heart   Evaluate how well the heart valves and chambers function   Check heart defects   Evaluate an enlarged heart   Decide on an appropriate treatment   Possible Complications   If you are planning to have a cardiac catheterization, your doctor will review a list of possible complications, which may include:   Bleeding at the point of the catheter insertion

## 2024-09-06 ENCOUNTER — OFFICE VISIT (OUTPATIENT)
Dept: FAMILY MEDICINE CLINIC | Age: 79
End: 2024-09-06
Payer: MEDICARE

## 2024-09-06 VITALS
HEART RATE: 68 BPM | OXYGEN SATURATION: 97 % | HEIGHT: 70 IN | BODY MASS INDEX: 24.77 KG/M2 | TEMPERATURE: 97.9 F | SYSTOLIC BLOOD PRESSURE: 122 MMHG | DIASTOLIC BLOOD PRESSURE: 70 MMHG | WEIGHT: 173 LBS

## 2024-09-06 DIAGNOSIS — F41.9 ANXIETY AND DEPRESSION: Primary | ICD-10-CM

## 2024-09-06 DIAGNOSIS — R94.39 POSITIVE CARDIAC STRESS TEST: ICD-10-CM

## 2024-09-06 DIAGNOSIS — F32.A ANXIETY AND DEPRESSION: Primary | ICD-10-CM

## 2024-09-06 PROCEDURE — 1123F ACP DISCUSS/DSCN MKR DOCD: CPT | Performed by: NURSE PRACTITIONER

## 2024-09-06 PROCEDURE — 99214 OFFICE O/P EST MOD 30 MIN: CPT | Performed by: NURSE PRACTITIONER

## 2024-09-06 ASSESSMENT — ENCOUNTER SYMPTOMS
GASTROINTESTINAL NEGATIVE: 1
EYES NEGATIVE: 1
RESPIRATORY NEGATIVE: 1

## 2024-09-06 NOTE — PROGRESS NOTES
[facility]  [address] [place] [address]  Dept: [phone]  Dept Fax: [phone]  Loc: [phone]    Fernando Chu is a 79 y.o. male who presents today for his medical conditions/complaints as noted below.  Fernando Chu is c/o of Follow-up (Medication)      Chief Complaint   Patient presents with    Follow-up     Medication       HPI:     HPI  Patient presents today for medication follow up.  Hydroxyzine was increased to TID.  He states that he can tell a little bit of a difference. He did work on his shrubs 3 days ago and is still really tired from this.    Patient did follow-up with cardiology yesterday and is scheduled to have heart cath .  They started him on Imdur, but has not started this medication yet.      Past Medical History:   Diagnosis Date    Cancer (HCC)     colon    Hyperlipidemia     Port-A-Cath in place     Mediport        Past Surgical History:   Procedure Laterality Date    COLON SURGERY  2010    resection [place]    COLONOSCOPY  2013    Dr. Odonnell    COLONOSCOPY  2016    Dr. Odonnell    FRACTURE SURGERY      left foot    HERNIA REPAIR  11/15/2004    Dr. Valdez       Social History     Tobacco Use    Smoking status: Former     Current packs/day: 0.00     Types: Cigarettes     Quit date: 1990     Years since quittin.7    Smokeless tobacco: Never   Substance Use Topics    Alcohol use: Yes     Alcohol/week: 1.0 standard drink of alcohol     Types: 1 Glasses of wine per week        Current Outpatient Medications   Medication Sig Dispense Refill    isosorbide mononitrate (IMDUR) 30 MG extended release tablet Take 1 tablet by mouth daily 30 tablet 3    hydrOXYzine HCl (ATARAX) 25 MG tablet Take 1 tablet by mouth in the morning, at noon, and at bedtime 90 tablet 0    levocetirizine (XYZAL ALLERGY 24HR) 5 MG tablet Take 1 tablet by mouth nightly 90 tablet 3    omeprazole (PRILOSEC) 20 MG delayed

## 2024-09-19 ENCOUNTER — HOSPITAL ENCOUNTER (OUTPATIENT)
Age: 79
Setting detail: OUTPATIENT SURGERY
Discharge: HOME | End: 2024-09-19
Attending: INTERNAL MEDICINE | Admitting: INTERNAL MEDICINE
Payer: MEDICARE

## 2024-09-19 VITALS
SYSTOLIC BLOOD PRESSURE: 125 MMHG | WEIGHT: 173 LBS | RESPIRATION RATE: 20 BRPM | HEART RATE: 82 BPM | TEMPERATURE: 97 F | HEIGHT: 70 IN | DIASTOLIC BLOOD PRESSURE: 66 MMHG | OXYGEN SATURATION: 98 % | BODY MASS INDEX: 24.77 KG/M2

## 2024-09-19 DIAGNOSIS — K29.80 DUODENITIS: ICD-10-CM

## 2024-09-19 DIAGNOSIS — I20.89 STABLE ANGINA (HCC): Primary | ICD-10-CM

## 2024-09-19 DIAGNOSIS — R94.39 ABNORMAL CARDIOVASCULAR STRESS TEST: ICD-10-CM

## 2024-09-19 LAB — ECHO BSA: 1.97 M2

## 2024-09-19 PROCEDURE — C1769 GUIDE WIRE: HCPCS | Performed by: INTERNAL MEDICINE

## 2024-09-19 PROCEDURE — 99153 MOD SED SAME PHYS/QHP EA: CPT | Performed by: INTERNAL MEDICINE

## 2024-09-19 PROCEDURE — 2500000003 HC RX 250 WO HCPCS: Performed by: INTERNAL MEDICINE

## 2024-09-19 PROCEDURE — 6360000004 HC RX CONTRAST MEDICATION: Performed by: INTERNAL MEDICINE

## 2024-09-19 PROCEDURE — 93458 L HRT ARTERY/VENTRICLE ANGIO: CPT | Performed by: INTERNAL MEDICINE

## 2024-09-19 PROCEDURE — C1874 STENT, COATED/COV W/DEL SYS: HCPCS | Performed by: INTERNAL MEDICINE

## 2024-09-19 PROCEDURE — 92928 PRQ TCAT PLMT NTRAC ST 1 LES: CPT | Performed by: INTERNAL MEDICINE

## 2024-09-19 PROCEDURE — 2580000003 HC RX 258: Performed by: INTERNAL MEDICINE

## 2024-09-19 PROCEDURE — C1725 CATH, TRANSLUMIN NON-LASER: HCPCS | Performed by: INTERNAL MEDICINE

## 2024-09-19 PROCEDURE — 7100000010 HC PHASE II RECOVERY - FIRST 15 MIN: Performed by: INTERNAL MEDICINE

## 2024-09-19 PROCEDURE — C9600 PERC DRUG-EL COR STENT SING: HCPCS | Performed by: INTERNAL MEDICINE

## 2024-09-19 PROCEDURE — C1887 CATHETER, GUIDING: HCPCS | Performed by: INTERNAL MEDICINE

## 2024-09-19 PROCEDURE — 7100000011 HC PHASE II RECOVERY - ADDTL 15 MIN: Performed by: INTERNAL MEDICINE

## 2024-09-19 PROCEDURE — 6370000000 HC RX 637 (ALT 250 FOR IP): Performed by: INTERNAL MEDICINE

## 2024-09-19 PROCEDURE — 99152 MOD SED SAME PHYS/QHP 5/>YRS: CPT | Performed by: INTERNAL MEDICINE

## 2024-09-19 PROCEDURE — 6360000002 HC RX W HCPCS: Performed by: INTERNAL MEDICINE

## 2024-09-19 PROCEDURE — C1894 INTRO/SHEATH, NON-LASER: HCPCS | Performed by: INTERNAL MEDICINE

## 2024-09-19 PROCEDURE — 2709999900 HC NON-CHARGEABLE SUPPLY: Performed by: INTERNAL MEDICINE

## 2024-09-19 DEVICE — STENT ONYXNG35038UX ONYX 3.50X38RX
Type: IMPLANTABLE DEVICE | Status: FUNCTIONAL
Brand: ONYX FRONTIER™

## 2024-09-19 DEVICE — STENT ONYXNG22522UX ONYX 2.25X22RX
Type: IMPLANTABLE DEVICE | Status: FUNCTIONAL
Brand: ONYX FRONTIER™

## 2024-09-19 DEVICE — STENT ONYXNG25038UX ONYX 2.50X38RX
Type: IMPLANTABLE DEVICE | Status: FUNCTIONAL
Brand: ONYX FRONTIER™

## 2024-09-19 RX ORDER — ATORVASTATIN CALCIUM 20 MG/1
20 TABLET, FILM COATED ORAL NIGHTLY
Qty: 90 TABLET | Refills: 3 | Status: SHIPPED | OUTPATIENT
Start: 2024-09-19

## 2024-09-19 RX ORDER — SODIUM CHLORIDE 9 MG/ML
INJECTION, SOLUTION INTRAVENOUS CONTINUOUS
Status: DISCONTINUED | OUTPATIENT
Start: 2024-09-19 | End: 2024-09-19 | Stop reason: HOSPADM

## 2024-09-19 RX ORDER — MIDAZOLAM HYDROCHLORIDE 1 MG/ML
INJECTION INTRAMUSCULAR; INTRAVENOUS PRN
Status: DISCONTINUED | OUTPATIENT
Start: 2024-09-19 | End: 2024-09-19 | Stop reason: HOSPADM

## 2024-09-19 RX ORDER — ATORVASTATIN CALCIUM 20 MG/1
20 TABLET, FILM COATED ORAL NIGHTLY
Status: DISCONTINUED | OUTPATIENT
Start: 2024-09-19 | End: 2024-09-19 | Stop reason: HOSPADM

## 2024-09-19 RX ORDER — IOPAMIDOL 612 MG/ML
INJECTION, SOLUTION INTRAVASCULAR PRN
Status: DISCONTINUED | OUTPATIENT
Start: 2024-09-19 | End: 2024-09-19 | Stop reason: HOSPADM

## 2024-09-19 RX ORDER — ACETAMINOPHEN 325 MG/1
650 TABLET ORAL EVERY 4 HOURS PRN
Status: DISCONTINUED | OUTPATIENT
Start: 2024-09-19 | End: 2024-09-19 | Stop reason: HOSPADM

## 2024-09-19 RX ORDER — BIVALIRUDIN 250 MG/5ML
INJECTION, POWDER, LYOPHILIZED, FOR SOLUTION INTRAVENOUS PRN
Status: DISCONTINUED | OUTPATIENT
Start: 2024-09-19 | End: 2024-09-19 | Stop reason: HOSPADM

## 2024-09-19 RX ORDER — NITROGLYCERIN 0.4 MG/1
0.4 TABLET SUBLINGUAL EVERY 5 MIN PRN
Status: DISCONTINUED | OUTPATIENT
Start: 2024-09-19 | End: 2024-09-19 | Stop reason: HOSPADM

## 2024-09-19 RX ORDER — SODIUM CHLORIDE 9 MG/ML
INJECTION, SOLUTION INTRAVENOUS PRN
Status: DISCONTINUED | OUTPATIENT
Start: 2024-09-19 | End: 2024-09-19 | Stop reason: HOSPADM

## 2024-09-19 RX ORDER — FENTANYL CITRATE 50 UG/ML
INJECTION, SOLUTION INTRAMUSCULAR; INTRAVENOUS PRN
Status: DISCONTINUED | OUTPATIENT
Start: 2024-09-19 | End: 2024-09-19 | Stop reason: HOSPADM

## 2024-09-19 RX ORDER — HYDROCODONE BITARTRATE AND ACETAMINOPHEN 5; 325 MG/1; MG/1
2 TABLET ORAL EVERY 4 HOURS PRN
Status: DISCONTINUED | OUTPATIENT
Start: 2024-09-19 | End: 2024-09-19 | Stop reason: HOSPADM

## 2024-09-19 RX ORDER — SODIUM CHLORIDE 0.9 % (FLUSH) 0.9 %
5-40 SYRINGE (ML) INJECTION PRN
Status: DISCONTINUED | OUTPATIENT
Start: 2024-09-19 | End: 2024-09-19 | Stop reason: HOSPADM

## 2024-09-19 RX ORDER — SODIUM CHLORIDE 0.9 % (FLUSH) 0.9 %
5-40 SYRINGE (ML) INJECTION EVERY 12 HOURS SCHEDULED
Status: DISCONTINUED | OUTPATIENT
Start: 2024-09-19 | End: 2024-09-19 | Stop reason: HOSPADM

## 2024-09-19 RX ORDER — CLOPIDOGREL BISULFATE 75 MG/1
75 TABLET ORAL DAILY
Status: DISCONTINUED | OUTPATIENT
Start: 2024-09-20 | End: 2024-09-19 | Stop reason: HOSPADM

## 2024-09-19 RX ORDER — CLOPIDOGREL BISULFATE 75 MG/1
75 TABLET ORAL DAILY
Qty: 90 TABLET | Refills: 3 | Status: SHIPPED | OUTPATIENT
Start: 2024-09-20

## 2024-09-19 RX ORDER — NITROGLYCERIN 20 MG/100ML
INJECTION INTRAVENOUS PRN
Status: DISCONTINUED | OUTPATIENT
Start: 2024-09-19 | End: 2024-09-19 | Stop reason: HOSPADM

## 2024-09-19 RX ORDER — HYDROCODONE BITARTRATE AND ACETAMINOPHEN 5; 325 MG/1; MG/1
1 TABLET ORAL EVERY 4 HOURS PRN
Status: DISCONTINUED | OUTPATIENT
Start: 2024-09-19 | End: 2024-09-19 | Stop reason: HOSPADM

## 2024-09-19 RX ORDER — CLOPIDOGREL 300 MG/1
TABLET, FILM COATED ORAL PRN
Status: DISCONTINUED | OUTPATIENT
Start: 2024-09-19 | End: 2024-09-19 | Stop reason: HOSPADM

## 2024-09-19 RX ORDER — ONDANSETRON 2 MG/ML
4 INJECTION INTRAMUSCULAR; INTRAVENOUS EVERY 6 HOURS PRN
Status: DISCONTINUED | OUTPATIENT
Start: 2024-09-19 | End: 2024-09-19 | Stop reason: HOSPADM

## 2024-09-19 RX ORDER — ASPIRIN 325 MG
325 TABLET ORAL ONCE
Status: COMPLETED | OUTPATIENT
Start: 2024-09-19 | End: 2024-09-19

## 2024-09-19 RX ORDER — HEPARIN SODIUM 1000 [USP'U]/ML
INJECTION, SOLUTION INTRAVENOUS; SUBCUTANEOUS PRN
Status: DISCONTINUED | OUTPATIENT
Start: 2024-09-19 | End: 2024-09-19 | Stop reason: HOSPADM

## 2024-09-19 RX ADMIN — SODIUM CHLORIDE: 9 INJECTION, SOLUTION INTRAVENOUS at 08:13

## 2024-09-19 RX ADMIN — ASPIRIN 325 MG: 325 TABLET ORAL at 08:12

## 2024-09-20 ENCOUNTER — TELEPHONE (OUTPATIENT)
Dept: FAMILY MEDICINE CLINIC | Age: 79
End: 2024-09-20

## 2024-09-24 ENCOUNTER — TELEPHONE (OUTPATIENT)
Dept: CARDIOLOGY CLINIC | Age: 79
End: 2024-09-24

## 2024-09-24 DIAGNOSIS — R53.83 DECREASED STAMINA: ICD-10-CM

## 2024-09-24 DIAGNOSIS — R53.83 OTHER FATIGUE: ICD-10-CM

## 2024-09-24 DIAGNOSIS — R94.39 ABNORMAL CARDIOVASCULAR STRESS TEST: Primary | ICD-10-CM

## 2024-09-24 DIAGNOSIS — R06.02 SHORTNESS OF BREATH: ICD-10-CM

## 2024-09-24 DIAGNOSIS — I20.89 STABLE ANGINA (HCC): ICD-10-CM

## 2024-09-24 DIAGNOSIS — R06.02 SOB (SHORTNESS OF BREATH): ICD-10-CM

## 2024-10-04 ENCOUNTER — OFFICE VISIT (OUTPATIENT)
Dept: FAMILY MEDICINE CLINIC | Age: 79
End: 2024-10-04
Payer: MEDICARE

## 2024-10-04 VITALS
SYSTOLIC BLOOD PRESSURE: 124 MMHG | HEART RATE: 70 BPM | DIASTOLIC BLOOD PRESSURE: 80 MMHG | HEIGHT: 70 IN | WEIGHT: 159 LBS | TEMPERATURE: 97 F | OXYGEN SATURATION: 97 % | BODY MASS INDEX: 22.76 KG/M2

## 2024-10-04 DIAGNOSIS — F32.0 CURRENT MILD EPISODE OF MAJOR DEPRESSIVE DISORDER WITHOUT PRIOR EPISODE (HCC): Primary | ICD-10-CM

## 2024-10-04 DIAGNOSIS — R63.4 WEIGHT LOSS: ICD-10-CM

## 2024-10-04 PROCEDURE — 99214 OFFICE O/P EST MOD 30 MIN: CPT | Performed by: NURSE PRACTITIONER

## 2024-10-04 PROCEDURE — 1123F ACP DISCUSS/DSCN MKR DOCD: CPT | Performed by: NURSE PRACTITIONER

## 2024-10-04 RX ORDER — PAROXETINE 10 MG/1
10 TABLET, FILM COATED ORAL DAILY
Qty: 30 TABLET | Refills: 3 | Status: SHIPPED | OUTPATIENT
Start: 2024-10-04

## 2024-10-04 ASSESSMENT — ENCOUNTER SYMPTOMS
GASTROINTESTINAL NEGATIVE: 1
RESPIRATORY NEGATIVE: 1
EYES NEGATIVE: 1

## 2024-10-04 NOTE — PROGRESS NOTES
[facility]  [address] [place] [address]  Dept: [phone]  Dept Fax: [phone]  Loc: [phone]    Fernando Chu is a 79 y.o. male who presents today for his medical conditions/complaints as noted below.  Fernando Chu is c/o of Follow-up (Anxiety/depression)      Chief Complaint   Patient presents with    Follow-up     Anxiety/depression       HPI:     HPI  Patient presents today for follow up of anxiety and depression.  He states that he is doing \"not too good\" he reports that he has lost 40lbs. last visit in office patient did weigh 173 pounds.  Today he does weigh 159 lbs.  Patient does have another cardiac cath scheduled in 6 days.    Past Medical History:   Diagnosis Date    Cancer (HCC) 2011    colon    Hyperlipidemia     Pancreatitis     Port-A-Cath in place     Mediport. has been removed        Past Surgical History:   Procedure Laterality Date    CARDIAC PROCEDURE N/A 2024    Left heart cath / coronary angiography performed by Ronan Carr MD at [facility] CARDIAC CATH LAB    CARDIAC PROCEDURE N/A 2024    Percutaneous coronary intervention performed by Ronan Carr MD at [facility] CARDIAC CATH LAB    COLON SURGERY  2010    resection [place]    COLONOSCOPY  2013    Dr. Odonnell    COLONOSCOPY  2016    Dr. Odonnell    FRACTURE SURGERY  1990    left foot    HERNIA REPAIR  11/15/2004    Dr. Valdez    OTHER SURGICAL HISTORY      umbilical surgery when he was 11       Social History     Tobacco Use    Smoking status: Former     Current packs/day: 0.00     Types: Cigarettes     Quit date: 1990     Years since quittin.7    Smokeless tobacco: Never   Substance Use Topics    Alcohol use: Yes     Alcohol/week: 1.0 standard drink of alcohol     Types: 1 Glasses of wine per week        Current Outpatient Medications   Medication Sig Dispense Refill    PARoxetine (PAXIL) 10 MG tablet Take 1 tablet by mouth daily 30 tablet 3

## 2024-10-06 NOTE — H&P
Patient:  Fernando Chu                  1945  MRN: [id number]    PROBLEM LIST:    Patient Active Problem List    Diagnosis Date Noted    Port-A-Cath in place 08/16/2022     Priority: Medium     Overview Note:     Mediport      SOB (shortness of breath) 09/24/2024     Priority: Low    Stable angina (HCC) 09/24/2024     Priority: Low    Abnormal cardiovascular stress test 09/05/2024     Priority: Low    Adenocarcinoma of colon (HCC) 02/23/2020     Priority: Low    Urgency of urination 11/02/2016     Priority: Low    Hydrocele 11/02/2016     Priority: Low    Elevated PSA 11/02/2016     Priority: Low    Benign prostatic hyperplasia with urinary obstruction 02/15/2016     Priority: Low    Other and unspecified hyperlipidemia 10/24/2014     Priority: Low    Colon cancer (HCC) 10/24/2014     Priority: Low       PRESENTATION: Fernando Chu is a 79 y.o. year old male who presents with complaints of fatigue and exertional shortness of breath with an abnormal dobutamine stress echo performed 8/19/2024 with 1.5 mm ST depressions in inferolateral leads, being referred for cardiac catheterization.  Patient evaluated by nurse practitioner.  Patient with history of hypertension, hyperlipidemia, prior colon cancer treated in 2011.  Wife with dementia and he just placed her in the nursing home in July.  Following this he had had a bout of pancreatitis and is recovering from this.  Exertional fatigue with shortness of breath but no chest pain reported.  Patient underwent cardiac catheterization 9/19/2024, revealing triple-vessel disease, opting for multivessel PCI due to the circumstances, undergoing PCI to ramus intermedius, proximal LAD and mid to distal LAD, now returning for staged PCI to RCA.    REVIEW OF SYSTEMS:  Review of Systems   Constitutional:  Positive for fatigue. Negative for activity change and diaphoresis.   HENT:  Negative for hearing loss, nosebleeds and tinnitus.    Eyes:  Negative for visual disturbance.

## 2024-10-10 ENCOUNTER — HOSPITAL ENCOUNTER (OUTPATIENT)
Age: 79
Setting detail: OUTPATIENT SURGERY
Discharge: HOME | End: 2024-10-10
Attending: INTERNAL MEDICINE | Admitting: INTERNAL MEDICINE
Payer: MEDICARE

## 2024-10-10 VITALS
DIASTOLIC BLOOD PRESSURE: 76 MMHG | TEMPERATURE: 97 F | SYSTOLIC BLOOD PRESSURE: 104 MMHG | RESPIRATION RATE: 20 BRPM | HEART RATE: 73 BPM | OXYGEN SATURATION: 98 %

## 2024-10-10 DIAGNOSIS — I20.89 STABLE ANGINA (HCC): ICD-10-CM

## 2024-10-10 DIAGNOSIS — R94.39 ABNORMAL CARDIOVASCULAR STRESS TEST: ICD-10-CM

## 2024-10-10 DIAGNOSIS — R06.02 SOB (SHORTNESS OF BREATH): ICD-10-CM

## 2024-10-10 LAB
ANION GAP SERPL CALCULATED.3IONS-SCNC: 15 MMOL/L (ref 7–19)
BUN SERPL-MCNC: 27 MG/DL (ref 8–23)
CALCIUM SERPL-MCNC: 9.4 MG/DL (ref 8.8–10.2)
CHLORIDE SERPL-SCNC: 100 MMOL/L (ref 98–111)
CO2 SERPL-SCNC: 21 MMOL/L (ref 22–29)
CREAT SERPL-MCNC: 0.7 MG/DL (ref 0.7–1.2)
EKG P AXIS: 0 DEGREES
EKG P AXIS: 32 DEGREES
EKG P-R INTERVAL: 154 MS
EKG P-R INTERVAL: 154 MS
EKG Q-T INTERVAL: 440 MS
EKG Q-T INTERVAL: 454 MS
EKG QRS DURATION: 104 MS
EKG QRS DURATION: 98 MS
EKG QTC CALCULATION (BAZETT): 443 MS
EKG QTC CALCULATION (BAZETT): 455 MS
EKG T AXIS: 23 DEGREES
EKG T AXIS: 46 DEGREES
ERYTHROCYTE [DISTWIDTH] IN BLOOD BY AUTOMATED COUNT: 13.7 % (ref 11.5–14.5)
GLUCOSE SERPL-MCNC: 103 MG/DL (ref 70–99)
HCT VFR BLD AUTO: 31.9 % (ref 42–52)
HGB BLD-MCNC: 10.1 G/DL (ref 14–18)
MCH RBC QN AUTO: 29.8 PG (ref 27–31)
MCHC RBC AUTO-ENTMCNC: 31.7 G/DL (ref 33–37)
MCV RBC AUTO: 94.1 FL (ref 80–94)
PLATELET # BLD AUTO: 396 K/UL (ref 130–400)
PMV BLD AUTO: 9.3 FL (ref 9.4–12.4)
POTASSIUM SERPL-SCNC: 3.9 MMOL/L (ref 3.5–5)
RBC # BLD AUTO: 3.39 M/UL (ref 4.7–6.1)
SODIUM SERPL-SCNC: 136 MMOL/L (ref 136–145)
WBC # BLD AUTO: 11.3 K/UL (ref 4.8–10.8)

## 2024-10-10 PROCEDURE — 2709999900 HC NON-CHARGEABLE SUPPLY: Performed by: INTERNAL MEDICINE

## 2024-10-10 PROCEDURE — C1725 CATH, TRANSLUMIN NON-LASER: HCPCS | Performed by: INTERNAL MEDICINE

## 2024-10-10 PROCEDURE — 80048 BASIC METABOLIC PNL TOTAL CA: CPT

## 2024-10-10 PROCEDURE — C1887 CATHETER, GUIDING: HCPCS | Performed by: INTERNAL MEDICINE

## 2024-10-10 PROCEDURE — 6370000000 HC RX 637 (ALT 250 FOR IP): Performed by: INTERNAL MEDICINE

## 2024-10-10 PROCEDURE — 92928 PRQ TCAT PLMT NTRAC ST 1 LES: CPT | Performed by: INTERNAL MEDICINE

## 2024-10-10 PROCEDURE — 7100000010 HC PHASE II RECOVERY - FIRST 15 MIN: Performed by: INTERNAL MEDICINE

## 2024-10-10 PROCEDURE — 2580000003 HC RX 258: Performed by: INTERNAL MEDICINE

## 2024-10-10 PROCEDURE — C1894 INTRO/SHEATH, NON-LASER: HCPCS | Performed by: INTERNAL MEDICINE

## 2024-10-10 PROCEDURE — 36415 COLL VENOUS BLD VENIPUNCTURE: CPT

## 2024-10-10 PROCEDURE — 93454 CORONARY ARTERY ANGIO S&I: CPT

## 2024-10-10 PROCEDURE — 85027 COMPLETE CBC AUTOMATED: CPT

## 2024-10-10 PROCEDURE — C1769 GUIDE WIRE: HCPCS | Performed by: INTERNAL MEDICINE

## 2024-10-10 PROCEDURE — 99152 MOD SED SAME PHYS/QHP 5/>YRS: CPT | Performed by: INTERNAL MEDICINE

## 2024-10-10 PROCEDURE — C9600 PERC DRUG-EL COR STENT SING: HCPCS | Performed by: INTERNAL MEDICINE

## 2024-10-10 PROCEDURE — C1874 STENT, COATED/COV W/DEL SYS: HCPCS | Performed by: INTERNAL MEDICINE

## 2024-10-10 PROCEDURE — 2500000003 HC RX 250 WO HCPCS: Performed by: INTERNAL MEDICINE

## 2024-10-10 PROCEDURE — 99153 MOD SED SAME PHYS/QHP EA: CPT | Performed by: INTERNAL MEDICINE

## 2024-10-10 PROCEDURE — 7100000011 HC PHASE II RECOVERY - ADDTL 15 MIN: Performed by: INTERNAL MEDICINE

## 2024-10-10 PROCEDURE — 6360000002 HC RX W HCPCS: Performed by: INTERNAL MEDICINE

## 2024-10-10 DEVICE — STENT ONYXNG40034UX ONYX 4.00X34RX
Type: IMPLANTABLE DEVICE | Status: FUNCTIONAL
Brand: ONYX FRONTIER™

## 2024-10-10 RX ORDER — ONDANSETRON 2 MG/ML
4 INJECTION INTRAMUSCULAR; INTRAVENOUS EVERY 6 HOURS PRN
Status: DISCONTINUED | OUTPATIENT
Start: 2024-10-10 | End: 2024-10-10 | Stop reason: HOSPADM

## 2024-10-10 RX ORDER — IODIXANOL 270 MG/ML
INJECTION, SOLUTION INTRAVASCULAR PRN
Status: DISCONTINUED | OUTPATIENT
Start: 2024-10-10 | End: 2024-10-10 | Stop reason: HOSPADM

## 2024-10-10 RX ORDER — NITROGLYCERIN 20 MG/100ML
INJECTION INTRAVENOUS PRN
Status: DISCONTINUED | OUTPATIENT
Start: 2024-10-10 | End: 2024-10-10 | Stop reason: HOSPADM

## 2024-10-10 RX ORDER — MIDAZOLAM HYDROCHLORIDE 1 MG/ML
INJECTION INTRAMUSCULAR; INTRAVENOUS PRN
Status: DISCONTINUED | OUTPATIENT
Start: 2024-10-10 | End: 2024-10-10 | Stop reason: HOSPADM

## 2024-10-10 RX ORDER — BIVALIRUDIN 250 MG/5ML
INJECTION, POWDER, LYOPHILIZED, FOR SOLUTION INTRAVENOUS PRN
Status: DISCONTINUED | OUTPATIENT
Start: 2024-10-10 | End: 2024-10-10 | Stop reason: HOSPADM

## 2024-10-10 RX ORDER — SODIUM CHLORIDE 0.9 % (FLUSH) 0.9 %
5-40 SYRINGE (ML) INJECTION EVERY 12 HOURS SCHEDULED
Status: DISCONTINUED | OUTPATIENT
Start: 2024-10-10 | End: 2024-10-10 | Stop reason: HOSPADM

## 2024-10-10 RX ORDER — ASPIRIN 325 MG
325 TABLET ORAL ONCE
Status: DISCONTINUED | OUTPATIENT
Start: 2024-10-10 | End: 2024-10-10 | Stop reason: HOSPADM

## 2024-10-10 RX ORDER — HYDROCODONE BITARTRATE AND ACETAMINOPHEN 5; 325 MG/1; MG/1
1 TABLET ORAL EVERY 4 HOURS PRN
Status: DISCONTINUED | OUTPATIENT
Start: 2024-10-10 | End: 2024-10-10 | Stop reason: HOSPADM

## 2024-10-10 RX ORDER — SODIUM CHLORIDE 9 MG/ML
INJECTION, SOLUTION INTRAVENOUS CONTINUOUS
Status: DISCONTINUED | OUTPATIENT
Start: 2024-10-10 | End: 2024-10-10 | Stop reason: HOSPADM

## 2024-10-10 RX ORDER — FENTANYL CITRATE 50 UG/ML
INJECTION, SOLUTION INTRAMUSCULAR; INTRAVENOUS PRN
Status: DISCONTINUED | OUTPATIENT
Start: 2024-10-10 | End: 2024-10-10 | Stop reason: HOSPADM

## 2024-10-10 RX ORDER — HEPARIN SODIUM 1000 [USP'U]/ML
INJECTION, SOLUTION INTRAVENOUS; SUBCUTANEOUS PRN
Status: DISCONTINUED | OUTPATIENT
Start: 2024-10-10 | End: 2024-10-10 | Stop reason: HOSPADM

## 2024-10-10 RX ORDER — ACETAMINOPHEN 325 MG/1
650 TABLET ORAL EVERY 4 HOURS PRN
Status: DISCONTINUED | OUTPATIENT
Start: 2024-10-10 | End: 2024-10-10 | Stop reason: HOSPADM

## 2024-10-10 RX ORDER — SODIUM CHLORIDE 9 MG/ML
INJECTION, SOLUTION INTRAVENOUS PRN
Status: DISCONTINUED | OUTPATIENT
Start: 2024-10-10 | End: 2024-10-10 | Stop reason: HOSPADM

## 2024-10-10 RX ORDER — HYDROCODONE BITARTRATE AND ACETAMINOPHEN 5; 325 MG/1; MG/1
2 TABLET ORAL EVERY 4 HOURS PRN
Status: DISCONTINUED | OUTPATIENT
Start: 2024-10-10 | End: 2024-10-10 | Stop reason: HOSPADM

## 2024-10-10 RX ORDER — CLOPIDOGREL BISULFATE 75 MG/1
TABLET ORAL PRN
Status: DISCONTINUED | OUTPATIENT
Start: 2024-10-10 | End: 2024-10-10 | Stop reason: HOSPADM

## 2024-10-10 RX ORDER — SODIUM CHLORIDE 0.9 % (FLUSH) 0.9 %
5-40 SYRINGE (ML) INJECTION PRN
Status: DISCONTINUED | OUTPATIENT
Start: 2024-10-10 | End: 2024-10-10 | Stop reason: HOSPADM

## 2024-10-10 RX ORDER — NITROGLYCERIN 0.4 MG/1
0.4 TABLET SUBLINGUAL EVERY 5 MIN PRN
Status: DISCONTINUED | OUTPATIENT
Start: 2024-10-10 | End: 2024-10-10 | Stop reason: HOSPADM

## 2024-10-10 RX ADMIN — SODIUM CHLORIDE: 9 INJECTION, SOLUTION INTRAVENOUS at 09:15

## 2024-10-10 NOTE — DISCHARGE INSTRUCTIONS
PATIENT INSTRUCTIONS- POST RADIAL CARDIAC CATH/ANGIOPLASTY      Do not subject hand/arm to any forceful movements for 24 hours (i.e. Supporting weight) when rising from a chair or bed.       For 2 days following discharge:  Do Not  Operate a motor vehicle, tractor, lawnmower, motorcycle or all-terrain vehicle.  Do Not  Lift anything heavier than 1 pound with affected arm.  Avoid  Excessive (extension/flexion) wrist movement.      Avoid heavy lifting with affected arm for 3 days following discharge.      Do Not engage in vigorous exercise (i.e. Tennis, ect.) using affected arm for 5 days following discharge.     If bleeding should occur while in the hospital, apply firm pressure to the site an call your nurse.     If bleeding should occur following discharge:  Sit down and apply firm pressure to site with your fingers x 10 mins.  If the bleeding stops, continue to sit quietly, keeping your wrist straight for 2 hours. Notify your physician as soon as possible.  If bleeding DOES NOT STOP after 10 mins or if there is a large amount of bleeding or spurting , CALL 911 immediately. DO NOT DRIVE YOURSELF TO THE HOSPITAL.     Remove bandage 24 hours following application and replace with a band aid.     You may shower on the day following your procedure. Do not take a tub bath for 3 days following discharge. Do not submerge arm in dishwater or other water sources for 5 days. Your Cardiologist has referred you for participation in Cardiac Rehabilitation and a consult has been sent to the [facility] Cardiac Rehab program.     You are to contact [facility] Cardiac & Pulmonary Rehab one week AFTER hospital discharge to schedule your \"orientation & assessment\" appointment by calling direct at [phone].

## 2024-10-10 NOTE — PROGRESS NOTES
Pt ambulated without complication. R radial site cdi with no s/s of bleeding or hematoma after ambulating, pt with no c/o pain. Discharge instructions reviewed with patient and patient states understanding. Patient discharged from cath holding with all belongings and AVS. Stent card and cardiac rehab brochure given to pt. Pt assisted to front entrance via wheelchair. Pt's daughter plans to drive pt home. Electronically signed by Gem Gonzalez RN on 10/10/2024 at 4:58 PM

## 2024-10-10 NOTE — PROGRESS NOTES
Pt wishes to do cardia rehab at [facility].  Tiana Matos MA with DR Carr notified and stated she would send the order to [facility]. Electronically signed by Gem Gonzalez RN on 10/10/2024 at 3:00 PM

## 2024-10-10 NOTE — PROCEDURES
PROCEDURE NOTE  Date: 10/10/2024   Name: Fernando Chu  YOB: 1945    Procedures    Successful staged PCI of ostial to proximal RCA with ALAN x 1.    Plan: Medical management.  Plavix uninterrupted for 6 months.  Patient advised on nutritional support, activity and depression management.

## 2024-10-11 NOTE — CONSULTS
Cardiac Rehab MI/PTCA/Stent education packet was sent to the patient's address on record.  Handouts titled; \"Home Instructions Following a Cardiac Event\", \"A Healthy Heart: Care Instructions\",  \"Cardiac Diet/Low Cholesterol\", \"Cardiac Rehabilitation: An Individualized Supervised Program For You\" and a [facility] Cardiac & Pulmonary Rehabilitation brochure were included.  Patient was instructed to contact [facility] or the hospital nearest their residence for the opportunity to enroll in Phase II Outpatient Cardiac Rehab.

## 2024-10-14 DIAGNOSIS — Z95.5 STATUS POST CORONARY ARTERY STENT PLACEMENT: Primary | ICD-10-CM

## 2024-10-16 DIAGNOSIS — F41.9 ANXIETY: ICD-10-CM

## 2024-10-16 RX ORDER — HYDROXYZINE HYDROCHLORIDE 25 MG/1
25 TABLET, FILM COATED ORAL 3 TIMES DAILY
Qty: 90 TABLET | Refills: 0 | Status: SHIPPED | OUTPATIENT
Start: 2024-10-16

## 2024-10-16 NOTE — TELEPHONE ENCOUNTER
Received fax from pharmacy requesting refill on pts medication(s). Pt was last seen in office on 10/4/2024  and has a follow up scheduled for 11/1/2024. Will send request to  Misty Blas  for authorization.     Requested Prescriptions     Pending Prescriptions Disp Refills    hydrOXYzine HCl (ATARAX) 25 MG tablet [Pharmacy Med Name: hydroxyzine HCl 25 mg tablet] 90 tablet 0     Sig: TAKE ONE TABLET BY MOUTH THREE TIMES DAILY

## 2024-10-18 ENCOUNTER — TELEPHONE (OUTPATIENT)
Dept: FAMILY MEDICINE CLINIC | Age: 79
End: 2024-10-18

## 2024-10-18 NOTE — TELEPHONE ENCOUNTER
Patient is calling in stating that his blood pressure is running low, 85/50 and would like to know what you would like for him to do??

## 2024-10-18 NOTE — TELEPHONE ENCOUNTER
Spoke with Lexis SUAZO, she suggest increasing sodium. If BP still low in AM hold lisinopril. Keep BP log.

## 2024-10-18 NOTE — TELEPHONE ENCOUNTER
Pt daughter aware and voiced understanding. Drink Gatorade today, check BP before taking meds, hold lisinopril if still low. Keep BP log. Check back in with us on Monday. Advised ER in case of emergency.

## 2024-10-23 ENCOUNTER — TELEPHONE (OUTPATIENT)
Dept: FAMILY MEDICINE CLINIC | Age: 79
End: 2024-10-23

## 2024-10-23 ENCOUNTER — OFFICE VISIT (OUTPATIENT)
Dept: FAMILY MEDICINE CLINIC | Age: 79
End: 2024-10-23

## 2024-10-23 VITALS
BODY MASS INDEX: 22.19 KG/M2 | HEIGHT: 70 IN | DIASTOLIC BLOOD PRESSURE: 60 MMHG | TEMPERATURE: 97.3 F | HEART RATE: 62 BPM | WEIGHT: 155 LBS | OXYGEN SATURATION: 98 % | SYSTOLIC BLOOD PRESSURE: 102 MMHG

## 2024-10-23 DIAGNOSIS — N43.3 HYDROCELE, UNSPECIFIED HYDROCELE TYPE: Primary | ICD-10-CM

## 2024-10-23 DIAGNOSIS — R68.2 DRY MOUTH: ICD-10-CM

## 2024-10-23 DIAGNOSIS — F32.A ANXIETY AND DEPRESSION: ICD-10-CM

## 2024-10-23 DIAGNOSIS — R79.89 ELEVATED FERRITIN LEVEL: Primary | ICD-10-CM

## 2024-10-23 DIAGNOSIS — N43.3 HYDROCELE, UNSPECIFIED HYDROCELE TYPE: ICD-10-CM

## 2024-10-23 DIAGNOSIS — F41.9 ANXIETY AND DEPRESSION: ICD-10-CM

## 2024-10-23 DIAGNOSIS — I95.2 HYPOTENSION DUE TO DRUGS: Primary | ICD-10-CM

## 2024-10-23 DIAGNOSIS — I20.89 STABLE ANGINA (HCC): ICD-10-CM

## 2024-10-23 DIAGNOSIS — D64.9 ANEMIA, UNSPECIFIED TYPE: ICD-10-CM

## 2024-10-23 DIAGNOSIS — R63.4 WEIGHT LOSS: ICD-10-CM

## 2024-10-23 DIAGNOSIS — I95.2 HYPOTENSION DUE TO DRUGS: ICD-10-CM

## 2024-10-23 DIAGNOSIS — C18.9 MALIGNANT NEOPLASM OF COLON, UNSPECIFIED PART OF COLON (HCC): ICD-10-CM

## 2024-10-23 LAB
ANION GAP SERPL CALCULATED.3IONS-SCNC: 13 MMOL/L (ref 7–19)
BASOPHILS # BLD: 0 K/UL (ref 0–0.2)
BASOPHILS NFR BLD: 0.3 % (ref 0–1)
BUN SERPL-MCNC: 22 MG/DL (ref 8–23)
CALCIUM SERPL-MCNC: 9.5 MG/DL (ref 8.8–10.2)
CHLORIDE SERPL-SCNC: 101 MMOL/L (ref 98–111)
CO2 SERPL-SCNC: 24 MMOL/L (ref 22–29)
CREAT SERPL-MCNC: 0.7 MG/DL (ref 0.7–1.2)
EOSINOPHIL # BLD: 0.1 K/UL (ref 0–0.6)
EOSINOPHIL NFR BLD: 0.6 % (ref 0–5)
ERYTHROCYTE [DISTWIDTH] IN BLOOD BY AUTOMATED COUNT: 14.4 % (ref 11.5–14.5)
FERRITIN SERPL-MCNC: 1723 NG/ML (ref 30–400)
GLUCOSE SERPL-MCNC: 107 MG/DL (ref 70–99)
HCT VFR BLD AUTO: 30.1 % (ref 42–52)
HGB BLD-MCNC: 9.4 G/DL (ref 14–18)
IMM GRANULOCYTES # BLD: 0 K/UL
IRON SATN MFR SERPL: 13 % (ref 14–50)
IRON SERPL-MCNC: 19 UG/DL (ref 59–158)
LYMPHOCYTES # BLD: 1.4 K/UL (ref 1.1–4.5)
LYMPHOCYTES NFR BLD: 15.9 % (ref 20–40)
MCH RBC QN AUTO: 29.3 PG (ref 27–31)
MCHC RBC AUTO-ENTMCNC: 31.2 G/DL (ref 33–37)
MCV RBC AUTO: 93.8 FL (ref 80–94)
MONOCYTES # BLD: 0.7 K/UL (ref 0–0.9)
MONOCYTES NFR BLD: 7.7 % (ref 0–10)
NEUTROPHILS # BLD: 6.8 K/UL (ref 1.5–7.5)
NEUTS SEG NFR BLD: 75.1 % (ref 50–65)
PLATELET # BLD AUTO: 432 K/UL (ref 130–400)
PMV BLD AUTO: 9.2 FL (ref 9.4–12.4)
POTASSIUM SERPL-SCNC: 4.2 MMOL/L (ref 3.5–5)
RBC # BLD AUTO: 3.21 M/UL (ref 4.7–6.1)
SODIUM SERPL-SCNC: 138 MMOL/L (ref 136–145)
TIBC SERPL-MCNC: 142 UG/DL (ref 250–400)
WBC # BLD AUTO: 9.1 K/UL (ref 4.8–10.8)

## 2024-10-23 RX ORDER — FLUORIDE TOOTHPASTE
15 TOOTHPASTE DENTAL 3 TIMES DAILY PRN
Qty: 1000 ML | Refills: 1 | Status: SHIPPED | OUTPATIENT
Start: 2024-10-23

## 2024-10-23 ASSESSMENT — ENCOUNTER SYMPTOMS
GASTROINTESTINAL NEGATIVE: 1
RESPIRATORY NEGATIVE: 1
EYES NEGATIVE: 1

## 2024-10-23 NOTE — TELEPHONE ENCOUNTER
----- Message from LAKEISHA Garcia sent at 10/23/2024  4:10 PM CDT -----  Please let patient know that CMP did show normal renal function electrolytes.  CBC has decreased since last check from last cath.  See if lab can add on iron, folate, TIBC, ferritin

## 2024-10-23 NOTE — PROGRESS NOTES
[facility]  [address] [place] [address]  Dept: [phone]  Dept Fax: [phone]  Loc: [phone]    Fernando Chu is a 79 y.o. male who presents today for his medical conditions/complaints as noted below.  Fernando Chu is c/o of Follow-up (Medication/)      Chief Complaint   Patient presents with    Follow-up     Medication         HPI:     HPI  Patient presents today for medication follow up.  Patient was prescribed Paxil to see if this would help with his depression symptoms.  Patient states that he does not feel that this medication is helping much at all.  He states that he feels worse since taking the medication.  Patient did have stent placement less than 2 weeks ago.  Since then fatigue has slightly worsened due to hypotensive episodes.  He has been monitoring his blood pressure at home and on average it is 80/40 or 90/60.  He has been holding his lisinopril since Friday due to the lower blood pressure readings.  Even since holding lisinopril he is still had hypotensive episodes.  He has continued to take propranolol twice daily.    Patient continues to complain of weight loss, but does not eat due to inability to taste food regularly.  This occurred slightly after getting home from the hospital few months ago.  It is questionable if he had COVID at that time and no longer is able to taste food since then.    Past Medical History:   Diagnosis Date    Cancer (HCC) 01/01/2011    colon    Hyperlipidemia     Pancreatitis     Port-A-Cath in place     Mediport. has been removed        Past Surgical History:   Procedure Laterality Date    CARDIAC PROCEDURE N/A 9/19/2024    Left heart cath / coronary angiography performed by Ronan Carr MD at [facility] CARDIAC CATH LAB    CARDIAC PROCEDURE N/A 9/19/2024    Percutaneous coronary intervention performed by Ronan Crar MD at [facility] CARDIAC CATH LAB    CARDIAC PROCEDURE N/A 10/10/2024    Left heart cath / coronary

## 2024-10-24 ENCOUNTER — TELEPHONE (OUTPATIENT)
Dept: FAMILY MEDICINE CLINIC | Age: 79
End: 2024-10-24

## 2024-10-24 ENCOUNTER — OFFICE VISIT (OUTPATIENT)
Dept: CARDIOLOGY CLINIC | Age: 79
End: 2024-10-24

## 2024-10-24 VITALS
SYSTOLIC BLOOD PRESSURE: 100 MMHG | DIASTOLIC BLOOD PRESSURE: 60 MMHG | OXYGEN SATURATION: 96 % | BODY MASS INDEX: 22.33 KG/M2 | HEIGHT: 70 IN | WEIGHT: 156 LBS | HEART RATE: 103 BPM

## 2024-10-24 DIAGNOSIS — I25.10 CORONARY ARTERY DISEASE INVOLVING NATIVE CORONARY ARTERY OF NATIVE HEART WITHOUT ANGINA PECTORIS: Primary | ICD-10-CM

## 2024-10-24 DIAGNOSIS — I95.2 HYPOTENSION DUE TO DRUGS: ICD-10-CM

## 2024-10-24 RX ORDER — DONEPEZIL HYDROCHLORIDE 10 MG/1
10 TABLET, FILM COATED ORAL NIGHTLY
COMMUNITY

## 2024-10-24 RX ORDER — MEMANTINE HYDROCHLORIDE 5 MG/1
5 TABLET ORAL 2 TIMES DAILY
COMMUNITY

## 2024-10-24 RX ORDER — LISINOPRIL 10 MG/1
10 TABLET ORAL DAILY
COMMUNITY
End: 2024-10-24

## 2024-10-24 RX ORDER — CLONIDINE HYDROCHLORIDE 0.1 MG/1
TABLET ORAL
COMMUNITY
Start: 2024-07-26

## 2024-10-24 RX ORDER — PROPRANOLOL HYDROCHLORIDE 40 MG/1
40 TABLET ORAL 2 TIMES DAILY
COMMUNITY
End: 2024-10-24 | Stop reason: ALTCHOICE

## 2024-10-24 RX ORDER — DIPHENOXYLATE HYDROCHLORIDE AND ATROPINE SULFATE 2.5; .025 MG/1; MG/1
TABLET ORAL DAILY
COMMUNITY

## 2024-10-24 NOTE — PROGRESS NOTES
[facility]  [address] [address]  Phone: [phone]  Fax: [phone]    OFFICE VISIT:  10/24/2024    Fernando Chu - : 1945    Reason For Visit:  Fernando is a 79 y.o. male who is here for Follow-up (HFU   pt has fatigue no appetite) and Coronary Artery Disease  Patient was evaluated last month with ongoing fatigue and shortness of breath with ongoing stress due to being caregiver of his wife dementia who recently went to nursing home.  Patient had dobutamine stress echo that was positive for myocardial ischemia.  On 2024 patient underwent elective heart catheterization receiving drug-eluting stents to mid and distal LAD and proximal ramus  Staged procedure due to dye load underwent heart catheterization 10/10/2024 with successful ALAN to RCA    Patient had concerns of low blood pressure.  Primary care recommended holding lisinopril  Saw primary care yesterday.  Worsening depression.  Had him hold propranolol as well as lisinopril for now.    Had blood work showing worsening anemia with low iron  He returns today in follow-up  He has been participating in cardiac rehab at [facility].  He states there is blood pressures below also.  Just feels tired and weak.  He does his own medications.  Filled his boxes yesterday but did make sure he took out the blood pressure medication      Subjective  Fernando denies exertional chest pain, shortness of breath, orthopnea, paroxysmal nocturnal dyspnea, syncope, presyncope, arrhythmia, edema and fatigue.  The patient denies numbness or weakness to suggest cerebrovascular accident or transient ischemic attack.    Misty Blas APRN is PCP and follows labs.  Fernando Chu has the following history as recorded in [facility]:    Patient Active Problem List    Diagnosis Date Noted    Port-A-Cath in place 2022    SOB (shortness of breath) 2024    Stable angina (HCC) 2024    Abnormal cardiovascular stress

## 2024-10-24 NOTE — TELEPHONE ENCOUNTER
----- Message from LAKEISHA Garcia sent at 10/24/2024 10:35 AM CDT -----  Please let patient know iron level has returned extremely low along with an extremely high ferritin level based on these abnormalities along with his anemia I am referring him to hematology for their recommendations.  These abnormal levels can cause weight loss along with extreme fatigue and worsening depression symptoms.  I will want to have this follow up to see if this is the cause of any symptoms.  I have placed this referral

## 2024-10-24 NOTE — PATIENT INSTRUCTIONS
Do not stop or hold the aspirin or Plavix for a minimum of 6 months from last stenting    Stop the lisinopril and the propranolol  If BP starts to elevated >130s  then will add low dose toprol at  night       Maintain good blood pressure control-goal<130/80 at rest  Maintain good cholesterol control LDL goal<70 with arterial disease  If you are diabetic work to keep/obtain hemoglobin A1c< 7    Follow up in 1 mos    Continue cardiac rehab  Call with any questions or concerns  Follow up with Misty Blas APRN for non cardiac problems  Report any new problems  Cardiovascular Fitness-Exercise as tolerated.  Strive for 30 minutes of exercise most days of the week.    Cardiac / Healthy Diet- Avoid processed high fat foods, maintain low sodium/salt   Continue current medications as directed  Continue plan of treatment  It is always recommended that you bring your medications bottles with you to each visit - this is for your safety!

## 2024-10-30 ENCOUNTER — TELEPHONE (OUTPATIENT)
Dept: HEMATOLOGY | Age: 79
End: 2024-10-30

## 2024-10-30 ENCOUNTER — TELEPHONE (OUTPATIENT)
Dept: FAMILY MEDICINE CLINIC | Age: 79
End: 2024-10-30

## 2024-10-30 NOTE — TELEPHONE ENCOUNTER
Pts daughter called stating that pt doesn't have apt until Nov 22 and she said pt is so weak he can barely stand to shave. She is wanting to know if we can get him in sooner or can he be sent elsewhere to be seen sooner or should she just take him to the ER.     Will send to provider for recommendations.

## 2024-10-30 NOTE — TELEPHONE ENCOUNTER
CALLED REP. FOR [facility] -[phone] AND LET THEM KNOW THAT HE DOES HAVE NEW PT APPT, LEFT MESSAGE FOR THEM ABOUT DATE AND TIME, TOLD THEM TO CALL BACK WITH ANY QUESTIONS.

## 2024-10-30 NOTE — TELEPHONE ENCOUNTER
If he is that weak he needs to go to ER for evaluation.  Need to make sure his blood levels are ok

## 2024-10-30 NOTE — TELEPHONE ENCOUNTER
Called daughter with Misty Blas's rec and she said that hematology called and made an appt for him on Friday, 11/1. They will go to the ER if he is worse.

## 2024-10-30 NOTE — TELEPHONE ENCOUNTER
Called patient and informed that we have had a cancellation this Friday (11/01) at 0845. Patient states that he can come at that time. Appointment changed in chart.  Called and left a message on daughters voicemail.  Informed patient where our office is located.  Patient shreyas.

## 2024-10-31 NOTE — PROGRESS NOTES
medial to the cecum. The largest measuring 1.0 x 1.5 cm in diameter. There was acentric lobular densities seen involving the sigmoid colon. The prostate appeared mildly enlarged. No other abnormalities identified.    12/06/2020 Colectomy- a large palpable mass was encountered in the cecum. Palpation of the liver did not demonstrate any obvious abnormalities. There was no study of the abdomen. The omentum was wrapped around the cecal mass    01/28/2011 CT chest, abdomen, pelvis ([place])- negative chest CT. Lungs are clear. Negative upper abdominal CT. The liver is clear. Bowel pattern is normal. Benign 2.3 cm cyst upper pole left kidney. Negative pelvic CT. Previous rectal surgery. No adenopathy or free fluid noted.    01/28/2011 Bone scan ([place])- projected over the left ankle, there is a single tiny focus of slightly increased radiopharmaceutical uptake consistent with degenerative change. The distribution of radiopharmaceutical is otherwise within normal limits, specifically no suggestion of osseous metastatic disease.    02/14/2024 Colonoscopy ([place])- rectum grossly normal. Sigmoid colon contained a few diverticula. Advanced 5 cm from the anal verge, patient's colonic enteric anastomosis was reached. No other gross abnormalities were seen.    7/24/2024-CT scan of the abdomen and pelvis ([place])  Significant inflammatory stranding in the peripancreatic region and central abdominal extending to the lower abdominal which may reflect acute pancreatitis  Small amount of free fluid in the  Small hiatal hernia  Postoperative changes involving the distal  Severe prostatic and large          Past Medical History:    Past Medical History:   Diagnosis Date    Cancer (HCC) 01/01/2011    colon    GERD (gastroesophageal reflux disease)     Hyperlipidemia     Hypertension     Iron deficiency anemia secondary to inadequate dietary iron intake 11/01/2024    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

## 2024-11-01 ENCOUNTER — OFFICE VISIT (OUTPATIENT)
Dept: HEMATOLOGY | Age: 79
End: 2024-11-01
Payer: MEDICARE

## 2024-11-01 ENCOUNTER — HOSPITAL ENCOUNTER (OUTPATIENT)
Dept: INFUSION THERAPY | Age: 79
Discharge: HOME | End: 2024-11-01
Payer: MEDICARE

## 2024-11-01 ENCOUNTER — TELEPHONE (OUTPATIENT)
Dept: FAMILY MEDICINE CLINIC | Age: 79
End: 2024-11-01

## 2024-11-01 VITALS
DIASTOLIC BLOOD PRESSURE: 62 MMHG | HEIGHT: 68 IN | TEMPERATURE: 97.8 F | BODY MASS INDEX: 22.88 KG/M2 | WEIGHT: 151 LBS | OXYGEN SATURATION: 98 % | SYSTOLIC BLOOD PRESSURE: 124 MMHG | HEART RATE: 80 BPM

## 2024-11-01 DIAGNOSIS — R53.1 WEAKNESS: ICD-10-CM

## 2024-11-01 DIAGNOSIS — D50.8 IRON DEFICIENCY ANEMIA SECONDARY TO INADEQUATE DIETARY IRON INTAKE: Primary | ICD-10-CM

## 2024-11-01 DIAGNOSIS — Z12.5 SCREENING FOR MALIGNANT NEOPLASM OF PROSTATE: ICD-10-CM

## 2024-11-01 DIAGNOSIS — E53.8 B12 DEFICIENCY: ICD-10-CM

## 2024-11-01 DIAGNOSIS — R71.0 DROP IN HEMOGLOBIN: ICD-10-CM

## 2024-11-01 DIAGNOSIS — Z85.038 HISTORY OF COLON CANCER: ICD-10-CM

## 2024-11-01 DIAGNOSIS — N40.0 ENLARGED PROSTATE: ICD-10-CM

## 2024-11-01 DIAGNOSIS — E44.0 MODERATE PROTEIN-CALORIE MALNUTRITION (HCC): ICD-10-CM

## 2024-11-01 DIAGNOSIS — K90.9 IRON MALABSORPTION: ICD-10-CM

## 2024-11-01 DIAGNOSIS — D50.8 IRON DEFICIENCY ANEMIA SECONDARY TO INADEQUATE DIETARY IRON INTAKE: ICD-10-CM

## 2024-11-01 DIAGNOSIS — C18.9 ADENOCARCINOMA OF COLON (HCC): ICD-10-CM

## 2024-11-01 LAB
ALBUMIN SERPL-MCNC: 3.2 G/DL (ref 3.5–5.2)
ALP SERPL-CCNC: 84 U/L (ref 40–129)
ALT SERPL-CCNC: 10 U/L (ref 5–41)
ANION GAP SERPL CALCULATED.3IONS-SCNC: 15 MMOL/L (ref 7–19)
AST SERPL-CCNC: 17 U/L (ref 5–40)
BASOPHILS # BLD: 0.02 K/UL (ref 0.01–0.08)
BASOPHILS NFR BLD: 0.3 % (ref 0.1–1.2)
BILIRUB SERPL-MCNC: 0.6 MG/DL (ref 0–1.2)
BUN SERPL-MCNC: 20 MG/DL (ref 8–23)
CALCIUM SERPL-MCNC: 9.4 MG/DL (ref 8.8–10.2)
CEA SERPL-MCNC: 1.6 NG/ML (ref 0–4.7)
CHLORIDE SERPL-SCNC: 100 MMOL/L (ref 98–107)
CO2 SERPL-SCNC: 22 MMOL/L (ref 22–29)
CREAT SERPL-MCNC: 0.7 MG/DL (ref 0.7–1.2)
CRP SERPL HS-MCNC: 5.83 MG/DL (ref 0–0.5)
EOSINOPHIL # BLD: 0.05 K/UL (ref 0.04–0.54)
EOSINOPHIL NFR BLD: 0.7 % (ref 0.7–7)
ERYTHROCYTE [DISTWIDTH] IN BLOOD BY AUTOMATED COUNT: 14.9 % (ref 11.6–14.4)
FOLATE SERPL-MCNC: >20 NG/ML (ref 4.5–32.2)
GLUCOSE SERPL-MCNC: 123 MG/DL (ref 70–99)
HCT VFR BLD AUTO: 27.9 % (ref 40.1–51)
HCT VFR BLD AUTO: 29.7 % (ref 40.1–51)
HGB BLD-MCNC: 9.4 G/DL (ref 13.7–17.5)
LDH SERPL-CCNC: 101 U/L (ref 135–225)
LYMPHOCYTES # BLD: 0.92 K/UL (ref 1.18–3.74)
LYMPHOCYTES NFR BLD: 12.2 % (ref 19.3–53.1)
MCH RBC QN AUTO: 29.4 PG (ref 25.7–32.2)
MCHC RBC AUTO-ENTMCNC: 33.7 G/DL (ref 32.3–36.5)
MCV RBC AUTO: 87.2 FL (ref 79–92.2)
MONOCYTES # BLD: 0.45 K/UL (ref 0.24–0.82)
MONOCYTES NFR BLD: 5.9 % (ref 4.7–12.5)
NEUTROPHILS # BLD: 6.11 K/UL (ref 1.56–6.13)
NEUTS SEG NFR BLD: 80.6 % (ref 34–71.1)
PLATELET # BLD AUTO: 360 K/UL (ref 163–337)
PMV BLD AUTO: 8.8 FL (ref 7.4–10.4)
POTASSIUM SERPL-SCNC: 4 MMOL/L (ref 3.5–5.1)
PROT SERPL-MCNC: 7.3 G/DL (ref 6.4–8.3)
RBC # BLD AUTO: 3.2 M/UL (ref 4.63–6.08)
SODIUM SERPL-SCNC: 137 MMOL/L (ref 136–145)
VIT B12 SERPL-MCNC: 866 PG/ML (ref 232–1245)
WBC # BLD AUTO: 7.57 K/UL (ref 4.23–9.07)

## 2024-11-01 PROCEDURE — 99213 OFFICE O/P EST LOW 20 MIN: CPT

## 2024-11-01 PROCEDURE — 83615 LACTATE (LD) (LDH) ENZYME: CPT

## 2024-11-01 PROCEDURE — 85025 COMPLETE CBC W/AUTO DIFF WBC: CPT

## 2024-11-01 PROCEDURE — 80053 COMPREHEN METABOLIC PANEL: CPT

## 2024-11-01 PROCEDURE — 36415 COLL VENOUS BLD VENIPUNCTURE: CPT

## 2024-11-01 RX ORDER — SODIUM CHLORIDE 0.9 % (FLUSH) 0.9 %
5-40 SYRINGE (ML) INJECTION PRN
OUTPATIENT
Start: 2024-11-04

## 2024-11-01 RX ORDER — ACETAMINOPHEN 500 MG
1 TABLET ORAL DAILY
Qty: 30 TABLET | Refills: 5 | Status: SHIPPED | OUTPATIENT
Start: 2024-11-01

## 2024-11-01 RX ORDER — SODIUM CHLORIDE 9 MG/ML
5-250 INJECTION, SOLUTION INTRAVENOUS PRN
OUTPATIENT
Start: 2024-11-04

## 2024-11-01 RX ORDER — DIPHENHYDRAMINE HYDROCHLORIDE 50 MG/ML
50 INJECTION INTRAMUSCULAR; INTRAVENOUS
OUTPATIENT
Start: 2024-11-04

## 2024-11-01 RX ORDER — HEPARIN SODIUM (PORCINE) LOCK FLUSH IV SOLN 100 UNIT/ML 100 UNIT/ML
500 SOLUTION INTRAVENOUS PRN
OUTPATIENT
Start: 2024-11-04

## 2024-11-01 RX ORDER — EPINEPHRINE 1 MG/ML
0.3 INJECTION, SOLUTION, CONCENTRATE INTRAVENOUS PRN
OUTPATIENT
Start: 2024-11-04

## 2024-11-01 RX ORDER — ONDANSETRON 2 MG/ML
8 INJECTION INTRAMUSCULAR; INTRAVENOUS
OUTPATIENT
Start: 2024-11-04

## 2024-11-01 RX ORDER — SODIUM CHLORIDE 9 MG/ML
INJECTION, SOLUTION INTRAVENOUS CONTINUOUS
OUTPATIENT
Start: 2024-11-04

## 2024-11-01 RX ORDER — ACETAMINOPHEN 325 MG/1
650 TABLET ORAL
OUTPATIENT
Start: 2024-11-04

## 2024-11-01 RX ORDER — FAMOTIDINE 10 MG/ML
20 INJECTION, SOLUTION INTRAVENOUS
OUTPATIENT
Start: 2024-11-04

## 2024-11-01 RX ORDER — ALBUTEROL SULFATE 90 UG/1
4 INHALANT RESPIRATORY (INHALATION) PRN
OUTPATIENT
Start: 2024-11-04

## 2024-11-01 ASSESSMENT — ENCOUNTER SYMPTOMS
CONSTIPATION: 0
SHORTNESS OF BREATH: 1
GASTROINTESTINAL NEGATIVE: 1
EYE DISCHARGE: 0
BACK PAIN: 0
NAUSEA: 0
WHEEZING: 0
COUGH: 0
EYE PAIN: 0
BLOOD IN STOOL: 0
EYE REDNESS: 0
EYES NEGATIVE: 1
VOMITING: 0
ABDOMINAL PAIN: 0
SORE THROAT: 0
DIARRHEA: 0

## 2024-11-01 NOTE — TELEPHONE ENCOUNTER
Pt daughter called in. They went to the infusion center today and she stated they just chidi labs and then sent the patient home. They thought that he was going to get a iron infusion today. I advised her to reach out to them and see what their plan for the patient was. She said it was her understanding from them he wouldn't get an infusion for another 8 weeks. She is wanting to know if there is anywhere else that we could refer to that would get him an infusion sooner?

## 2024-11-01 NOTE — TELEPHONE ENCOUNTER
Called and spoke to patients daughter Verena with advice.    She stated that since she talked to us hematology nurse called them and explained everything. He has an appt next week and they should have the infusion approved.

## 2024-11-01 NOTE — TELEPHONE ENCOUNTER
Iron infusion would be determined by hematology now that he established with them.  I would wait to see what his labs results are today and then determine with hematology if he can have another sooner than that jaky

## 2024-11-04 LAB
COPPER SERPL-MCNC: 135.8 UG/DL (ref 70–140)
ZINC SERPL-MCNC: 62.1 UG/DL (ref 60–120)

## 2024-11-06 ENCOUNTER — OFFICE VISIT (OUTPATIENT)
Dept: FAMILY MEDICINE CLINIC | Age: 79
End: 2024-11-06

## 2024-11-06 ENCOUNTER — TELEPHONE (OUTPATIENT)
Dept: FAMILY MEDICINE CLINIC | Age: 79
End: 2024-11-06

## 2024-11-06 VITALS
SYSTOLIC BLOOD PRESSURE: 104 MMHG | OXYGEN SATURATION: 97 % | HEART RATE: 62 BPM | BODY MASS INDEX: 22.58 KG/M2 | DIASTOLIC BLOOD PRESSURE: 60 MMHG | WEIGHT: 149 LBS | HEIGHT: 68 IN | TEMPERATURE: 97.8 F

## 2024-11-06 DIAGNOSIS — R68.2 DRY MOUTH: ICD-10-CM

## 2024-11-06 DIAGNOSIS — R63.4 WEIGHT LOSS: ICD-10-CM

## 2024-11-06 DIAGNOSIS — F32.0 CURRENT MILD EPISODE OF MAJOR DEPRESSIVE DISORDER WITHOUT PRIOR EPISODE (HCC): Primary | ICD-10-CM

## 2024-11-06 DIAGNOSIS — E78.5 HYPERLIPIDEMIA, UNSPECIFIED HYPERLIPIDEMIA TYPE: ICD-10-CM

## 2024-11-06 DIAGNOSIS — D50.9 IRON DEFICIENCY ANEMIA, UNSPECIFIED IRON DEFICIENCY ANEMIA TYPE: ICD-10-CM

## 2024-11-06 PROBLEM — C18.9 ADENOCARCINOMA OF COLON (HCC): Status: RESOLVED | Noted: 2020-02-23 | Resolved: 2024-11-06

## 2024-11-06 RX ORDER — PAROXETINE 20 MG/1
20 TABLET, FILM COATED ORAL DAILY
Qty: 30 TABLET | Refills: 3 | Status: SHIPPED | OUTPATIENT
Start: 2024-11-06

## 2024-11-06 ASSESSMENT — ENCOUNTER SYMPTOMS
GASTROINTESTINAL NEGATIVE: 1
EYES NEGATIVE: 1
RESPIRATORY NEGATIVE: 1

## 2024-11-06 NOTE — TELEPHONE ENCOUNTER
Patient was questioning an iron infusion.  I did not see a note about you wanting him to have one, but I told him I would reach out to you to see if we can get this set up if you felt like it was needed.

## 2024-11-06 NOTE — PROGRESS NOTES
[facility]  [address] [place] [address]  Dept: [phone]  Dept Fax: [phone]  Loc: [phone]    Fernando Chu is a 79 y.o. male who presents today for his medical conditions/complaints as noted below.  Fernando Chu is c/o of Follow-up (Medication)      Chief Complaint   Patient presents with    Follow-up     Medication       HPI:     HPI  Patient presents today for medication follow up.  He was to continue paxil.  He states that he does feel a little better today.  He was able to eat 1/2 can of chicken noodle last night.  He states that biotene is helping some with his try mouth.  He states that his BP has been doing better at home.  He has remained off of lisinopril and propranolol.  He continues to complete cardiac rehab.    Patient has establish care with hematology and had the first appointment with her.  He has had additional labs completed.  He has known iron deficiency anemia and has started oral supplement for this.    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 has been removed        Past Surgical History:   Procedure Laterality Date    CARDIAC PROCEDURE N/A 9/19/2024    Left heart cath / coronary angiography performed by Ronan Carr MD at [facility] CARDIAC CATH LAB    CARDIAC PROCEDURE N/A 9/19/2024    Percutaneous coronary intervention performed by Ronan Carr MD at [facility] CARDIAC CATH LAB    CARDIAC PROCEDURE N/A 10/10/2024    Left heart cath / coronary angiography performed by Ronan Carr MD at [facility] CARDIAC CATH LAB    CARDIAC PROCEDURE N/A 10/10/2024    Percutaneous coronary intervention performed by Ronan Carr MD at [facility] CARDIAC CATH LAB    COLON SURGERY  12/06/2010

## 2024-11-12 ENCOUNTER — HOSPITAL ENCOUNTER (OUTPATIENT)
Dept: INFUSION THERAPY | Age: 79
Setting detail: INFUSION SERIES
Discharge: HOME | End: 2024-11-12
Payer: MEDICARE

## 2024-11-12 VITALS
OXYGEN SATURATION: 98 % | TEMPERATURE: 96.8 F | SYSTOLIC BLOOD PRESSURE: 100 MMHG | HEART RATE: 76 BPM | RESPIRATION RATE: 17 BRPM | DIASTOLIC BLOOD PRESSURE: 68 MMHG

## 2024-11-12 DIAGNOSIS — D50.8 IRON DEFICIENCY ANEMIA SECONDARY TO INADEQUATE DIETARY IRON INTAKE: ICD-10-CM

## 2024-11-12 DIAGNOSIS — K90.9 IRON MALABSORPTION: Primary | ICD-10-CM

## 2024-11-12 PROCEDURE — 96365 THER/PROPH/DIAG IV INF INIT: CPT

## 2024-11-12 PROCEDURE — 6360000002 HC RX W HCPCS: Performed by: NURSE PRACTITIONER

## 2024-11-12 PROCEDURE — 2580000003 HC RX 258: Performed by: NURSE PRACTITIONER

## 2024-11-12 RX ORDER — ACETAMINOPHEN 325 MG/1
650 TABLET ORAL
OUTPATIENT
Start: 2024-11-19

## 2024-11-12 RX ORDER — SODIUM CHLORIDE 0.9 % (FLUSH) 0.9 %
5-40 SYRINGE (ML) INJECTION PRN
Status: DISCONTINUED | OUTPATIENT
Start: 2024-11-12 | End: 2024-11-13 | Stop reason: HOSPADM

## 2024-11-12 RX ORDER — EPINEPHRINE 1 MG/ML
0.3 INJECTION, SOLUTION, CONCENTRATE INTRAVENOUS PRN
OUTPATIENT
Start: 2024-11-19

## 2024-11-12 RX ORDER — SODIUM CHLORIDE 0.9 % (FLUSH) 0.9 %
5-40 SYRINGE (ML) INJECTION PRN
Status: CANCELLED | OUTPATIENT
Start: 2024-11-19

## 2024-11-12 RX ORDER — SODIUM CHLORIDE 9 MG/ML
INJECTION, SOLUTION INTRAVENOUS CONTINUOUS
OUTPATIENT
Start: 2024-11-19

## 2024-11-12 RX ORDER — SODIUM CHLORIDE 9 MG/ML
5-250 INJECTION, SOLUTION INTRAVENOUS PRN
Status: CANCELLED | OUTPATIENT
Start: 2024-11-19

## 2024-11-12 RX ORDER — HEPARIN 100 UNIT/ML
500 SYRINGE INTRAVENOUS PRN
OUTPATIENT
Start: 2024-11-19

## 2024-11-12 RX ORDER — SODIUM CHLORIDE 9 MG/ML
5-250 INJECTION, SOLUTION INTRAVENOUS PRN
Status: DISCONTINUED | OUTPATIENT
Start: 2024-11-12 | End: 2024-11-13 | Stop reason: HOSPADM

## 2024-11-12 RX ORDER — ONDANSETRON 2 MG/ML
8 INJECTION INTRAMUSCULAR; INTRAVENOUS
OUTPATIENT
Start: 2024-11-19

## 2024-11-12 RX ORDER — DIPHENHYDRAMINE HYDROCHLORIDE 50 MG/ML
50 INJECTION INTRAMUSCULAR; INTRAVENOUS
OUTPATIENT
Start: 2024-11-19

## 2024-11-12 RX ORDER — SODIUM CHLORIDE 9 MG/ML
5-250 INJECTION, SOLUTION INTRAVENOUS PRN
OUTPATIENT
Start: 2024-11-19

## 2024-11-12 RX ORDER — HYDROCORTISONE SODIUM SUCCINATE 100 MG/2ML
100 INJECTION INTRAMUSCULAR; INTRAVENOUS
OUTPATIENT
Start: 2024-11-19

## 2024-11-12 RX ORDER — ALBUTEROL SULFATE 90 UG/1
4 INHALANT RESPIRATORY (INHALATION) PRN
OUTPATIENT
Start: 2024-11-19

## 2024-11-12 RX ADMIN — SODIUM CHLORIDE, PRESERVATIVE FREE 10 ML: 5 INJECTION INTRAVENOUS at 08:35

## 2024-11-12 RX ADMIN — SODIUM CHLORIDE 20 ML/HR: 9 INJECTION, SOLUTION INTRAVENOUS at 07:32

## 2024-11-12 RX ADMIN — SODIUM CHLORIDE, PRESERVATIVE FREE 10 ML: 5 INJECTION INTRAVENOUS at 07:25

## 2024-11-12 RX ADMIN — FERUMOXYTOL 510 MG: 510 INJECTION INTRAVENOUS at 07:33

## 2024-11-12 NOTE — DISCHARGE INSTRUCTIONS
ferumoxytol  Pronunciation:  GLENN ue MOX i stef  Brand:  Feraheme  What is the most important information I should know about ferumoxytol?  Ferumoxytol can cause severe or fatal allergic reactions, even if you have used this medicine before without any reaction. Get emergency medical help if you have hives, itching, wheezing, trouble breathing, swelling in your face or throat, or feeling like you might pass out. Watch for signs of allergic reaction for at least 30 minutes after your injection.  What is ferumoxytol?  Ferumoxytol is an iron replacement product that is used in adults used to treat iron deficiency anemia (TAMIKO), which is low red blood cells caused by a lack of iron in the body.  Ferumoxytol is given to adults with TAMIKO and chronic kidney disease, or to adults with TAMIKO when iron taken by mouth is not effective.  Ferumoxytol may also be used for purposes not listed in this medication guide.  What should I discuss with my healthcare provider before receiving ferumoxytol?  You should not use ferumoxytol if you are allergic to it, or if:  you've had an allergic reaction to iron injected into a vein.  Tell your doctor if you have ever had:  iron overload syndrome;  any drug allergies; or  low blood pressure.  Tell your doctor if you are pregnant or plan to become pregnant. It is not known if ferumoxytol will harm an unborn baby, but this medicine may cause severe reactions in the mother that could affect the baby's heartbeat.  Having iron deficiency anemia during pregnancy may increase the risk of premature birth or low birth weight. The benefit of treating this condition with ferumoxytol may outweigh any risks to the baby.  Ask a doctor if it is safe to breastfeed while using this medicine.  How is ferumoxytol given?  Ferumoxytol is injected into a vein by a healthcare provider.  This medicine must be given slowly over 15 minutes.  You will be watched for at least 30 minutes to make sure you do not have an

## 2024-11-12 NOTE — FLOWSHEET NOTE
11/12/24 0913   AVS Reviewed   AVS & discharge instructions reviewed with patient and/or representative? Yes   Reviewed instructions with Patient   Level of Understanding Questions answered;Verbalized understanding;Other (comment)  (discharge paperwork given to pt.)     Feraheme 510mg iv infusion complete. Vss. Pt stayed 30 minutes post infusion for observation. No adverse reaction noted. Discharge instructions and paperwork given to pt. Pt verbalized understanding.

## 2024-11-15 ENCOUNTER — TELEPHONE (OUTPATIENT)
Dept: FAMILY MEDICINE CLINIC | Age: 79
End: 2024-11-15

## 2024-11-15 DIAGNOSIS — R63.4 WEIGHT LOSS: Primary | ICD-10-CM

## 2024-11-15 NOTE — TELEPHONE ENCOUNTER
Spoke to daughter with recommendation. She said to go ahead and send referral and when they call he can make one or refuse it if he doesn't want to see anyone.

## 2024-11-15 NOTE — TELEPHONE ENCOUNTER
I think we need to follow up with GI in regards to the weight loss and see if they have any recommendations.  Please find out if they are ok with this referral

## 2024-11-15 NOTE — TELEPHONE ENCOUNTER
Patient daughter called with some questions.    BP meds that he was on was helping with his tremor and his low BP. His overall health is better, the Paxil and iron infusions are helping him a lot.     Her concern is his weight loss. He just has no appetite and everything tastes bad.     She wanted to know if he needs to be prescribed something for his low BP and his tremor? He has a followup sched for 12/4. Does he need to come in sooner?    She also wanted to thank you for everything you've done for him.

## 2024-11-19 ENCOUNTER — HOSPITAL ENCOUNTER (OUTPATIENT)
Dept: INFUSION THERAPY | Age: 79
Setting detail: INFUSION SERIES
Discharge: HOME | End: 2024-11-19
Payer: MEDICARE

## 2024-11-19 VITALS
OXYGEN SATURATION: 98 % | DIASTOLIC BLOOD PRESSURE: 65 MMHG | TEMPERATURE: 97.2 F | SYSTOLIC BLOOD PRESSURE: 93 MMHG | HEART RATE: 75 BPM | RESPIRATION RATE: 18 BRPM

## 2024-11-19 DIAGNOSIS — D50.8 IRON DEFICIENCY ANEMIA SECONDARY TO INADEQUATE DIETARY IRON INTAKE: ICD-10-CM

## 2024-11-19 DIAGNOSIS — K90.9 IRON MALABSORPTION: Primary | ICD-10-CM

## 2024-11-19 PROCEDURE — 6360000002 HC RX W HCPCS: Performed by: NURSE PRACTITIONER

## 2024-11-19 PROCEDURE — 96365 THER/PROPH/DIAG IV INF INIT: CPT

## 2024-11-19 PROCEDURE — 2580000003 HC RX 258: Performed by: NURSE PRACTITIONER

## 2024-11-19 RX ORDER — ACETAMINOPHEN 325 MG/1
650 TABLET ORAL
OUTPATIENT
Start: 2024-11-19

## 2024-11-19 RX ORDER — SODIUM CHLORIDE 9 MG/ML
5-250 INJECTION, SOLUTION INTRAVENOUS PRN
OUTPATIENT
Start: 2024-11-19

## 2024-11-19 RX ORDER — SODIUM CHLORIDE 9 MG/ML
INJECTION, SOLUTION INTRAVENOUS CONTINUOUS
OUTPATIENT
Start: 2024-11-19

## 2024-11-19 RX ORDER — SODIUM CHLORIDE 9 MG/ML
5-250 INJECTION, SOLUTION INTRAVENOUS PRN
Status: CANCELLED | OUTPATIENT
Start: 2024-11-19

## 2024-11-19 RX ORDER — ALBUTEROL SULFATE 90 UG/1
4 INHALANT RESPIRATORY (INHALATION) PRN
OUTPATIENT
Start: 2024-11-19

## 2024-11-19 RX ORDER — ONDANSETRON 2 MG/ML
8 INJECTION INTRAMUSCULAR; INTRAVENOUS
OUTPATIENT
Start: 2024-11-19

## 2024-11-19 RX ORDER — DIPHENHYDRAMINE HYDROCHLORIDE 50 MG/ML
50 INJECTION INTRAMUSCULAR; INTRAVENOUS
OUTPATIENT
Start: 2024-11-19

## 2024-11-19 RX ORDER — SODIUM CHLORIDE 0.9 % (FLUSH) 0.9 %
5-40 SYRINGE (ML) INJECTION PRN
OUTPATIENT
Start: 2024-11-19

## 2024-11-19 RX ORDER — EPINEPHRINE 1 MG/ML
0.3 INJECTION, SOLUTION, CONCENTRATE INTRAVENOUS PRN
OUTPATIENT
Start: 2024-11-19

## 2024-11-19 RX ORDER — SODIUM CHLORIDE 0.9 % (FLUSH) 0.9 %
5-40 SYRINGE (ML) INJECTION PRN
Status: DISCONTINUED | OUTPATIENT
Start: 2024-11-19 | End: 2024-11-20 | Stop reason: HOSPADM

## 2024-11-19 RX ORDER — HYDROCORTISONE SODIUM SUCCINATE 100 MG/2ML
100 INJECTION INTRAMUSCULAR; INTRAVENOUS
OUTPATIENT
Start: 2024-11-19

## 2024-11-19 RX ORDER — HEPARIN 100 UNIT/ML
500 SYRINGE INTRAVENOUS PRN
OUTPATIENT
Start: 2024-11-19

## 2024-11-19 RX ORDER — SODIUM CHLORIDE 9 MG/ML
5-250 INJECTION, SOLUTION INTRAVENOUS PRN
Status: DISCONTINUED | OUTPATIENT
Start: 2024-11-19 | End: 2024-11-20 | Stop reason: HOSPADM

## 2024-11-19 RX ADMIN — FERUMOXYTOL 510 MG: 510 INJECTION INTRAVENOUS at 13:51

## 2024-11-19 RX ADMIN — Medication 10 ML: at 13:50

## 2024-11-19 NOTE — PROGRESS NOTES
Pt arrived to [facility]. PIV inserted and brisk blood return noted. Pt received 510mg Feraheme. Tolerated well. Pt monitored for 30 min post infusion. No s/s adverse reaction.    Electronically signed by Ninoska Gomez RN on 11/19/2024 at 3:22 PM

## 2024-11-27 DIAGNOSIS — F41.9 ANXIETY: ICD-10-CM

## 2024-11-27 RX ORDER — HYDROXYZINE HYDROCHLORIDE 25 MG/1
25 TABLET, FILM COATED ORAL 3 TIMES DAILY
Qty: 90 TABLET | Refills: 0 | Status: SHIPPED | OUTPATIENT
Start: 2024-11-27

## 2024-11-27 NOTE — TELEPHONE ENCOUNTER
Received fax from pharmacy requesting refill on pts medication(s). Pt was last seen in office on 11/6/2024  and has a follow up scheduled for 12/4/2024. Will send request to  Misty Blas  for authorization.     Requested Prescriptions     Pending Prescriptions Disp Refills    hydrOXYzine HCl (ATARAX) 25 MG tablet [Pharmacy Med Name: hydroxyzine HCl 25 mg tablet] 90 tablet 0     Sig: TAKE ONE TABLET BY MOUTH THREE TIMES DAILY

## 2024-12-02 NOTE — PROGRESS NOTES
Subjective    Mr. Rodriguez is 79 y.o. male    Chief Complaint: Hydrocele    History of Present Illness    79-year-old male established patient with known history of enlarged prostate and chronic right-sided hydrocele. He is following up today because he recently had an episode of gross hematuria an increased swelling of his testicles after cardiac rehab. He states that he had one episode of gross hematuria and a slight increase in the hydrocele the morning after cardiac rehab that has now resolved. He states he may have had more trouble urinating, needing to sit instead of stand but that has since resolved. He made the appointment today because he was afraid that Dr. Hudson might need to remove the hydrocele.  He denies current gross hematuria, urinary retention or dysuria.  LUTS are well controlled on finasteride and tamsulosin.  He is no longer using the tadalafil.  He had scrotal ultrasound done at [facility] on 7/30/2024 demonstrating known right-sided simple hydrocele, smaller left-sided hydrocele, normal-appearing testicles without mass. UA with moderate blood and wbc.       The following portions of the patient's history were reviewed and updated as appropriate: allergies, current medications, past family history, past medical history, past social history, past surgical history and problem list.    Review of Systems  Constitutional:No apparent distress; vitals reviewed as above  Psychiatric: Appropriate affect; alert and oriented  Musculoskeletal: Normal gait and station  Respiratory: No distress; unlabored movement; no accessory musculature needed with symmetric movements  Skin: No pallor or diaphoresis      Current Outpatient Medications:     atorvastatin (LIPITOR) 20 MG tablet, Take 1 tablet by mouth Every Night., Disp: , Rfl:     B Complex-C (SUPER B COMPLEX PO), Take  by mouth., Disp: , Rfl:     cloNIDine (CATAPRES) 0.1 MG tablet, TAKE ONE TABLET BY MOUTH EVERY TWELVE HOURS AS NEEDED, Disp: , Rfl:      clopidogrel (PLAVIX) 75 MG tablet, Take 1 tablet by mouth Daily., Disp: , Rfl:     Coenzyme Q10 200 MG capsule, Take 200 mg by mouth Daily., Disp: , Rfl:     donepezil (ARICEPT) 10 MG tablet, Take 1 tablet by mouth Every Night., Disp: , Rfl:     ferrous sulfate dried ER (Slow Release Iron) 45 MG tablet controlled-release tablet, Take 140 mg by mouth Daily., Disp: , Rfl:     finasteride (PROSCAR) 5 MG tablet, Take 1 tablet by mouth Daily., Disp: 90 tablet, Rfl: 4    hydrOXYzine (ATARAX) 25 MG tablet, Take 1 tablet by mouth 3 (Three) Times a Day., Disp: , Rfl:     levocetirizine (XYZAL) 5 MG tablet, Take 1 tablet by mouth every night at bedtime., Disp: , Rfl:     Mouthwashes (Biotene dry mouth) liquid liquid, 15 mL., Disp: , Rfl:     multivitamin (THERAGRAN) tablet tablet, Take  by mouth Daily., Disp: , Rfl:     Omega-3 Fatty Acids (FISH OIL) 1000 MG capsule capsule, Take  by mouth Daily With Breakfast., Disp: , Rfl:     omeprazole (priLOSEC) 20 MG capsule, Take 1 capsule by mouth., Disp: , Rfl:     PARoxetine (PAXIL) 20 MG tablet, Take 1 tablet by mouth Daily., Disp: , Rfl:     tamsulosin (FLOMAX) 0.4 MG capsule 24 hr capsule, Take 1 capsule by mouth Every Night. nightly, Disp: 90 capsule, Rfl: 4    vitamin D (ERGOCALCIFEROL) 1.25 MG (93707 UT) capsule capsule, Take 1 capsule by mouth 1 (One) Time Per Week., Disp: , Rfl:     lisinopril (PRINIVIL,ZESTRIL) 10 MG tablet, Take 1 tablet by mouth Daily. (Patient not taking: Reported on 12/3/2024), Disp: , Rfl:     propranolol (INDERAL) 40 MG tablet, Take 1 tablet by mouth 2 (Two) Times a Day. (Patient not taking: Reported on 12/3/2024), Disp: , Rfl:     Past Medical History:   Diagnosis Date    BPH with urinary obstruction     Colon cancer     Elevated PSA     Frequency of urination     Hydrocele     Hyperlipemia     Peritonitis     Testalgia        Past Surgical History:   Procedure Laterality Date    APPENDECTOMY      COLONOSCOPY      FOOT SURGERY      HERNIA REPAIR       "PROSTATE BIOPSY         Social History     Socioeconomic History    Marital status:    Tobacco Use    Smoking status: Never     Passive exposure: Never    Smokeless tobacco: Never   Vaping Use    Vaping status: Never Used   Substance and Sexual Activity    Alcohol use: Yes    Drug use: Never    Sexual activity: Defer       Family History   Problem Relation Age of Onset    No Known Problems Father     No Known Problems Mother        Objective    Ht 177.8 cm (70\")   Wt 65.8 kg (145 lb)   BMI 20.81 kg/m²     Physical Exam    Constitutional:No apparent distress; vitals reviewed as above  Psychiatric: Appropriate affect; alert and oriented  Musculoskeletal: Normal gait and station  Respiratory: No distress; unlabored movement; no accessory musculature needed with symmetric movements  Skin: No pallor or diaphoresis;    Results for orders placed or performed in visit on 12/03/24   POC Urinalysis Dipstick, Multipro    Collection Time: 12/03/24 10:18 AM    Specimen: Urine   Result Value Ref Range    Color Yellow Yellow, Straw, Dark Yellow, Cori    Clarity, UA Clear Clear    Glucose, UA Negative Negative mg/dL    Bilirubin Negative Negative    Ketones, UA Negative Negative    Specific Gravity  1.020 1.005 - 1.030    Blood, UA Moderate (A) Negative    pH, Urine 7.0 5.0 - 8.0    Protein, POC Negative Negative mg/dL    Urobilinogen, UA Normal Normal, 0.2 E.U./dL    Nitrite, UA Negative Negative    Leukocytes Trace (A) Negative     Assessment and Plan    Diagnoses and all orders for this visit:    1. Other hydrocele (Primary)  -     POC Urinalysis Dipstick, Multipro    2. BPH (benign prostatic hypertrophy) with urinary obstruction    3. Gross hematuria      Currently asymptomatic with swelling reduced and no gross hematuria, likely exercise induced hematuria from enlarged prostate. Advised to switching to more supportive underwear such as briefs when going to cardiac rehab to prevent future swelling. Patient will " continue Tamsulosin and Finasteride as controlling LUTs. Patient has follow up scheduled with Dr. Hudson next year, or can be sooner if needed.

## 2024-12-03 ENCOUNTER — OFFICE VISIT (OUTPATIENT)
Dept: UROLOGY | Facility: CLINIC | Age: 79
End: 2024-12-03
Payer: MEDICARE

## 2024-12-03 VITALS — HEIGHT: 70 IN | WEIGHT: 145 LBS | BODY MASS INDEX: 20.76 KG/M2

## 2024-12-03 DIAGNOSIS — R31.0 GROSS HEMATURIA: ICD-10-CM

## 2024-12-03 DIAGNOSIS — N40.1 BENIGN PROSTATIC HYPERPLASIA WITH URINARY OBSTRUCTION: ICD-10-CM

## 2024-12-03 DIAGNOSIS — N43.2 OTHER HYDROCELE: Primary | ICD-10-CM

## 2024-12-03 DIAGNOSIS — N13.8 BENIGN PROSTATIC HYPERPLASIA WITH URINARY OBSTRUCTION: ICD-10-CM

## 2024-12-03 LAB
BILIRUB BLD-MCNC: NEGATIVE MG/DL
CLARITY, POC: CLEAR
COLOR UR: YELLOW
GLUCOSE UR STRIP-MCNC: NEGATIVE MG/DL
KETONES UR QL: NEGATIVE
LEUKOCYTE EST, POC: ABNORMAL
NITRITE UR-MCNC: NEGATIVE MG/ML
PH UR: 7 [PH] (ref 5–8)
PROT UR STRIP-MCNC: NEGATIVE MG/DL
RBC # UR STRIP: ABNORMAL /UL
SP GR UR: 1.02 (ref 1–1.03)
UROBILINOGEN UR QL: NORMAL

## 2024-12-03 PROCEDURE — 81003 URINALYSIS AUTO W/O SCOPE: CPT

## 2024-12-03 PROCEDURE — 99204 OFFICE O/P NEW MOD 45 MIN: CPT

## 2024-12-03 RX ORDER — CLOPIDOGREL BISULFATE 75 MG/1
1 TABLET ORAL DAILY
COMMUNITY
Start: 2024-09-20

## 2024-12-03 RX ORDER — ATORVASTATIN CALCIUM 20 MG/1
1 TABLET, FILM COATED ORAL NIGHTLY
COMMUNITY
Start: 2024-09-19

## 2024-12-03 RX ORDER — HYDROXYZINE HYDROCHLORIDE 25 MG/1
1 TABLET, FILM COATED ORAL 3 TIMES DAILY
COMMUNITY
Start: 2024-11-27

## 2024-12-03 RX ORDER — LEVOCETIRIZINE DIHYDROCHLORIDE 5 MG/1
5 TABLET, FILM COATED ORAL
COMMUNITY
Start: 2024-11-06

## 2024-12-03 RX ORDER — PAROXETINE 20 MG/1
1 TABLET, FILM COATED ORAL DAILY
COMMUNITY
Start: 2024-11-06

## 2024-12-03 RX ORDER — FLUORIDE TOOTHPASTE
15 TOOTHPASTE DENTAL
COMMUNITY
Start: 2024-10-23

## 2024-12-03 RX ORDER — ACETAMINOPHEN 500 MG
1 TABLET ORAL DAILY
COMMUNITY
Start: 2024-11-01

## 2024-12-03 RX ORDER — DONEPEZIL HYDROCHLORIDE 10 MG/1
1 TABLET, FILM COATED ORAL NIGHTLY
COMMUNITY

## 2024-12-04 ENCOUNTER — OFFICE VISIT (OUTPATIENT)
Dept: FAMILY MEDICINE CLINIC | Age: 79
End: 2024-12-04
Payer: MEDICARE

## 2024-12-04 VITALS
BODY MASS INDEX: 21.37 KG/M2 | SYSTOLIC BLOOD PRESSURE: 108 MMHG | DIASTOLIC BLOOD PRESSURE: 60 MMHG | HEART RATE: 98 BPM | WEIGHT: 141 LBS | OXYGEN SATURATION: 96 % | TEMPERATURE: 97.3 F | HEIGHT: 68 IN

## 2024-12-04 DIAGNOSIS — R63.4 WEIGHT LOSS: ICD-10-CM

## 2024-12-04 DIAGNOSIS — D50.9 IRON DEFICIENCY ANEMIA, UNSPECIFIED IRON DEFICIENCY ANEMIA TYPE: ICD-10-CM

## 2024-12-04 DIAGNOSIS — F41.9 ANXIETY: Primary | ICD-10-CM

## 2024-12-04 DIAGNOSIS — F32.0 CURRENT MILD EPISODE OF MAJOR DEPRESSIVE DISORDER WITHOUT PRIOR EPISODE (HCC): ICD-10-CM

## 2024-12-04 PROCEDURE — 1160F RVW MEDS BY RX/DR IN RCRD: CPT | Performed by: NURSE PRACTITIONER

## 2024-12-04 PROCEDURE — 99214 OFFICE O/P EST MOD 30 MIN: CPT | Performed by: NURSE PRACTITIONER

## 2024-12-04 PROCEDURE — 1159F MED LIST DOCD IN RCRD: CPT | Performed by: NURSE PRACTITIONER

## 2024-12-04 PROCEDURE — 1123F ACP DISCUSS/DSCN MKR DOCD: CPT | Performed by: NURSE PRACTITIONER

## 2024-12-04 RX ORDER — MEGESTROL ACETATE 40 MG/1
40 TABLET ORAL DAILY
Qty: 30 TABLET | Refills: 3 | Status: CANCELLED | OUTPATIENT
Start: 2024-12-04

## 2024-12-04 ASSESSMENT — ENCOUNTER SYMPTOMS
RESPIRATORY NEGATIVE: 1
GASTROINTESTINAL NEGATIVE: 1
EYES NEGATIVE: 1

## 2024-12-04 NOTE — PROGRESS NOTES
starting Paxil.  Will continue current regimen of 20 mg daily.  Patient has lost additional weight since last visit, but reports actually gaining weight over the last week or so as his appetite has started to improve.  He is beginning to taste food again as well.  Overall patient feels like things are improving since last visit.  Today in office I also noticed improvement in overall status and mood.    Will recheck again in 6 weeks.  I am hopeful that he will have weight gain by that point.  Okay to hold off on gastro as patient wishes to do this at this time.  If no improvement on weight and overall symptoms by next visit must have follow-up with them as discussed.    Patient is to keep follow-up with hematology in January as well.    Return in about 6 weeks (around 1/15/2025), or if symptoms worsen or fail to improve, for Follow up chronic conditions.    No orders of the defined types were placed in this encounter.      No orders of the defined types were placed in this encounter.           Patient offered educational handouts and has had all questions answered.  Patient voices understanding and agrees to plans along with risks and benefits of plan. Patient is instructed to continue prior meds, diet, and exercise plans as instructed. Patient agrees to follow up as instructed and sooner if needed.  Patient agrees to go to ER if condition becomes emergent.      EMR Dragon/transcription disclaimer: Some of this encounter note is an electronic transcription/translation of spoken language to printed text. The electronic translation of spoken language may permit erroneous, or at times, nonsensical words or phrases to be inadvertently transcribed. Although I have reviewed the note for such errors, some may still exist.    Electronically signed by LAKEISHA Garcia on 12/4/2024 at 10:35 AM

## 2024-12-30 ENCOUNTER — TELEPHONE (OUTPATIENT)
Dept: FAMILY MEDICINE CLINIC | Age: 79
End: 2024-12-30

## 2024-12-30 DIAGNOSIS — R53.1 LACK OF STRENGTH: ICD-10-CM

## 2024-12-30 DIAGNOSIS — R53.1 GENERAL WEAKNESS: Primary | ICD-10-CM

## 2024-12-30 NOTE — TELEPHONE ENCOUNTER
Patients daughter wanted to call and updated Misty Blas on a few things with Mr Fernando Chu and ask a few questions.     I did notify her that Misty was out this week with a family emergency and would not be back until next week possibly.    She stated that it was not emergent and did want to wait until Misty came back.    She said that Mr Chu is doing better. He has gained 10lbs and is feeling better.    She did want to ask if he could possibly go back on the propanolol. She said it helped so much with his tremors and that is his biggest complaint right now. She also wanted to know if he could possibly get an order for physical therapy for strength training or something.     Will send to provider when she returns to clinic.

## 2025-01-02 ENCOUNTER — OFFICE VISIT (OUTPATIENT)
Dept: FAMILY MEDICINE CLINIC | Age: 80
End: 2025-01-02

## 2025-01-02 ENCOUNTER — TELEPHONE (OUTPATIENT)
Dept: HEMATOLOGY | Age: 80
End: 2025-01-02

## 2025-01-02 VITALS
TEMPERATURE: 97.2 F | WEIGHT: 142 LBS | DIASTOLIC BLOOD PRESSURE: 75 MMHG | HEART RATE: 75 BPM | OXYGEN SATURATION: 90 % | SYSTOLIC BLOOD PRESSURE: 115 MMHG | BODY MASS INDEX: 21.59 KG/M2

## 2025-01-02 DIAGNOSIS — R05.1 ACUTE COUGH: ICD-10-CM

## 2025-01-02 DIAGNOSIS — J40 BRONCHITIS: ICD-10-CM

## 2025-01-02 DIAGNOSIS — R09.89 CHEST CONGESTION: ICD-10-CM

## 2025-01-02 DIAGNOSIS — J01.90 ACUTE BACTERIAL SINUSITIS: Primary | ICD-10-CM

## 2025-01-02 DIAGNOSIS — B96.89 ACUTE BACTERIAL SINUSITIS: Primary | ICD-10-CM

## 2025-01-02 LAB
INFLUENZA A ANTIGEN, POC: NEGATIVE
INFLUENZA B ANTIGEN, POC: POSITIVE
LOT EXPIRE DATE: ABNORMAL
LOT KIT NUMBER: ABNORMAL
SARS-COV-2 RNA, POC: POSITIVE
VALID INTERNAL CONTROL: ABNORMAL
VENDOR AND KIT NAME POC: ABNORMAL

## 2025-01-02 RX ORDER — TRIAMCINOLONE ACETONIDE 40 MG/ML
40 INJECTION, SUSPENSION INTRA-ARTICULAR; INTRAMUSCULAR ONCE
Status: COMPLETED | OUTPATIENT
Start: 2025-01-02 | End: 2025-01-02

## 2025-01-02 RX ORDER — CEFTRIAXONE 1 G/1
1000 INJECTION, POWDER, FOR SOLUTION INTRAMUSCULAR; INTRAVENOUS ONCE
Status: COMPLETED | OUTPATIENT
Start: 2025-01-02 | End: 2025-01-02

## 2025-01-02 RX ADMIN — CEFTRIAXONE 1000 MG: 1 INJECTION, POWDER, FOR SOLUTION INTRAMUSCULAR; INTRAVENOUS at 12:13

## 2025-01-02 RX ADMIN — TRIAMCINOLONE ACETONIDE 40 MG: 40 INJECTION, SUSPENSION INTRA-ARTICULAR; INTRAMUSCULAR at 12:14

## 2025-01-02 ASSESSMENT — PATIENT HEALTH QUESTIONNAIRE - PHQ9
SUM OF ALL RESPONSES TO PHQ QUESTIONS 1-9: 0
5. POOR APPETITE OR OVEREATING: NOT AT ALL
10. IF YOU CHECKED OFF ANY PROBLEMS, HOW DIFFICULT HAVE THESE PROBLEMS MADE IT FOR YOU TO DO YOUR WORK, TAKE CARE OF THINGS AT HOME, OR GET ALONG WITH OTHER PEOPLE: NOT DIFFICULT AT ALL
1. LITTLE INTEREST OR PLEASURE IN DOING THINGS: NOT AT ALL
4. FEELING TIRED OR HAVING LITTLE ENERGY: NOT AT ALL
3. TROUBLE FALLING OR STAYING ASLEEP: NOT AT ALL
9. THOUGHTS THAT YOU WOULD BE BETTER OFF DEAD, OR OF HURTING YOURSELF: NOT AT ALL
8. MOVING OR SPEAKING SO SLOWLY THAT OTHER PEOPLE COULD HAVE NOTICED. OR THE OPPOSITE, BEING SO FIGETY OR RESTLESS THAT YOU HAVE BEEN MOVING AROUND A LOT MORE THAN USUAL: NOT AT ALL
SUM OF ALL RESPONSES TO PHQ QUESTIONS 1-9: 0
2. FEELING DOWN, DEPRESSED OR HOPELESS: NOT AT ALL
6. FEELING BAD ABOUT YOURSELF - OR THAT YOU ARE A FAILURE OR HAVE LET YOURSELF OR YOUR FAMILY DOWN: NOT AT ALL
SUM OF ALL RESPONSES TO PHQ9 QUESTIONS 1 & 2: 0
SUM OF ALL RESPONSES TO PHQ QUESTIONS 1-9: 0
7. TROUBLE CONCENTRATING ON THINGS, SUCH AS READING THE NEWSPAPER OR WATCHING TELEVISION: NOT AT ALL
SUM OF ALL RESPONSES TO PHQ QUESTIONS 1-9: 0

## 2025-01-02 ASSESSMENT — ENCOUNTER SYMPTOMS
EYE DISCHARGE: 0
WHEEZING: 0
BLOOD IN STOOL: 0
DIARRHEA: 0
RHINORRHEA: 1
CHOKING: 0
SINUS PAIN: 1
SORE THROAT: 0
COUGH: 1
EYE REDNESS: 0
CONSTIPATION: 0
SINUS PRESSURE: 1

## 2025-01-02 NOTE — TELEPHONE ENCOUNTER

## 2025-01-07 RX ORDER — PROPRANOLOL HCL 20 MG
20 TABLET ORAL 2 TIMES DAILY
Qty: 60 TABLET | Refills: 3 | Status: SHIPPED | OUTPATIENT
Start: 2025-01-07

## 2025-01-07 NOTE — TELEPHONE ENCOUNTER
I have sent in a lower dose of the propranolol.  I want to gradually increase if needed to prevent his BP from lowering.    Propranolol 20 mg BID has been sent to the pharmacy.

## 2025-01-07 NOTE — TELEPHONE ENCOUNTER
I agree with PT.  Ok to place order for generalized weakness, abnormal gait, and back pain.  If patient was doing PT at [facility] for cardiac rehab he can return there, but I do feel like Eric has a good program here in [facility] as well.

## 2025-01-07 NOTE — TELEPHONE ENCOUNTER
Daughter aware she states pt cardiac rehab is ending and feels that pt needs to do PT for strength training - if approved she would like Lino opinion on where pt may feel more comfortable ([facility]?)

## 2025-01-09 ENCOUNTER — TELEPHONE (OUTPATIENT)
Dept: INTERNAL MEDICINE | Facility: CLINIC | Age: 80
End: 2025-01-09
Payer: MEDICARE

## 2025-01-09 ENCOUNTER — OFFICE VISIT (OUTPATIENT)
Dept: UROLOGY | Facility: CLINIC | Age: 80
End: 2025-01-09
Payer: MEDICARE

## 2025-01-09 VITALS — TEMPERATURE: 98.1 F | WEIGHT: 154 LBS | BODY MASS INDEX: 22.05 KG/M2 | HEIGHT: 70 IN

## 2025-01-09 DIAGNOSIS — N40.1 BENIGN PROSTATIC HYPERPLASIA WITH LOWER URINARY TRACT SYMPTOMS, SYMPTOM DETAILS UNSPECIFIED: ICD-10-CM

## 2025-01-09 DIAGNOSIS — N43.2 OTHER HYDROCELE: ICD-10-CM

## 2025-01-09 DIAGNOSIS — R31.0 GROSS HEMATURIA: Primary | ICD-10-CM

## 2025-01-09 DIAGNOSIS — F41.9 ANXIETY: ICD-10-CM

## 2025-01-09 PROCEDURE — 87086 URINE CULTURE/COLONY COUNT: CPT

## 2025-01-09 RX ORDER — HYDROXYZINE HYDROCHLORIDE 25 MG/1
25 TABLET, FILM COATED ORAL 3 TIMES DAILY
Qty: 90 TABLET | Refills: 11 | Status: SHIPPED | OUTPATIENT
Start: 2025-01-09

## 2025-01-09 RX ORDER — PROPRANOLOL HCL 20 MG
1 TABLET ORAL 2 TIMES DAILY
COMMUNITY
Start: 2025-01-07

## 2025-01-09 NOTE — PROGRESS NOTES
Subjective    Mr. Rodriguez is 79 y.o. male    Chief Complaint: Gross Hematuria/Hydrocele     History of Present Illness  79-year-old male established patient with known history of enlarged prostate and chronic right-sided hydrocele. Patient is here today due to gross hematuria and swelling of his right hydrocele. He was seen recently after one episode of gross hematuria that started after cardiac rehab and resolved soon after. The current episode started a couple days ago, he has now finished with cardiac rehab. He states that he is more concerned with his right hydrocele and thinks that it may be contributing to his issues. He states that it has started to get in the way and is bothersome. He denies pain but states that it feels heavy.  LUTS are well controlled on finasteride and tamsulosin.  He is no longer using the tadalafil.  He had scrotal ultrasound done at [facility] on 7/30/2024 demonstrating known right-sided simple hydrocele, smaller left-sided hydrocele, normal-appearing testicles without mass. UA with numerous/too many to count RBC seen on HPF. Urine dipstick unable to process, will send for culture.         The following portions of the patient's history were reviewed and updated as appropriate: allergies, current medications, past family history, past medical history, past social history, past surgical history and problem list.    Review of Systems      Current Outpatient Medications:     amoxicillin-clavulanate (AUGMENTIN) 875-125 MG per tablet, Take 1 tablet by mouth 2 (Two) Times a Day., Disp: , Rfl:     atorvastatin (LIPITOR) 20 MG tablet, Take 1 tablet by mouth Every Night., Disp: , Rfl:     B Complex-C (SUPER B COMPLEX PO), Take  by mouth., Disp: , Rfl:     cloNIDine (CATAPRES) 0.1 MG tablet, TAKE ONE TABLET BY MOUTH EVERY TWELVE HOURS AS NEEDED, Disp: , Rfl:     clopidogrel (PLAVIX) 75 MG tablet, Take 1 tablet by mouth Daily., Disp: , Rfl:     Coenzyme Q10 200 MG capsule, Take 200 mg by mouth Daily.,  Disp: , Rfl:     donepezil (ARICEPT) 10 MG tablet, Take 1 tablet by mouth Every Night., Disp: , Rfl:     ferrous sulfate dried ER (Slow Release Iron) 45 MG tablet controlled-release tablet, Take 140 mg by mouth Daily., Disp: , Rfl:     finasteride (PROSCAR) 5 MG tablet, Take 1 tablet by mouth Daily., Disp: 90 tablet, Rfl: 4    hydrOXYzine (ATARAX) 25 MG tablet, Take 1 tablet by mouth 3 (Three) Times a Day., Disp: , Rfl:     levocetirizine (XYZAL) 5 MG tablet, Take 1 tablet by mouth every night at bedtime., Disp: , Rfl:     Mouthwashes (Biotene dry mouth) liquid liquid, 15 mL., Disp: , Rfl:     multivitamin (THERAGRAN) tablet tablet, Take  by mouth Daily., Disp: , Rfl:     Omega-3 Fatty Acids (FISH OIL) 1000 MG capsule capsule, Take  by mouth Daily With Breakfast., Disp: , Rfl:     omeprazole (priLOSEC) 20 MG capsule, Take 1 capsule by mouth., Disp: , Rfl:     PARoxetine (PAXIL) 20 MG tablet, Take 1 tablet by mouth Daily., Disp: , Rfl:     propranolol (INDERAL) 20 MG tablet, Take 1 tablet by mouth 2 (Two) Times a Day., Disp: , Rfl:     tamsulosin (FLOMAX) 0.4 MG capsule 24 hr capsule, Take 1 capsule by mouth Every Night. nightly, Disp: 90 capsule, Rfl: 4    vitamin D (ERGOCALCIFEROL) 1.25 MG (71416 UT) capsule capsule, Take 1 capsule by mouth 1 (One) Time Per Week., Disp: , Rfl:     lisinopril (PRINIVIL,ZESTRIL) 10 MG tablet, Take 1 tablet by mouth Daily. (Patient not taking: Reported on 1/9/2025), Disp: , Rfl:     propranolol (INDERAL) 40 MG tablet, Take 1 tablet by mouth 2 (Two) Times a Day. (Patient not taking: Reported on 1/9/2025), Disp: , Rfl:     Past Medical History:   Diagnosis Date    BPH with urinary obstruction     Colon cancer     Elevated PSA     Frequency of urination     Hydrocele     Hyperlipemia     Peritonitis     Testalgia        Past Surgical History:   Procedure Laterality Date    APPENDECTOMY      COLONOSCOPY      FOOT SURGERY      HERNIA REPAIR      PROSTATE BIOPSY         Social History  "    Socioeconomic History    Marital status:    Tobacco Use    Smoking status: Never     Passive exposure: Never    Smokeless tobacco: Never   Vaping Use    Vaping status: Never Used   Substance and Sexual Activity    Alcohol use: Yes    Drug use: Never    Sexual activity: Defer       Family History   Problem Relation Age of Onset    No Known Problems Father     No Known Problems Mother        Objective    Temp 98.1 °F (36.7 °C) (Infrared)   Ht 177.8 cm (70\")   Wt 69.9 kg (154 lb)   BMI 22.10 kg/m²     Physical Exam  Constitutional:No apparent distress; vitals reviewed as above  Psychiatric: Appropriate affect; alert and oriented  Musculoskeletal: Normal gait and station  Respiratory: No distress; unlabored movement; no accessory musculature needed with symmetric movements  Skin: No pallor or diaphoresis  Large right hydrocele palpable, no hard mass identified, unable to palpate testicle. No erythema noted.       Results for orders placed or performed in visit on 12/03/24   POC Urinalysis Dipstick, Multipro    Collection Time: 12/03/24 10:18 AM    Specimen: Urine   Result Value Ref Range    Color Yellow Yellow, Straw, Dark Yellow, Cori    Clarity, UA Clear Clear    Glucose, UA Negative Negative mg/dL    Bilirubin Negative Negative    Ketones, UA Negative Negative    Specific Gravity  1.020 1.005 - 1.030    Blood, UA Moderate (A) Negative    pH, Urine 7.0 5.0 - 8.0    Protein, POC Negative Negative mg/dL    Urobilinogen, UA Normal Normal, 0.2 E.U./dL    Nitrite, UA Negative Negative    Leukocytes Trace (A) Negative     Assessment and Plan    Diagnoses and all orders for this visit:    1. Gross hematuria (Primary)  -     POC Urinalysis Dipstick, Multipro  -     Urine Culture - Urine, Urine, Random Void  -     CT Abdomen Pelvis With & Without Contrast; Future    2. Benign prostatic hyperplasia with lower urinary tract symptoms, symptom details unspecified    3. Other hydrocele    Patient concerned with " bothersome hydrocele and considering removal. Discussed with Dr. Hudson who would consider removal and would speak to patient but will need to address gross hematuria as the priority. Patient with recent cardiac stent placement and unable to stop Plavix so unable to proceed with cystoscopy at this time. Will have patient complete CT urogram. Patient should continue Tamsulosin and Finasteride and encourage scrotal supporting underwear.  Will further determine follow up and plan based on CT results.

## 2025-01-09 NOTE — TELEPHONE ENCOUNTER
FOR DR. ALONSO AND GENA  PATIENT IS HAVING BLOOD IN HIS URINE, JUST STARTED LAST NIGHT. HIS HYDROCELE IS SWOLLEN SOFTBALL SIZE HE SAID.    PLEASE ADVISE  [phone]

## 2025-01-09 NOTE — TELEPHONE ENCOUNTER
Received fax from pharmacy requesting refill on pts medication(s). Pt was last seen in office on 1/2/2025 and has a follow up scheduled for 1/15/2025. Will send request to LAKEISHA Garcia for authorization.     Requested Prescriptions     Pending Prescriptions Disp Refills    hydrOXYzine HCl (ATARAX) 25 MG tablet [Pharmacy Med Name: hydroxyzine HCl 25 mg tablet] 90 tablet 11     Sig: TAKE ONE TABLET BY MOUTH THREE TIMES DAILY

## 2025-01-11 LAB — BACTERIA SPEC AEROBE CULT: NO GROWTH

## 2025-01-13 ENCOUNTER — TELEPHONE (OUTPATIENT)
Dept: UROLOGY | Facility: CLINIC | Age: 80
End: 2025-01-13
Payer: MEDICARE

## 2025-01-13 NOTE — TELEPHONE ENCOUNTER
----- Message from Fadia MCCRAY sent at 1/13/2025  8:08 AM CST -----    ----- Message -----  From: Alana Stafford APRN  Sent: 1/13/2025   8:05 AM CST  To: [facility] Clinical Pool    Urine culture shows no growth. Patient still needs to schedule CT urogram. Thank you

## 2025-01-13 NOTE — TELEPHONE ENCOUNTER
Spoke with patient, made him aware of results and he is still waiting for a call about CT, I will let front office know so we can get this scheduled.

## 2025-01-13 NOTE — TELEPHONE ENCOUNTER
Called patient and transferred him to central scheduling to set up his CT appointment needed prior to his appointment scheduled for 1/23/25.

## 2025-01-13 NOTE — TELEPHONE ENCOUNTER
----- Message from Alessandra OLSON sent at 1/13/2025  8:54 AM CST -----  Patient is needing a CT urogram completed per MAYANK Prince, patient stated he has not heard anything yet but has been watching for a call to schedule. Thank you!

## 2025-01-15 ENCOUNTER — OFFICE VISIT (OUTPATIENT)
Age: 80
End: 2025-01-15
Payer: MEDICARE

## 2025-01-15 VITALS
DIASTOLIC BLOOD PRESSURE: 68 MMHG | HEIGHT: 68 IN | SYSTOLIC BLOOD PRESSURE: 118 MMHG | HEART RATE: 70 BPM | OXYGEN SATURATION: 98 % | TEMPERATURE: 97.5 F | BODY MASS INDEX: 23.34 KG/M2 | WEIGHT: 154 LBS

## 2025-01-15 DIAGNOSIS — N43.3 HYDROCELE, UNSPECIFIED HYDROCELE TYPE: ICD-10-CM

## 2025-01-15 DIAGNOSIS — F41.9 ANXIETY: ICD-10-CM

## 2025-01-15 DIAGNOSIS — D64.9 ANEMIA, UNSPECIFIED TYPE: Primary | ICD-10-CM

## 2025-01-15 DIAGNOSIS — G25.0 BENIGN ESSENTIAL TREMOR: ICD-10-CM

## 2025-01-15 LAB — HGB, POC: 12.9 G/DL

## 2025-01-15 PROCEDURE — 1160F RVW MEDS BY RX/DR IN RCRD: CPT | Performed by: NURSE PRACTITIONER

## 2025-01-15 PROCEDURE — 99214 OFFICE O/P EST MOD 30 MIN: CPT | Performed by: NURSE PRACTITIONER

## 2025-01-15 PROCEDURE — 85018 HEMOGLOBIN: CPT | Performed by: NURSE PRACTITIONER

## 2025-01-15 PROCEDURE — 1123F ACP DISCUSS/DSCN MKR DOCD: CPT | Performed by: NURSE PRACTITIONER

## 2025-01-15 PROCEDURE — 1159F MED LIST DOCD IN RCRD: CPT | Performed by: NURSE PRACTITIONER

## 2025-01-15 SDOH — ECONOMIC STABILITY: FOOD INSECURITY: WITHIN THE PAST 12 MONTHS, THE FOOD YOU BOUGHT JUST DIDN'T LAST AND YOU DIDN'T HAVE MONEY TO GET MORE.: NEVER TRUE

## 2025-01-15 SDOH — ECONOMIC STABILITY: FOOD INSECURITY: WITHIN THE PAST 12 MONTHS, YOU WORRIED THAT YOUR FOOD WOULD RUN OUT BEFORE YOU GOT MONEY TO BUY MORE.: NEVER TRUE

## 2025-01-15 ASSESSMENT — ENCOUNTER SYMPTOMS
RESPIRATORY NEGATIVE: 1
EYES NEGATIVE: 1
GASTROINTESTINAL NEGATIVE: 1

## 2025-01-15 NOTE — PROGRESS NOTES
(ERGOCALCIFEROL) 1.25 MG (64149 UT) CAPS capsule TAKE ONE CAPSULE BY MOUTH EVERY WEEK Yes Misty Blas APRN   B Complex-C (B COMPLEX-VITAMIN C PO) Take by mouth Yes ProviderPino MD   finasteride (PROSCAR) 5 MG tablet Take 1 tablet by mouth daily Yes Provider, MD Pino   aspirin 81 MG tablet Take 1 tablet by mouth daily Yes Provider, MD Pino   Omega-3 Fatty Acids (OMEGA 3 PO) Take 1 tablet by mouth daily. Yes Provider, MD Pino   tamsulosin (FLOMAX) 0.4 MG capsule Take 1 capsule by mouth daily Yes Provider, MD Pino   Coenzyme Q10 (CO Q 10 PO) Take 200 mg by mouth daily. 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 has been removed       Past Surgical History:   Procedure Laterality Date    CARDIAC PROCEDURE N/A 10/10/2024    Left heart cath / coronary angiography performed by Ronan Carr MD at [facility] CARDIAC CATH LAB    CARDIAC PROCEDURE N/A 10/10/2024    Percutaneous coronary intervention performed by Ronan Carr MD at [facility] CARDIAC CATH LAB    CARDIAC PROCEDURE N/A 9/19/2024    Left heart cath / coronary angiography performed by Ronan Carr MD at [facility] CARDIAC CATH LAB    CARDIAC PROCEDURE N/A 9/19/2024    Percutaneous coronary intervention performed by Ronan Carr MD at [facility] CARDIAC CATH LAB    COLON SURGERY  12/06/2010    resection CA    COLONOSCOPY  11/25/2013    Dr. Odonnell    COLONOSCOPY  01/18/2016    Dr. Odonnell    FRACTURE SURGERY  01/01/1990    left foot    HERNIA REPAIR  11/15/2004    Dr. Valdez    OTHER SURGICAL HISTORY      umbilical surgery when he was 11       Family

## 2025-01-20 DIAGNOSIS — K29.80 DUODENITIS: ICD-10-CM

## 2025-01-20 NOTE — TELEPHONE ENCOUNTER
Received fax from pharmacy requesting refill on pts medication(s). Pt was last seen in office on 1/2/2025  and has a follow up scheduled for Visit date not found. Will send request to  Misty Blas  for patient.     Requested Prescriptions     Pending Prescriptions Disp Refills    omeprazole (PRILOSEC) 20 MG delayed release capsule [Pharmacy Med Name: omeprazole 20 mg capsule,delayed release] 30 capsule 3     Sig: TAKE ONE CAPSULE BY MOUTH EVERY MORNING BEFORE BREAKFAST

## 2025-01-22 NOTE — PROGRESS NOTES
Subjective    Mr. Rodriguez is 79 y.o. male    Chief Complaint: Hydrocele    History of Present Illness    78yo male established patient known to me for history of enlarged prostate and right hydrocele here for new problem of 1.2 cm left proximal ureteral stone identified on CT urogram for history of intermittent gross hematuria.  He denies current flank pain.  I reviewed his CT urogram done on 1/27/2025 demonstrating left proximal ureteral stone with multiple other incidental findings regarding the liver and SMV which will require further follow-up MRI abdomen, markedly enlarged prostate.  He is now on aspirin and Plavix for history of coronary stenting last year.  Previous scrotal ultrasound done last summer reviewed by me showing simple appearing right greater than left hydroceles, normal testicles bilaterally.  LUTS are well controlled on finasteride and tamsulosin.  He is no longer using the tadalafil.        I independently visualized and reviewed the patient's prior imaging studies today in clinic and discussed the imaging findings with the patient.    The following portions of the patient's history were reviewed and updated as appropriate: allergies, current medications, past family history, past medical history, past social history, past surgical history and problem list.    Review of Systems      Current Outpatient Medications:     atorvastatin (LIPITOR) 20 MG tablet, Take 1 tablet by mouth Every Night., Disp: , Rfl:     B Complex-C (SUPER B COMPLEX PO), Take  by mouth., Disp: , Rfl:     cloNIDine (CATAPRES) 0.1 MG tablet, TAKE ONE TABLET BY MOUTH EVERY TWELVE HOURS AS NEEDED, Disp: , Rfl:     clopidogrel (PLAVIX) 75 MG tablet, Take 1 tablet by mouth Daily., Disp: , Rfl:     Coenzyme Q10 200 MG capsule, Take 200 mg by mouth Daily., Disp: , Rfl:     donepezil (ARICEPT) 10 MG tablet, Take 1 tablet by mouth Every Night., Disp: , Rfl:     ferrous sulfate dried ER (Slow Release Iron) 45 MG tablet  controlled-release tablet, Take 140 mg by mouth Daily., Disp: , Rfl:     finasteride (PROSCAR) 5 MG tablet, Take 1 tablet by mouth Daily., Disp: 90 tablet, Rfl: 4    hydrOXYzine (ATARAX) 25 MG tablet, Take 1 tablet by mouth 3 (Three) Times a Day., Disp: , Rfl:     levocetirizine (XYZAL) 5 MG tablet, Take 1 tablet by mouth every night at bedtime., Disp: , Rfl:     lisinopril (PRINIVIL,ZESTRIL) 10 MG tablet, Take 1 tablet by mouth Daily., Disp: , Rfl:     Mouthwashes (Biotene dry mouth) liquid liquid, 15 mL., Disp: , Rfl:     multivitamin (THERAGRAN) tablet tablet, Take  by mouth Daily., Disp: , Rfl:     Omega-3 Fatty Acids (FISH OIL) 1000 MG capsule capsule, Take  by mouth Daily With Breakfast., Disp: , Rfl:     omeprazole (priLOSEC) 20 MG capsule, Take 1 capsule by mouth., Disp: , Rfl:     PARoxetine (PAXIL) 20 MG tablet, Take 1 tablet by mouth Daily., Disp: , Rfl:     propranolol (INDERAL) 20 MG tablet, Take 1 tablet by mouth 2 (Two) Times a Day., Disp: , Rfl:     propranolol (INDERAL) 40 MG tablet, Take 1 tablet by mouth 2 (Two) Times a Day., Disp: , Rfl:     tamsulosin (FLOMAX) 0.4 MG capsule 24 hr capsule, Take 1 capsule by mouth Every Night. nightly, Disp: 90 capsule, Rfl: 4    vitamin D (ERGOCALCIFEROL) 1.25 MG (40365 UT) capsule capsule, Take 1 capsule by mouth 1 (One) Time Per Week., Disp: , Rfl:     Past Medical History:   Diagnosis Date    BPH with urinary obstruction     Colon cancer     Elevated PSA     Frequency of urination     Hydrocele     Hyperlipemia     Peritonitis     Testalgia        Past Surgical History:   Procedure Laterality Date    APPENDECTOMY      COLONOSCOPY      FOOT SURGERY      HERNIA REPAIR      PROSTATE BIOPSY         Social History     Socioeconomic History    Marital status:    Tobacco Use    Smoking status: Never     Passive exposure: Never    Smokeless tobacco: Never   Vaping Use    Vaping status: Never Used   Substance and Sexual Activity    Alcohol use: Yes    Drug use:  "Never    Sexual activity: Defer       Family History   Problem Relation Age of Onset    No Known Problems Father     No Known Problems Mother        Objective    Temp 98.2 °F (36.8 °C)   Ht 177.8 cm (70\")   Wt 69.9 kg (154 lb)   BMI 22.10 kg/m²     Physical Exam        Results for orders placed or performed in visit on 01/30/25   POC Urinalysis Dipstick, Multipro    Collection Time: 01/30/25  1:00 PM    Specimen: Urine   Result Value Ref Range    Color Yellow Yellow, Straw, Dark Yellow, Cori    Clarity, UA Clear Clear    Glucose, UA Negative Negative mg/dL    Bilirubin Negative Negative    Ketones, UA Negative Negative    Specific Gravity  1.020 1.005 - 1.030    Blood, UA Large (A) Negative    pH, Urine 7.5 5.0 - 8.0    Protein, POC 30 mg/dL (A) Negative mg/dL    Urobilinogen, UA Normal Normal, 0.2 E.U./dL    Nitrite, UA Negative Negative    Leukocytes Negative Negative     Assessment and Plan    Diagnoses and all orders for this visit:    1. Left ureteral stone (Primary)  -     Case Request; Standing  -     ceFAZolin (ANCEF) 2,000 mg in sodium chloride 0.9 % 100 mL IVPB  -     CBC (No Diff); Future  -     Basic Metabolic Panel; Future  -     Case Request    2. Other hydrocele  -     POC Urinalysis Dipstick, Multipro    Other orders  -     Follow Anesthesia Guidelines / Protocol; Future  -     Follow Anesthesia Guidelines / Protocol; Standing  -     Verify / Perform Chlorhexidine Skin Prep; Standing  -     Provide NPO Instructions to Patient; Future  -     Chlorhexidine Skin Prep; Future  -     Place Sequential Compression Device; Standing  -     Maintain Sequential Compression Device; Standing      Large 1.2 cm left proximal ureteral stone.  We discussed that this is likely the cause of his intermittent gross hematuria.  In lieu  of office cystoscopy, I recommended scheduling left ureteroscopy laser lithotripsy basket stone extraction for treatment of this large stone as it was too large to pass and carries " risk of renal colic, obstructed infection, worsening renal function, and death.  He would like to proceed with left ureteroscopy next Tuesday, 2/4/2025.  We discussed risks of the procedure including but not limited to infection, bleeding, need for additional procedures, injury to the ureter and/or kidney, inability to remove any/all stone, need for postop ureteral stent, complications of anesthesia.  He will need to stay on his aspirin and Plavix.  Observe hydrocele for now given his anticoagulation. He will need  further radiographic follow-up regarding his incidental liver and SMV findings on the CT urogram      This document has been signed by ELEANOR Hudson MD on January 30, 2025 13:22 CST

## 2025-01-22 NOTE — H&P (VIEW-ONLY)
Subjective    Mr. Rodriguez is 79 y.o. male    Chief Complaint: Hydrocele    History of Present Illness    78yo male established patient known to me for history of enlarged prostate and right hydrocele here for new problem of 1.2 cm left proximal ureteral stone identified on CT urogram for history of intermittent gross hematuria.  He denies current flank pain.  I reviewed his CT urogram done on 1/27/2025 demonstrating left proximal ureteral stone with multiple other incidental findings regarding the liver and SMV which will require further follow-up MRI abdomen, markedly enlarged prostate.  He is now on aspirin and Plavix for history of coronary stenting last year.  Previous scrotal ultrasound done last summer reviewed by me showing simple appearing right greater than left hydroceles, normal testicles bilaterally.  LUTS are well controlled on finasteride and tamsulosin.  He is no longer using the tadalafil.        I independently visualized and reviewed the patient's prior imaging studies today in clinic and discussed the imaging findings with the patient.    The following portions of the patient's history were reviewed and updated as appropriate: allergies, current medications, past family history, past medical history, past social history, past surgical history and problem list.    Review of Systems      Current Outpatient Medications:     atorvastatin (LIPITOR) 20 MG tablet, Take 1 tablet by mouth Every Night., Disp: , Rfl:     B Complex-C (SUPER B COMPLEX PO), Take  by mouth., Disp: , Rfl:     cloNIDine (CATAPRES) 0.1 MG tablet, TAKE ONE TABLET BY MOUTH EVERY TWELVE HOURS AS NEEDED, Disp: , Rfl:     clopidogrel (PLAVIX) 75 MG tablet, Take 1 tablet by mouth Daily., Disp: , Rfl:     Coenzyme Q10 200 MG capsule, Take 200 mg by mouth Daily., Disp: , Rfl:     donepezil (ARICEPT) 10 MG tablet, Take 1 tablet by mouth Every Night., Disp: , Rfl:     ferrous sulfate dried ER (Slow Release Iron) 45 MG tablet  controlled-release tablet, Take 140 mg by mouth Daily., Disp: , Rfl:     finasteride (PROSCAR) 5 MG tablet, Take 1 tablet by mouth Daily., Disp: 90 tablet, Rfl: 4    hydrOXYzine (ATARAX) 25 MG tablet, Take 1 tablet by mouth 3 (Three) Times a Day., Disp: , Rfl:     levocetirizine (XYZAL) 5 MG tablet, Take 1 tablet by mouth every night at bedtime., Disp: , Rfl:     lisinopril (PRINIVIL,ZESTRIL) 10 MG tablet, Take 1 tablet by mouth Daily., Disp: , Rfl:     Mouthwashes (Biotene dry mouth) liquid liquid, 15 mL., Disp: , Rfl:     multivitamin (THERAGRAN) tablet tablet, Take  by mouth Daily., Disp: , Rfl:     Omega-3 Fatty Acids (FISH OIL) 1000 MG capsule capsule, Take  by mouth Daily With Breakfast., Disp: , Rfl:     omeprazole (priLOSEC) 20 MG capsule, Take 1 capsule by mouth., Disp: , Rfl:     PARoxetine (PAXIL) 20 MG tablet, Take 1 tablet by mouth Daily., Disp: , Rfl:     propranolol (INDERAL) 20 MG tablet, Take 1 tablet by mouth 2 (Two) Times a Day., Disp: , Rfl:     propranolol (INDERAL) 40 MG tablet, Take 1 tablet by mouth 2 (Two) Times a Day., Disp: , Rfl:     tamsulosin (FLOMAX) 0.4 MG capsule 24 hr capsule, Take 1 capsule by mouth Every Night. nightly, Disp: 90 capsule, Rfl: 4    vitamin D (ERGOCALCIFEROL) 1.25 MG (06655 UT) capsule capsule, Take 1 capsule by mouth 1 (One) Time Per Week., Disp: , Rfl:     Past Medical History:   Diagnosis Date    BPH with urinary obstruction     Colon cancer     Elevated PSA     Frequency of urination     Hydrocele     Hyperlipemia     Peritonitis     Testalgia        Past Surgical History:   Procedure Laterality Date    APPENDECTOMY      COLONOSCOPY      FOOT SURGERY      HERNIA REPAIR      PROSTATE BIOPSY         Social History     Socioeconomic History    Marital status:    Tobacco Use    Smoking status: Never     Passive exposure: Never    Smokeless tobacco: Never   Vaping Use    Vaping status: Never Used   Substance and Sexual Activity    Alcohol use: Yes    Drug use:  "Never    Sexual activity: Defer       Family History   Problem Relation Age of Onset    No Known Problems Father     No Known Problems Mother        Objective    Temp 98.2 °F (36.8 °C)   Ht 177.8 cm (70\")   Wt 69.9 kg (154 lb)   BMI 22.10 kg/m²     Physical Exam        Results for orders placed or performed in visit on 01/30/25   POC Urinalysis Dipstick, Multipro    Collection Time: 01/30/25  1:00 PM    Specimen: Urine   Result Value Ref Range    Color Yellow Yellow, Straw, Dark Yellow, Cori    Clarity, UA Clear Clear    Glucose, UA Negative Negative mg/dL    Bilirubin Negative Negative    Ketones, UA Negative Negative    Specific Gravity  1.020 1.005 - 1.030    Blood, UA Large (A) Negative    pH, Urine 7.5 5.0 - 8.0    Protein, POC 30 mg/dL (A) Negative mg/dL    Urobilinogen, UA Normal Normal, 0.2 E.U./dL    Nitrite, UA Negative Negative    Leukocytes Negative Negative     Assessment and Plan    Diagnoses and all orders for this visit:    1. Left ureteral stone (Primary)  -     Case Request; Standing  -     ceFAZolin (ANCEF) 2,000 mg in sodium chloride 0.9 % 100 mL IVPB  -     CBC (No Diff); Future  -     Basic Metabolic Panel; Future  -     Case Request    2. Other hydrocele  -     POC Urinalysis Dipstick, Multipro    Other orders  -     Follow Anesthesia Guidelines / Protocol; Future  -     Follow Anesthesia Guidelines / Protocol; Standing  -     Verify / Perform Chlorhexidine Skin Prep; Standing  -     Provide NPO Instructions to Patient; Future  -     Chlorhexidine Skin Prep; Future  -     Place Sequential Compression Device; Standing  -     Maintain Sequential Compression Device; Standing      Large 1.2 cm left proximal ureteral stone.  We discussed that this is likely the cause of his intermittent gross hematuria.  In lieu  of office cystoscopy, I recommended scheduling left ureteroscopy laser lithotripsy basket stone extraction for treatment of this large stone as it was too large to pass and carries " risk of renal colic, obstructed infection, worsening renal function, and death.  He would like to proceed with left ureteroscopy next Tuesday, 2/4/2025.  We discussed risks of the procedure including but not limited to infection, bleeding, need for additional procedures, injury to the ureter and/or kidney, inability to remove any/all stone, need for postop ureteral stent, complications of anesthesia.  He will need to stay on his aspirin and Plavix.  Observe hydrocele for now given his anticoagulation. He will need  further radiographic follow-up regarding his incidental liver and SMV findings on the CT urogram      This document has been signed by ELEANOR Hudson MD on January 30, 2025 13:22 CST

## 2025-01-27 ENCOUNTER — OFFICE VISIT (OUTPATIENT)
Dept: PRIMARY CARE CLINIC | Age: 80
End: 2025-01-27

## 2025-01-27 ENCOUNTER — HOSPITAL ENCOUNTER (OUTPATIENT)
Dept: CT IMAGING | Facility: HOSPITAL | Age: 80
Discharge: HOME | End: 2025-01-27
Payer: MEDICARE

## 2025-01-27 VITALS
BODY MASS INDEX: 23.57 KG/M2 | OXYGEN SATURATION: 99 % | HEART RATE: 75 BPM | DIASTOLIC BLOOD PRESSURE: 70 MMHG | WEIGHT: 155 LBS | SYSTOLIC BLOOD PRESSURE: 126 MMHG | TEMPERATURE: 98 F

## 2025-01-27 DIAGNOSIS — R31.0 GROSS HEMATURIA: ICD-10-CM

## 2025-01-27 DIAGNOSIS — Z20.822 EXPOSURE TO COVID-19 VIRUS: ICD-10-CM

## 2025-01-27 DIAGNOSIS — Z11.52 ENCOUNTER FOR SCREENING FOR COVID-19: Primary | ICD-10-CM

## 2025-01-27 DIAGNOSIS — Z11.59 SCREENING FOR VIRAL DISEASE: ICD-10-CM

## 2025-01-27 LAB
Lab: NORMAL
QC PASS/FAIL: NORMAL
SARS-COV-2, POC: NORMAL

## 2025-01-27 PROCEDURE — 25510000001 IOPAMIDOL 61 % SOLUTION

## 2025-01-27 PROCEDURE — 74178 CT ABD&PLV WO CNTR FLWD CNTR: CPT

## 2025-01-27 RX ORDER — IOPAMIDOL 612 MG/ML
100 INJECTION, SOLUTION INTRAVASCULAR
Status: COMPLETED | OUTPATIENT
Start: 2025-01-27 | End: 2025-01-27

## 2025-01-27 RX ADMIN — IOPAMIDOL 100 ML: 612 INJECTION, SOLUTION INTRAVENOUS at 07:57

## 2025-01-27 ASSESSMENT — ENCOUNTER SYMPTOMS
VOMITING: 0
RHINORRHEA: 0
ABDOMINAL PAIN: 0
DIARRHEA: 0
WHEEZING: 0
SHORTNESS OF BREATH: 0
NAUSEA: 0
SORE THROAT: 0
COUGH: 0

## 2025-01-27 NOTE — PATIENT INSTRUCTIONS
- COVID test negative.  - May return for retesting for development of any symptoms.  - Follow up with PCP if needed.

## 2025-01-27 NOTE — PROGRESS NOTES
[facility]  [facility] [address]  [address]  [place] [address]  Dept: [phone]  Dept Fax: [phone]  Loc: [phone]    Fernando Chu is a 79 y.o. male who presents today for his medical conditions/complaints as noted below.  Fernando Chu is c/o of Covid Testing        HPI:     Fernando Chu presents to the clinic requesting to be tested for COVID. Denies any symptoms. Reports he has a known recent exposure. Denies any OTC treatment.    Last antibiotic was 1/2/25 for sinus infection. No recent steroid administration.      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 has been removed     Past Surgical History:   Procedure Laterality Date    CARDIAC PROCEDURE N/A 10/10/2024    Left heart cath / coronary angiography performed by Ronan Carr MD at [facility] CARDIAC CATH LAB    CARDIAC PROCEDURE N/A 10/10/2024    Percutaneous coronary intervention performed by Ronan Carr MD at [facility] CARDIAC CATH LAB    CARDIAC PROCEDURE N/A 9/19/2024    Left heart cath / coronary angiography performed by Ronan Carr MD at [facility] CARDIAC CATH LAB    CARDIAC PROCEDURE N/A 9/19/2024    Percutaneous coronary intervention performed by Ronan Carr MD at [facility] CARDIAC CATH LAB    COLON SURGERY  12/06/2010    resection CA    COLONOSCOPY  11/25/2013    Dr. Odonnell    COLONOSCOPY  01/18/2016    Dr. Odonnell    FRACTURE SURGERY  01/01/1990    left foot    HERNIA REPAIR  11/15/2004    Dr. Valdez    OTHER SURGICAL HISTORY      umbilical surgery when he was 11       Family History   Problem Relation Age of Onset    Cancer Mother         breast    Heart Disease Father     Heart Disease Brother        Social History

## 2025-01-30 ENCOUNTER — OFFICE VISIT (OUTPATIENT)
Dept: UROLOGY | Facility: CLINIC | Age: 80
End: 2025-01-30
Payer: MEDICARE

## 2025-01-30 VITALS — BODY MASS INDEX: 22.05 KG/M2 | TEMPERATURE: 98.2 F | WEIGHT: 154 LBS | HEIGHT: 70 IN

## 2025-01-30 DIAGNOSIS — N43.2 OTHER HYDROCELE: ICD-10-CM

## 2025-01-30 DIAGNOSIS — N20.1 LEFT URETERAL STONE: Primary | ICD-10-CM

## 2025-01-30 LAB
BILIRUB BLD-MCNC: NEGATIVE MG/DL
CLARITY, POC: CLEAR
COLOR UR: YELLOW
GLUCOSE UR STRIP-MCNC: NEGATIVE MG/DL
KETONES UR QL: NEGATIVE
LEUKOCYTE EST, POC: NEGATIVE
NITRITE UR-MCNC: NEGATIVE MG/ML
PH UR: 7.5 [PH] (ref 5–8)
PROT UR STRIP-MCNC: ABNORMAL MG/DL
RBC # UR STRIP: ABNORMAL /UL
SP GR UR: 1.02 (ref 1–1.03)
UROBILINOGEN UR QL: NORMAL

## 2025-01-31 NOTE — SIGNIFICANT NOTE
01/31/2025 @ 1647; PAT call placed; spoke w/ Mr. Rodriguez to update medical information.  When we reached current Rx, we updated pt's Inderal dose & time.  When I explained NPO after midnight the DOS-(02/4/25), except take Inderal (propranolol) w/ sip of water, Mr. Rodriguez became upset.  He said Dr. Hudson had told him to take all his Rx the day of surgery.  I tried to explain current anesthesia protocols, & Mr. Rodriguez stated he did not want to hear it, & stated he was upset & was I done w/ this call.  I tried to ask two more PAT questions, Mr. Rodriguez ask if I was done now, & terminated the call. Vivian Josue RN

## 2025-02-04 ENCOUNTER — ANESTHESIA (OUTPATIENT)
Dept: PERIOP | Facility: HOSPITAL | Age: 80
End: 2025-02-04
Payer: MEDICARE

## 2025-02-04 ENCOUNTER — APPOINTMENT (OUTPATIENT)
Dept: GENERAL RADIOLOGY | Facility: HOSPITAL | Age: 80
End: 2025-02-04
Payer: MEDICARE

## 2025-02-04 ENCOUNTER — ANESTHESIA EVENT (OUTPATIENT)
Dept: PERIOP | Facility: HOSPITAL | Age: 80
End: 2025-02-04
Payer: MEDICARE

## 2025-02-04 ENCOUNTER — HOSPITAL ENCOUNTER (OUTPATIENT)
Facility: HOSPITAL | Age: 80
Discharge: HOME | End: 2025-02-06
Attending: UROLOGY | Admitting: UROLOGY
Payer: MEDICARE

## 2025-02-04 DIAGNOSIS — N20.1 LEFT URETERAL STONE: ICD-10-CM

## 2025-02-04 DIAGNOSIS — R31.0 GROSS HEMATURIA: Primary | ICD-10-CM

## 2025-02-04 PROBLEM — N40.1 BPH WITH URINARY OBSTRUCTION: Status: ACTIVE | Noted: 2025-02-04

## 2025-02-04 PROBLEM — N13.8 BPH WITH URINARY OBSTRUCTION: Status: ACTIVE | Noted: 2025-02-04

## 2025-02-04 PROCEDURE — 25010000002 CEFAZOLIN PER 500 MG: Performed by: UROLOGY

## 2025-02-04 PROCEDURE — 63710000001 HYDROCODONE-ACETAMINOPHEN 10-325 MG TABLET: Performed by: ANESTHESIOLOGY

## 2025-02-04 PROCEDURE — A9270 NON-COVERED ITEM OR SERVICE: HCPCS | Performed by: UROLOGY

## 2025-02-04 PROCEDURE — 85027 COMPLETE CBC AUTOMATED: CPT | Performed by: UROLOGY

## 2025-02-04 PROCEDURE — 63710000001 CETIRIZINE 10 MG TABLET: Performed by: UROLOGY

## 2025-02-04 PROCEDURE — 82360 CALCULUS ASSAY QUANT: CPT | Performed by: UROLOGY

## 2025-02-04 PROCEDURE — G0378 HOSPITAL OBSERVATION PER HR: HCPCS

## 2025-02-04 PROCEDURE — 25010000002 LIDOCAINE PF 2% 2 % SOLUTION: Performed by: NURSE ANESTHETIST, CERTIFIED REGISTERED

## 2025-02-04 PROCEDURE — 74420 UROGRAPHY RTRGR +-KUB: CPT | Performed by: UROLOGY

## 2025-02-04 PROCEDURE — A9270 NON-COVERED ITEM OR SERVICE: HCPCS | Performed by: ANESTHESIOLOGY

## 2025-02-04 PROCEDURE — 93010 ELECTROCARDIOGRAM REPORT: CPT | Performed by: HOSPITALIST

## 2025-02-04 PROCEDURE — C2617 STENT, NON-COR, TEM W/O DEL: HCPCS | Performed by: UROLOGY

## 2025-02-04 PROCEDURE — 63710000001 ASPIRIN 81 MG TABLET DELAYED-RELEASE: Performed by: UROLOGY

## 2025-02-04 PROCEDURE — C1758 CATHETER, URETERAL: HCPCS | Performed by: UROLOGY

## 2025-02-04 PROCEDURE — 25810000003 LACTATED RINGERS PER 1000 ML: Performed by: UROLOGY

## 2025-02-04 PROCEDURE — 63710000001 CLONIDINE 0.1 MG TABLET: Performed by: UROLOGY

## 2025-02-04 PROCEDURE — 25010000002 ONDANSETRON PER 1 MG: Performed by: UROLOGY

## 2025-02-04 PROCEDURE — C1769 GUIDE WIRE: HCPCS | Performed by: UROLOGY

## 2025-02-04 PROCEDURE — 25510000001 IOPAMIDOL 61 % SOLUTION: Performed by: UROLOGY

## 2025-02-04 PROCEDURE — 25010000002 FENTANYL CITRATE (PF) 50 MCG/ML SOLUTION: Performed by: ANESTHESIOLOGY

## 2025-02-04 PROCEDURE — 52356 CYSTO/URETERO W/LITHOTRIPSY: CPT | Performed by: UROLOGY

## 2025-02-04 PROCEDURE — 25010000002 DEXAMETHASONE PER 1 MG: Performed by: NURSE ANESTHETIST, CERTIFIED REGISTERED

## 2025-02-04 PROCEDURE — 25010000002 PROPOFOL 10 MG/ML EMULSION: Performed by: NURSE ANESTHETIST, CERTIFIED REGISTERED

## 2025-02-04 PROCEDURE — 80048 BASIC METABOLIC PNL TOTAL CA: CPT | Performed by: UROLOGY

## 2025-02-04 PROCEDURE — 88300 SURGICAL PATH GROSS: CPT | Performed by: UROLOGY

## 2025-02-04 PROCEDURE — 63710000001 DOCUSATE SODIUM 100 MG CAPSULE: Performed by: UROLOGY

## 2025-02-04 PROCEDURE — 93005 ELECTROCARDIOGRAM TRACING: CPT | Performed by: UROLOGY

## 2025-02-04 PROCEDURE — 74420 UROGRAPHY RTRGR +-KUB: CPT

## 2025-02-04 PROCEDURE — 63710000001 ATORVASTATIN 10 MG TABLET: Performed by: UROLOGY

## 2025-02-04 PROCEDURE — 63710000001 DONEPEZIL 10 MG TABLET: Performed by: UROLOGY

## 2025-02-04 PROCEDURE — 63710000001 PAROXETINE 20 MG TABLET: Performed by: UROLOGY

## 2025-02-04 PROCEDURE — 25010000002 ONDANSETRON PER 1 MG: Performed by: NURSE ANESTHETIST, CERTIFIED REGISTERED

## 2025-02-04 PROCEDURE — 25010000002 GLYCOPYRROLATE 0.4 MG/2ML SOLUTION: Performed by: NURSE ANESTHETIST, CERTIFIED REGISTERED

## 2025-02-04 PROCEDURE — 63710000001 HYOSCYAMINE 0.125 MG SUBLINGUAL TABLET: Performed by: UROLOGY

## 2025-02-04 PROCEDURE — 63710000001 SULFAMETHOXAZOLE-TRIMETHOPRIM 800-160 MG TABLET: Performed by: UROLOGY

## 2025-02-04 PROCEDURE — 63710000001 TAMSULOSIN 0.4 MG CAPSULE: Performed by: UROLOGY

## 2025-02-04 PROCEDURE — 63710000001 TRAMADOL 50 MG TABLET: Performed by: UROLOGY

## 2025-02-04 PROCEDURE — 25010000002 FENTANYL CITRATE (PF) 100 MCG/2ML SOLUTION: Performed by: NURSE ANESTHETIST, CERTIFIED REGISTERED

## 2025-02-04 DEVICE — URETERAL STENT WITH SIDE HOLES 6FX26CM
Type: IMPLANTABLE DEVICE | Site: URETER | Status: FUNCTIONAL
Brand: TRIA™ SOFT

## 2025-02-04 RX ORDER — CETIRIZINE HYDROCHLORIDE 10 MG/1
10 TABLET ORAL DAILY
Status: DISCONTINUED | OUTPATIENT
Start: 2025-02-04 | End: 2025-02-06 | Stop reason: HOSPADM

## 2025-02-04 RX ORDER — CLOPIDOGREL BISULFATE 75 MG/1
75 TABLET ORAL DAILY
Status: DISCONTINUED | OUTPATIENT
Start: 2025-02-05 | End: 2025-02-06 | Stop reason: HOSPADM

## 2025-02-04 RX ORDER — NEOSTIGMINE METHYLSULFATE 5 MG/5 ML
SYRINGE (ML) INTRAVENOUS AS NEEDED
Status: DISCONTINUED | OUTPATIENT
Start: 2025-02-04 | End: 2025-02-04 | Stop reason: SURG

## 2025-02-04 RX ORDER — LIDOCAINE HYDROCHLORIDE 20 MG/ML
INJECTION, SOLUTION EPIDURAL; INFILTRATION; INTRACAUDAL; PERINEURAL AS NEEDED
Status: DISCONTINUED | OUTPATIENT
Start: 2025-02-04 | End: 2025-02-04 | Stop reason: SURG

## 2025-02-04 RX ORDER — MAGNESIUM HYDROXIDE 1200 MG/15ML
LIQUID ORAL AS NEEDED
Status: DISCONTINUED | OUTPATIENT
Start: 2025-02-04 | End: 2025-02-04 | Stop reason: HOSPADM

## 2025-02-04 RX ORDER — SODIUM CHLORIDE 0.9 % (FLUSH) 0.9 %
3 SYRINGE (ML) INJECTION AS NEEDED
Status: DISCONTINUED | OUTPATIENT
Start: 2025-02-04 | End: 2025-02-04 | Stop reason: HOSPADM

## 2025-02-04 RX ORDER — PAROXETINE 20 MG/1
20 TABLET, FILM COATED ORAL DAILY
Status: DISCONTINUED | OUTPATIENT
Start: 2025-02-04 | End: 2025-02-06 | Stop reason: HOSPADM

## 2025-02-04 RX ORDER — GLYCOPYRROLATE 0.2 MG/ML
INJECTION INTRAMUSCULAR; INTRAVENOUS AS NEEDED
Status: DISCONTINUED | OUTPATIENT
Start: 2025-02-04 | End: 2025-02-04 | Stop reason: SURG

## 2025-02-04 RX ORDER — HYDROCODONE BITARTRATE AND ACETAMINOPHEN 10; 325 MG/1; MG/1
1 TABLET ORAL EVERY 4 HOURS PRN
Status: DISCONTINUED | OUTPATIENT
Start: 2025-02-04 | End: 2025-02-04 | Stop reason: HOSPADM

## 2025-02-04 RX ORDER — PROPRANOLOL HCL 20 MG
20 TABLET ORAL EVERY MORNING
Status: DISCONTINUED | OUTPATIENT
Start: 2025-02-05 | End: 2025-02-06 | Stop reason: HOSPADM

## 2025-02-04 RX ORDER — BUPIVACAINE HCL/0.9 % NACL/PF 0.125 %
PLASTIC BAG, INJECTION (ML) EPIDURAL AS NEEDED
Status: DISCONTINUED | OUTPATIENT
Start: 2025-02-04 | End: 2025-02-04 | Stop reason: SURG

## 2025-02-04 RX ORDER — ONDANSETRON 2 MG/ML
INJECTION INTRAMUSCULAR; INTRAVENOUS AS NEEDED
Status: DISCONTINUED | OUTPATIENT
Start: 2025-02-04 | End: 2025-02-04 | Stop reason: SURG

## 2025-02-04 RX ORDER — FENTANYL CITRATE 50 UG/ML
25 INJECTION, SOLUTION INTRAMUSCULAR; INTRAVENOUS
Status: DISCONTINUED | OUTPATIENT
Start: 2025-02-04 | End: 2025-02-04 | Stop reason: HOSPADM

## 2025-02-04 RX ORDER — ONDANSETRON 2 MG/ML
4 INJECTION INTRAMUSCULAR; INTRAVENOUS EVERY 6 HOURS PRN
Status: DISCONTINUED | OUTPATIENT
Start: 2025-02-04 | End: 2025-02-06 | Stop reason: HOSPADM

## 2025-02-04 RX ORDER — ASPIRIN 81 MG/1
81 TABLET ORAL DAILY
COMMUNITY

## 2025-02-04 RX ORDER — DONEPEZIL HYDROCHLORIDE 10 MG/1
10 TABLET, FILM COATED ORAL NIGHTLY
Status: DISCONTINUED | OUTPATIENT
Start: 2025-02-04 | End: 2025-02-06 | Stop reason: HOSPADM

## 2025-02-04 RX ORDER — EPHEDRINE SULFATE 50 MG/ML
INJECTION INTRAVENOUS AS NEEDED
Status: DISCONTINUED | OUTPATIENT
Start: 2025-02-04 | End: 2025-02-04 | Stop reason: SURG

## 2025-02-04 RX ORDER — DOCUSATE SODIUM 100 MG/1
100 CAPSULE, LIQUID FILLED ORAL 2 TIMES DAILY
Status: DISCONTINUED | OUTPATIENT
Start: 2025-02-04 | End: 2025-02-06 | Stop reason: HOSPADM

## 2025-02-04 RX ORDER — SODIUM CHLORIDE, SODIUM LACTATE, POTASSIUM CHLORIDE, CALCIUM CHLORIDE 600; 310; 30; 20 MG/100ML; MG/100ML; MG/100ML; MG/100ML
1000 INJECTION, SOLUTION INTRAVENOUS CONTINUOUS
Status: DISPENSED | OUTPATIENT
Start: 2025-02-04 | End: 2025-02-04

## 2025-02-04 RX ORDER — IBUPROFEN 600 MG/1
600 TABLET, FILM COATED ORAL EVERY 6 HOURS PRN
Status: DISCONTINUED | OUTPATIENT
Start: 2025-02-04 | End: 2025-02-04 | Stop reason: HOSPADM

## 2025-02-04 RX ORDER — DEXAMETHASONE SODIUM PHOSPHATE 4 MG/ML
INJECTION, SOLUTION INTRA-ARTICULAR; INTRALESIONAL; INTRAMUSCULAR; INTRAVENOUS; SOFT TISSUE AS NEEDED
Status: DISCONTINUED | OUTPATIENT
Start: 2025-02-04 | End: 2025-02-04 | Stop reason: SURG

## 2025-02-04 RX ORDER — TRAMADOL HYDROCHLORIDE 50 MG/1
50 TABLET ORAL EVERY 6 HOURS PRN
Status: DISCONTINUED | OUTPATIENT
Start: 2025-02-04 | End: 2025-02-06 | Stop reason: HOSPADM

## 2025-02-04 RX ORDER — ATORVASTATIN CALCIUM 10 MG/1
20 TABLET, FILM COATED ORAL NIGHTLY
Status: DISCONTINUED | OUTPATIENT
Start: 2025-02-04 | End: 2025-02-06 | Stop reason: HOSPADM

## 2025-02-04 RX ORDER — ONDANSETRON 4 MG/1
4 TABLET, ORALLY DISINTEGRATING ORAL EVERY 6 HOURS PRN
Status: DISCONTINUED | OUTPATIENT
Start: 2025-02-04 | End: 2025-02-06 | Stop reason: HOSPADM

## 2025-02-04 RX ORDER — NALOXONE HCL 0.4 MG/ML
0.4 VIAL (ML) INJECTION AS NEEDED
Status: DISCONTINUED | OUTPATIENT
Start: 2025-02-04 | End: 2025-02-04 | Stop reason: HOSPADM

## 2025-02-04 RX ORDER — DEXTROSE MONOHYDRATE 25 G/50ML
12.5 INJECTION, SOLUTION INTRAVENOUS AS NEEDED
Status: DISCONTINUED | OUTPATIENT
Start: 2025-02-04 | End: 2025-02-04 | Stop reason: HOSPADM

## 2025-02-04 RX ORDER — IOPAMIDOL 612 MG/ML
INJECTION, SOLUTION INTRAVASCULAR AS NEEDED
Status: DISCONTINUED | OUTPATIENT
Start: 2025-02-04 | End: 2025-02-04 | Stop reason: HOSPADM

## 2025-02-04 RX ORDER — SODIUM CHLORIDE 0.9 % (FLUSH) 0.9 %
10 SYRINGE (ML) INJECTION AS NEEDED
Status: DISCONTINUED | OUTPATIENT
Start: 2025-02-04 | End: 2025-02-04 | Stop reason: HOSPADM

## 2025-02-04 RX ORDER — HYDROCODONE BITARTRATE AND ACETAMINOPHEN 5; 325 MG/1; MG/1
1 TABLET ORAL EVERY 4 HOURS PRN
Status: DISCONTINUED | OUTPATIENT
Start: 2025-02-04 | End: 2025-02-04 | Stop reason: HOSPADM

## 2025-02-04 RX ORDER — SODIUM CHLORIDE 0.9 % (FLUSH) 0.9 %
10 SYRINGE (ML) INJECTION EVERY 12 HOURS SCHEDULED
Status: DISCONTINUED | OUTPATIENT
Start: 2025-02-04 | End: 2025-02-04 | Stop reason: HOSPADM

## 2025-02-04 RX ORDER — FENTANYL CITRATE 50 UG/ML
INJECTION, SOLUTION INTRAMUSCULAR; INTRAVENOUS AS NEEDED
Status: DISCONTINUED | OUTPATIENT
Start: 2025-02-04 | End: 2025-02-04 | Stop reason: SURG

## 2025-02-04 RX ORDER — FENTANYL CITRATE 50 UG/ML
50 INJECTION, SOLUTION INTRAMUSCULAR; INTRAVENOUS
Status: COMPLETED | OUTPATIENT
Start: 2025-02-04 | End: 2025-02-04

## 2025-02-04 RX ORDER — LABETALOL HYDROCHLORIDE 5 MG/ML
5 INJECTION, SOLUTION INTRAVENOUS
Status: DISCONTINUED | OUTPATIENT
Start: 2025-02-04 | End: 2025-02-04 | Stop reason: HOSPADM

## 2025-02-04 RX ORDER — SULFAMETHOXAZOLE AND TRIMETHOPRIM 800; 160 MG/1; MG/1
1 TABLET ORAL EVERY 12 HOURS SCHEDULED
Status: DISCONTINUED | OUTPATIENT
Start: 2025-02-04 | End: 2025-02-06 | Stop reason: HOSPADM

## 2025-02-04 RX ORDER — ROCURONIUM BROMIDE 10 MG/ML
INJECTION, SOLUTION INTRAVENOUS AS NEEDED
Status: DISCONTINUED | OUTPATIENT
Start: 2025-02-04 | End: 2025-02-04 | Stop reason: SURG

## 2025-02-04 RX ORDER — LIDOCAINE HYDROCHLORIDE 10 MG/ML
0.5 INJECTION, SOLUTION EPIDURAL; INFILTRATION; INTRACAUDAL; PERINEURAL ONCE AS NEEDED
Status: DISCONTINUED | OUTPATIENT
Start: 2025-02-04 | End: 2025-02-04 | Stop reason: HOSPADM

## 2025-02-04 RX ORDER — ASPIRIN 81 MG/1
81 TABLET ORAL DAILY
Status: DISCONTINUED | OUTPATIENT
Start: 2025-02-04 | End: 2025-02-06 | Stop reason: HOSPADM

## 2025-02-04 RX ORDER — FLUMAZENIL 0.1 MG/ML
0.2 INJECTION INTRAVENOUS AS NEEDED
Status: DISCONTINUED | OUTPATIENT
Start: 2025-02-04 | End: 2025-02-04 | Stop reason: HOSPADM

## 2025-02-04 RX ORDER — CLONIDINE HYDROCHLORIDE 0.1 MG/1
0.1 TABLET ORAL EVERY 12 HOURS SCHEDULED
Status: DISCONTINUED | OUTPATIENT
Start: 2025-02-04 | End: 2025-02-06 | Stop reason: HOSPADM

## 2025-02-04 RX ORDER — PANTOPRAZOLE SODIUM 40 MG/1
40 TABLET, DELAYED RELEASE ORAL
Status: DISCONTINUED | OUTPATIENT
Start: 2025-02-05 | End: 2025-02-06 | Stop reason: HOSPADM

## 2025-02-04 RX ORDER — ONDANSETRON 2 MG/ML
4 INJECTION INTRAMUSCULAR; INTRAVENOUS ONCE AS NEEDED
Status: DISCONTINUED | OUTPATIENT
Start: 2025-02-04 | End: 2025-02-04 | Stop reason: HOSPADM

## 2025-02-04 RX ORDER — TAMSULOSIN HYDROCHLORIDE 0.4 MG/1
0.4 CAPSULE ORAL NIGHTLY
Status: DISCONTINUED | OUTPATIENT
Start: 2025-02-04 | End: 2025-02-06 | Stop reason: HOSPADM

## 2025-02-04 RX ORDER — ZOLPIDEM TARTRATE 5 MG/1
5 TABLET ORAL NIGHTLY PRN
Status: DISCONTINUED | OUTPATIENT
Start: 2025-02-04 | End: 2025-02-06 | Stop reason: HOSPADM

## 2025-02-04 RX ORDER — PROPOFOL 10 MG/ML
VIAL (ML) INTRAVENOUS AS NEEDED
Status: DISCONTINUED | OUTPATIENT
Start: 2025-02-04 | End: 2025-02-04 | Stop reason: SURG

## 2025-02-04 RX ADMIN — PROPOFOL 100 MG: 10 INJECTION, EMULSION INTRAVENOUS at 13:04

## 2025-02-04 RX ADMIN — PAROXETINE 20 MG: 20 TABLET, FILM COATED ORAL at 19:45

## 2025-02-04 RX ADMIN — Medication 3 MG: at 13:56

## 2025-02-04 RX ADMIN — SODIUM CHLORIDE, POTASSIUM CHLORIDE, SODIUM LACTATE AND CALCIUM CHLORIDE 1000 ML: 600; 310; 30; 20 INJECTION, SOLUTION INTRAVENOUS at 10:18

## 2025-02-04 RX ADMIN — CEFAZOLIN 2000 MG: 2 INJECTION, POWDER, FOR SOLUTION INTRAMUSCULAR; INTRAVENOUS at 13:09

## 2025-02-04 RX ADMIN — FENTANYL CITRATE 50 MCG: 50 INJECTION, SOLUTION INTRAMUSCULAR; INTRAVENOUS at 13:45

## 2025-02-04 RX ADMIN — FENTANYL CITRATE 50 MCG: 50 INJECTION, SOLUTION INTRAMUSCULAR; INTRAVENOUS at 13:04

## 2025-02-04 RX ADMIN — ASPIRIN 81 MG: 81 TABLET, COATED ORAL at 17:55

## 2025-02-04 RX ADMIN — TAMSULOSIN HYDROCHLORIDE 0.4 MG: 0.4 CAPSULE ORAL at 19:44

## 2025-02-04 RX ADMIN — CLONIDINE HYDROCHLORIDE 0.1 MG: 0.1 TABLET ORAL at 17:55

## 2025-02-04 RX ADMIN — Medication 200 MCG: at 13:08

## 2025-02-04 RX ADMIN — EPHEDRINE SULFATE 15 MG: 50 INJECTION INTRAVENOUS at 13:23

## 2025-02-04 RX ADMIN — FENTANYL CITRATE 50 MCG: 50 INJECTION, SOLUTION INTRAMUSCULAR; INTRAVENOUS at 15:45

## 2025-02-04 RX ADMIN — DOCUSATE SODIUM 100 MG: 100 CAPSULE, LIQUID FILLED ORAL at 19:44

## 2025-02-04 RX ADMIN — Medication 100 MCG: at 13:50

## 2025-02-04 RX ADMIN — TRAMADOL HYDROCHLORIDE 50 MG: 50 TABLET, COATED ORAL at 17:57

## 2025-02-04 RX ADMIN — CETIRIZINE HYDROCHLORIDE 10 MG: 10 TABLET, FILM COATED ORAL at 17:56

## 2025-02-04 RX ADMIN — ONDANSETRON 4 MG: 2 INJECTION INTRAMUSCULAR; INTRAVENOUS at 13:54

## 2025-02-04 RX ADMIN — Medication 100 MCG: at 13:16

## 2025-02-04 RX ADMIN — DEXAMETHASONE SODIUM PHOSPHATE 4 MG: 4 INJECTION, SOLUTION INTRA-ARTICULAR; INTRALESIONAL; INTRAMUSCULAR; INTRAVENOUS; SOFT TISSUE at 13:54

## 2025-02-04 RX ADMIN — ONDANSETRON 4 MG: 2 INJECTION INTRAMUSCULAR; INTRAVENOUS at 17:55

## 2025-02-04 RX ADMIN — Medication 200 MCG: at 13:19

## 2025-02-04 RX ADMIN — SULFAMETHOXAZOLE AND TRIMETHOPRIM 1 TABLET: 800; 160 TABLET ORAL at 19:44

## 2025-02-04 RX ADMIN — LIDOCAINE HYDROCHLORIDE 100 MG: 20 INJECTION, SOLUTION EPIDURAL; INFILTRATION; INTRACAUDAL; PERINEURAL at 13:04

## 2025-02-04 RX ADMIN — HYDROCODONE BITARTRATE AND ACETAMINOPHEN 1 TABLET: 10; 325 TABLET ORAL at 15:39

## 2025-02-04 RX ADMIN — ROCURONIUM 35 MG: 50 INJECTION, SOLUTION INTRAVENOUS at 13:04

## 2025-02-04 RX ADMIN — ATORVASTATIN CALCIUM 20 MG: 10 TABLET, FILM COATED ORAL at 19:44

## 2025-02-04 RX ADMIN — DONEPEZIL HYDROCHLORIDE 10 MG: 10 TABLET, FILM COATED ORAL at 19:45

## 2025-02-04 RX ADMIN — FENTANYL CITRATE 50 MCG: 50 INJECTION, SOLUTION INTRAMUSCULAR; INTRAVENOUS at 16:05

## 2025-02-04 RX ADMIN — EPHEDRINE SULFATE 10 MG: 50 INJECTION INTRAVENOUS at 13:50

## 2025-02-04 RX ADMIN — PROPOFOL 50 MG: 10 INJECTION, EMULSION INTRAVENOUS at 13:45

## 2025-02-04 RX ADMIN — GLYCOPYRROLATE 0.4 MG: 0.2 INJECTION INTRAMUSCULAR; INTRAVENOUS at 13:56

## 2025-02-04 RX ADMIN — HYOSCYAMINE SULFATE 125 MCG: 0.12 TABLET SUBLINGUAL at 20:13

## 2025-02-04 NOTE — ANESTHESIA POSTPROCEDURE EVALUATION
"Patient: Migue Rodriguez    Procedure Summary       Date: 02/04/25 Room / Location:  [facility] OR 01 /  [facility]    Anesthesia Start: 1302 Anesthesia Stop: 1406    Procedure: LEFT URETEROSCOPY LASER LITHOTRIPSY, BASKET STONE EXTRACTION, STENT PLACEMENT (Left: Ureter) Diagnosis:       Left ureteral stone      (Left ureteral stone [N20.1])    Surgeons: Derek Hudson MD Provider: Elizabeth Forrest CRNA    Anesthesia Type: general ASA Status: 3            Anesthesia Type: general    Vitals  Vitals Value Taken Time   /79 02/04/25 1417   Temp 97.5 °F (36.4 °C) 02/04/25 1403   Pulse 68 02/04/25 1422   Resp 12 02/04/25 1403   SpO2 100 % 02/04/25 1422   Vitals shown include unfiled device data.        Post Anesthesia Care and Evaluation    Patient location during evaluation: PACU  Patient participation: complete - patient participated  Level of consciousness: awake and awake and alert  Pain score: 0  Pain management: adequate    Airway patency: patent  Anesthetic complications: No anesthetic complications  PONV Status: none  Cardiovascular status: acceptable  Respiratory status: acceptable  Hydration status: acceptable    Comments: Patient discharged according to acceptable Owen score per RN assessment. See nursing records for further information.     Blood pressure 132/67, pulse 70, temperature 97.5 °F (36.4 °C), temperature source Oral, resp. rate 12, height 173.4 cm (68.27\"), weight 68.8 kg (151 lb 10.8 oz), SpO2 100%.      "

## 2025-02-04 NOTE — OP NOTE
CYSTOSCOPY URETEROSCOPY LASER LITHOTRIPSY  Procedure Note    Migue Rodriguez  Date of Procedure: 2/4/2025    Pre-op Diagnosis:   Left ureteral stone [N20.1]    Post-op Diagnosis:     Post-Op Diagnosis Codes:     * Left ureteral stone [N20.1]    Procedure/CPT® Codes:  UT CYSTO W/URETEROSCOPY W/LITHOTRIPSY [96086]    Procedure(s):  LEFT URETEROSCOPY LASER LITHOTRIPSY, BASKET STONE EXTRACTION, STENT PLACEMENT    Surgeon(s):  Derek Hudson MD    Anesthesia: General    Staff:   Circulator: Denise Montiel RN; Gillian Whitmore RN  Scrub Person: Neeraj Moore; Mikaela Quintana    Indications for procedure:  79-year-old male with 1.2 cm left proximal ureteral stone, on anticoagulation for coronary stenting last year presenting for left ureteroscopy laser lithotripsy basket stone extraction    Findings:   Proximal bulbar urethral stricture dilated cystoscope.  Markedly enlarged prostate with trilobar hypertrophy and kissing lateral lobes, friable prostatic urethra due to anticoagulation requiring placement of three-way Greenwood catheter at end of case for CBI to manage generalized prostatic oozing.  Semirigid left ureteroscopy identified 1.2 cm left proximal ureteral stone completely fragmented with holmium laser and removed.    Procedure details:  The patient is identified in the preoperative holding room.  The rationale including the benefits, alternatives and risks of this procedure were already discussed and informed consent has been obtained.  The patient demonstrates good understanding of this procedure.  Also explained was that a stent is not a permanent structure but will need to be changed periodically or removed to prevent encrustation which could lead to kidney damage.    The patient was taken to the operating room where appropriate anesthesia is given.  Surgical prophylaxis with IV antibiotics were administered. Appropriate timeout protocol was observed.  The usual prep and drape with Betadine was  done.    A 23 Algerian cystoscope sheath with a 30° lens was inserted.  The anterior urethra was normal.  Proximal urethra had dense narrowing of the proximal bulbar urethra that was dilated with the cystoscope with minimal resistance.  Cystoscope passed easily into the bladder.  Markedly enlarged prostate with trilobar hypertrophy, kissing lateral lobes, friable prostatic urethra due to his anticoagulation.  Left ureteral orifice was identified.  I placed a 5 Algerian open-ended ureteral catheter into the left ureteral orifice, then placed a 0.035 sensor guidewire through the catheter to negotiate significant J hooking of the distal ureter due to his prostatic hypertrophy.  The ureteral catheter was passed over this wire into the distal ureter and retrograde pyelogram showed tortuous left ureter with multiple falls, possible filling defect proximal aspect ureter consistent with preoperative imaging.  I placed a 0.035 sensor guidewire up into the kidney under fluoroscopy and remove the ureteral catheter.  I then selected the semirigid ureteroscope was able to place this into the left ureter and negotiate the J hooking with a second 0.035 guidewire.  Upon entering the proximal ureter, a very large greater than 1 cm stone was identified.  I used a holmium laser 365 µm fiber in order to dust and fragment the stone into multiple small pieces all of which were then sequentially removed with a tipless nitinol stone basket.  Repeat ureteroscopy demonstrated no other significant stone present and no evidence for ureteral injury.  Retrograde pyelogram performed via the ureteroscope demonstrated tortuous ureter with multiple kinks and folds but no extravasation.  There was significant edema and irritation of the ureter from stone manipulation.  I removed the ureteroscope maintaining the safety wire position.  I then backloaded the safety wire into the cystoscope over which I placed a 6 Algerian by 26 cm Tria soft double-J ureteral  stent in the standard fashion.  The string was left on the stent.  There is significant clot adherent to his very large prostate.  I was able to irrigate the clot out and there was generalized oozing of the prostate due to his anticoagulation without any focal arterial bleeding.  I irrigated the bladder via the cystoscope and there is still some light hematuria present for which I felt it was safest to place a three-way Greenwood catheter for CBI also given his proximal bulbar urethral stricture that was dilated with the scope.  I placed a 0.035 guidewire in the bladder and removed the cystoscope.  I then fashioned a 22 Thai three-way Greenwood catheter into a Creek tip catheter and placed this catheter over the wire into the bladder inflated the balloon with approximate 25 cc of sterile water to allow for tamponade of the prostate ooze.  Continuous bladder irrigation was initiated with light hematuria.  The string was taped to the penis to facilitate removal in the office.  The patient was awakened from anesthesia and taken to recovery in stable condition        Estimated Blood Loss: 30cc    Specimens:                Specimens       ID Source Type Tests Collected By Collected At Frozen?    A Ureter, Left Calculus TISSUE PATHOLOGY EXAM   Derek Hudson MD 2/4/25 1328     Description: LEFT URETERAL STONE              Drains: Left 6 Thai by 26 cm Tria soft double-J ureteral stent with string.  22 Thai three-way Greenwood catheter to gravity with CBI  Ureteral Drain/Stent Left ureter 6 Fr. (Active)       Continuous Bladder Irrigation Triple-lumen 22 Fr (Active)       Complications: none    Derek Hudson MD     Date: 2/4/2025  Time: 14:22 CST

## 2025-02-04 NOTE — ANESTHESIA PREPROCEDURE EVALUATION
Anesthesia Evaluation     Patient summary reviewed   no history of anesthetic complications:   NPO Solid Status: > 8 hours             Airway   Mallampati: II  Dental      Pulmonary    (-) COPD, asthma, sleep apnea, not a smoker  Cardiovascular   Exercise tolerance: good (4-7 METS)    (+) hypertension, cardiac stents (10/2024.  Remains DAPT) within the past 12 months , hyperlipidemia  (-) pacemaker, past MI, angina      Neuro/Psych  (-) seizures, TIA, CVA  GI/Hepatic/Renal/Endo    (-) GERD, liver disease, no renal disease, diabetes    Musculoskeletal     Abdominal    Substance History      OB/GYN          Other                    Anesthesia Plan    ASA 3     general     intravenous induction     Anesthetic plan, risks, benefits, and alternatives have been provided, discussed and informed consent has been obtained with: patient.    CODE STATUS:

## 2025-02-04 NOTE — ANESTHESIA PROCEDURE NOTES
Airway  Urgency: elective    Date/Time: 2/4/2025 1:06 PM  Airway not difficult    General Information and Staff    Patient location during procedure: OR  CRNA/CAA: Elizabeth Forrest CRNA    Indications and Patient Condition  Indications for airway management: airway protection    Preoxygenated: yes  Mask difficulty assessment: 2 - vent by mask + OA or adjuvant +/- NMBA    Final Airway Details  Final airway type: endotracheal airway      Successful airway: ETT  Cuffed: yes   Successful intubation technique: direct laryngoscopy  Facilitating devices/methods: intubating stylet  Endotracheal tube insertion site: oral  Blade: Les  Blade size: 3.5  ETT size (mm): 7.5  Cormack-Lehane Classification: grade IIb - view of arytenoids or posterior of glottis only  Placement verified by: chest auscultation and capnometry   Cuff volume (mL): 8  Measured from: lips  ETT/EBT  to lips (cm): 22  Number of attempts at approach: 1  Assessment: lips, teeth, and gum same as pre-op and atraumatic intubation    Additional Comments  atraumatic

## 2025-02-05 LAB
LAB AP CASE REPORT: NORMAL
Lab: NORMAL
PATH REPORT.FINAL DX SPEC: NORMAL
PATH REPORT.GROSS SPEC: NORMAL
QT INTERVAL: 420 MS
QTC INTERVAL: 433 MS

## 2025-02-05 PROCEDURE — 99238 HOSP IP/OBS DSCHRG MGMT 30/<: CPT | Performed by: UROLOGY

## 2025-02-05 PROCEDURE — 63710000001 TRAMADOL 50 MG TABLET: Performed by: UROLOGY

## 2025-02-05 PROCEDURE — 63710000001 CLOPIDOGREL 75 MG TABLET: Performed by: UROLOGY

## 2025-02-05 PROCEDURE — A9270 NON-COVERED ITEM OR SERVICE: HCPCS | Performed by: UROLOGY

## 2025-02-05 PROCEDURE — 63710000001 DOCUSATE SODIUM 100 MG CAPSULE: Performed by: UROLOGY

## 2025-02-05 PROCEDURE — 63710000001 PROPRANOLOL 20 MG TABLET: Performed by: UROLOGY

## 2025-02-05 PROCEDURE — 63710000001 CLONIDINE 0.1 MG TABLET: Performed by: UROLOGY

## 2025-02-05 PROCEDURE — 63710000001 TAMSULOSIN 0.4 MG CAPSULE: Performed by: UROLOGY

## 2025-02-05 PROCEDURE — 63710000001 SULFAMETHOXAZOLE-TRIMETHOPRIM 800-160 MG TABLET: Performed by: UROLOGY

## 2025-02-05 PROCEDURE — 63710000001 ATORVASTATIN 10 MG TABLET: Performed by: UROLOGY

## 2025-02-05 PROCEDURE — 63710000001 DONEPEZIL 10 MG TABLET: Performed by: UROLOGY

## 2025-02-05 PROCEDURE — 63710000001 PANTOPRAZOLE 40 MG TABLET DELAYED-RELEASE: Performed by: UROLOGY

## 2025-02-05 PROCEDURE — 63710000001 CETIRIZINE 10 MG TABLET: Performed by: UROLOGY

## 2025-02-05 PROCEDURE — 63710000001 PAROXETINE 20 MG TABLET: Performed by: UROLOGY

## 2025-02-05 PROCEDURE — 63710000001 ASPIRIN 81 MG TABLET DELAYED-RELEASE: Performed by: UROLOGY

## 2025-02-05 PROCEDURE — 63710000001 HYOSCYAMINE 0.125 MG SUBLINGUAL TABLET: Performed by: UROLOGY

## 2025-02-05 RX ORDER — SULFAMETHOXAZOLE AND TRIMETHOPRIM 800; 160 MG/1; MG/1
1 TABLET ORAL 2 TIMES DAILY
Qty: 14 TABLET | Refills: 0 | Status: SHIPPED | OUTPATIENT
Start: 2025-02-05 | End: 2025-02-12

## 2025-02-05 RX ORDER — PHENAZOPYRIDINE HYDROCHLORIDE 100 MG/1
100 TABLET, FILM COATED ORAL 3 TIMES DAILY PRN
Qty: 20 TABLET | Refills: 1 | Status: SHIPPED | OUTPATIENT
Start: 2025-02-05

## 2025-02-05 RX ORDER — TRAMADOL HYDROCHLORIDE 50 MG/1
50 TABLET ORAL EVERY 6 HOURS PRN
Qty: 10 TABLET | Refills: 0 | Status: SHIPPED | OUTPATIENT
Start: 2025-02-05

## 2025-02-05 RX ORDER — ONDANSETRON 4 MG/1
4 TABLET, ORALLY DISINTEGRATING ORAL EVERY 6 HOURS PRN
Qty: 6 TABLET | Refills: 1 | Status: SHIPPED | OUTPATIENT
Start: 2025-02-05

## 2025-02-05 RX ORDER — DOCUSATE SODIUM 100 MG/1
100 CAPSULE, LIQUID FILLED ORAL 2 TIMES DAILY
Qty: 60 CAPSULE | Refills: 1 | Status: SHIPPED | OUTPATIENT
Start: 2025-02-05

## 2025-02-05 RX ADMIN — TRAMADOL HYDROCHLORIDE 50 MG: 50 TABLET, COATED ORAL at 06:06

## 2025-02-05 RX ADMIN — ASPIRIN 81 MG: 81 TABLET, COATED ORAL at 08:43

## 2025-02-05 RX ADMIN — SULFAMETHOXAZOLE AND TRIMETHOPRIM 1 TABLET: 800; 160 TABLET ORAL at 20:15

## 2025-02-05 RX ADMIN — PANTOPRAZOLE SODIUM 40 MG: 40 TABLET, DELAYED RELEASE ORAL at 06:22

## 2025-02-05 RX ADMIN — ATORVASTATIN CALCIUM 20 MG: 10 TABLET, FILM COATED ORAL at 20:15

## 2025-02-05 RX ADMIN — CLONIDINE HYDROCHLORIDE 0.1 MG: 0.1 TABLET ORAL at 08:43

## 2025-02-05 RX ADMIN — DOCUSATE SODIUM 100 MG: 100 CAPSULE, LIQUID FILLED ORAL at 08:43

## 2025-02-05 RX ADMIN — HYOSCYAMINE SULFATE 125 MCG: 0.12 TABLET SUBLINGUAL at 20:15

## 2025-02-05 RX ADMIN — PROPRANOLOL HYDROCHLORIDE 20 MG: 20 TABLET ORAL at 06:22

## 2025-02-05 RX ADMIN — SULFAMETHOXAZOLE AND TRIMETHOPRIM 1 TABLET: 800; 160 TABLET ORAL at 08:43

## 2025-02-05 RX ADMIN — CETIRIZINE HYDROCHLORIDE 10 MG: 10 TABLET, FILM COATED ORAL at 08:43

## 2025-02-05 RX ADMIN — PAROXETINE 20 MG: 20 TABLET, FILM COATED ORAL at 08:43

## 2025-02-05 RX ADMIN — TAMSULOSIN HYDROCHLORIDE 0.4 MG: 0.4 CAPSULE ORAL at 20:15

## 2025-02-05 RX ADMIN — CLOPIDOGREL BISULFATE 75 MG: 75 TABLET ORAL at 08:43

## 2025-02-05 RX ADMIN — DONEPEZIL HYDROCHLORIDE 10 MG: 10 TABLET, FILM COATED ORAL at 20:15

## 2025-02-05 RX ADMIN — CLONIDINE HYDROCHLORIDE 0.1 MG: 0.1 TABLET ORAL at 20:15

## 2025-02-05 RX ADMIN — DOCUSATE SODIUM 100 MG: 100 CAPSULE, LIQUID FILLED ORAL at 20:15

## 2025-02-05 RX ADMIN — TRAMADOL HYDROCHLORIDE 50 MG: 50 TABLET, COATED ORAL at 00:03

## 2025-02-05 NOTE — PROGRESS NOTES
Urology  Length of Stay: 0  Patient Care Team:  Yuliana Bajwa APRN as PCP - General (Family Medicine)  Kailash Mendoza MD as Consulting Physician (Urology)    Chief Complaint: Gross hematuria    Subjective     Interval History:   Postop day 1 status post uncomplicated left ureteroscopy laser lithotripsy basket stone extraction after which the patient experienced medical prostatic bleeding due to his required anticoagulation with Plavix and aspirin due to drug-eluting stent last year for which he was kept overnight for CBI.  He denies pain or complaints.  This a.m., urine remains pink on slow CBI drip.  I tried clamping the CBI this morning but had to be turned back on due to cherry colored urine.  Patient reports he required hand irrigation once overnight.    Review of Systems:   Review of Systems    Objective       Intake/Output Summary (Last 24 hours) at 2/5/2025 1234  Last data filed at 2/5/2025 1100  Gross per 24 hour   Intake 850 ml   Output 5250 ml   Net -4400 ml       Vital Signs  Temp:  [97.3 °F (36.3 °C)-98.1 °F (36.7 °C)] 98.1 °F (36.7 °C)  Heart Rate:  [58-89] 58  Resp:  [12-18] 18  BP: (115-177)/(66-98) 115/66    Physical Exam:  Physical Exam  Greenwood catheter in place with light pink urine on slow CBI drip.     Results Review:       I reviewed the patient's new clinical results.  Lab Results (last 24 hours)       Procedure Component Value Units Date/Time    Tissue Pathology Exam [[id number]] Collected: 02/04/25 1329    Specimen: Calculus from Ureter, Left Updated: 02/05/25 0749     Note to Patients --     This report may contain a detailed description of human tissue sent by a health care provider to the laboratory for pathologic evaluation. The content of this report is essential for diagnosis and may provide important critical findings. This information may be unfamiliar to patients to review without a medical professional present. It is advised that the patient review this report in the presence of  "a health care provider who can answer questions and explain the results.       Case Report --     Surgical Pathology Report                         Case: [id number]                                  Authorizing Provider:  Derek Hudson MD      Collected:           02/04/2025 01:29 PM          Ordering Location:     [facility] OR  Received:            02/04/2025 03:24 PM          Pathologist:           Hayes Hall MD                                                      Specimen:    Ureter, Left, LEFT URETERAL STONE                                                           Final Diagnosis --     Left ureteral stone (gross only):  Renal lithiasis.  The specimen will be sent for x-ray crystal analysis and an outside addendum report will follow.       Gross Description --     1. Ureter, Left.  Received fresh in a container without fixative labeled with the patient's name, medical record number and \"left ureteral stone\" is a single fragment of red jagged calculus measuring 0.3 cm in greatest dimension.  No tissue is submitted for histological evaluation.              Imaging Results (Last 24 Hours)       Procedure Component Value Units Date/Time    FL Retrograde Pyelogram In OR [[id number]] Collected: 02/04/25 1351     Updated: 02/04/25 1355    Narrative:      FL RETROGRADE PYELOGRAM IN OR- 2/4/2025 12:49 PM     HISTORY: stent; N20.1-Calculus of ureter     COMPARISON: None     FLUOROSCOPY TIME: 0.9 minutes     FLUOROSCOPY DOSE: 5.9 mGy     NUMBER OF IMAGES: 16       Impression:         Intraoperative fluoroscopic images during cystoscopy with left  ureteroscopy and stent placement.     Please refer to the operative note for more details.     This report was signed and finalized on 2/4/2025 1:52 PM by Dr. Julio Rosen MD.               Medication Review:     Current Facility-Administered Medications:     aspirin EC tablet 81 mg, 81 mg, Oral, Daily, Derek Hudson MD, 81 mg at 02/05/25 " 0843    atorvastatin (LIPITOR) tablet 20 mg, 20 mg, Oral, Nightly, Derek Hudson MD, 20 mg at 02/1944    cetirizine (zyrTEC) tablet 10 mg, 10 mg, Oral, Daily, Derek Hudson MD, 10 mg at 02/05/25 0843    cloNIDine (CATAPRES) tablet 0.1 mg, 0.1 mg, Oral, Q12H, Derek Hudson MD, 0.1 mg at 02/05/25 0843    clopidogrel (PLAVIX) tablet 75 mg, 75 mg, Oral, Daily, Derek Hudson MD, 75 mg at 02/05/25 0843    docusate sodium (COLACE) capsule 100 mg, 100 mg, Oral, BID, Derek Hudson MD, 100 mg at 02/05/25 0843    donepezil (ARICEPT) tablet 10 mg, 10 mg, Oral, Nightly, Derek Hudson MD, 10 mg at 02/04/25 1945    hyoscyamine (LEVSIN) SL tablet 125 mcg, 125 mcg, Sublingual, Q6H PRN, Derek Hudson MD, 125 mcg at 02/04/25 2013    ondansetron ODT (ZOFRAN-ODT) disintegrating tablet 4 mg, 4 mg, Oral, Q6H PRN **OR** ondansetron (ZOFRAN) injection 4 mg, 4 mg, Intravenous, Q6H PRN, Derek Hudson MD, 4 mg at 02/04/25 1755    pantoprazole (PROTONIX) EC tablet 40 mg, 40 mg, Oral, Q AM, Derek Hudson MD, 40 mg at 02/05/25 0622    PARoxetine (PAXIL) tablet 20 mg, 20 mg, Oral, Daily, Derek Hudson MD, 20 mg at 02/05/25 0843    propranolol (INDERAL) tablet 20 mg, 20 mg, Oral, QAM, Derek Hudson MD, 20 mg at 02/05/25 0622    sulfamethoxazole-trimethoprim (BACTRIM DS,SEPTRA DS) 800-160 MG per tablet 1 tablet, 1 tablet, Oral, Q12H, Derek Hudson MD, 1 tablet at 02/05/25 0843    tamsulosin (FLOMAX) 24 hr capsule 0.4 mg, 0.4 mg, Oral, Nightly, Derek Hudson MD, 0.4 mg at 02/1944    traMADol (ULTRAM) tablet 50 mg, 50 mg, Oral, Q6H PRN, Derek Hudson MD, 50 mg at 02/05/25 0606    zolpidem (AMBIEN) tablet 5 mg, 5 mg, Oral, Nightly PRN, Derek Hudson MD    Assessment/Plan:   POD 1 s/p left ureteroscopy laser lithotripsy basket stone extraction and stent placement with postoperative prostatic bleeding due to his very large prostate and medical anticoagulation.  Given  his continued CBI requirement, I recommended continue CBI overnight tonight and will reassess for possible discharge in a.m with Greenwood catheter in place along with his string stent to return to the office as an outpatient on Monday for stent and Greenwood catheter removal.          (Please note that portions of this note were completed with a voice recognition program.)  Derek Hudson MD  02/05/25  12:34 CST

## 2025-02-05 NOTE — DISCHARGE SUMMARY
Date of Discharge:  2/6/25    Discharge Diagnosis:   #1. Left ureteral stone  #2. Enlarged prostate with gross hematuria    Presenting Problem/History of Present Illness  Left ureteral stone [N20.1]  BPH with urinary obstruction [N40.1, N13.8]       Hospital Course  Patient is a 79 y.o. male presented for left ureteroscopy laser lithotripsy basket stone extraction on 2/4/2025.  He was noted intraoperatively to have very large prostate with friable bleeding prostate urethra due to his medical anticoagulation requiring postoperative placement of three-way Greenwood catheter and admission overnight for continuous bladder irrigation.  He tolerated surgery well and a string was left on the stent with Steri-Strips along with his Greenwood catheter.  On postoperative day 1, he tolerated regular diet and pain was controlled.  He did have some continued mild hematuria when clamping the CBI for which CBI was restarted and the decision was made to keep in the hospital an additional night for bladder irrigation due to his required anticoagulation with Plavix and aspirin due to his drug-eluting stent just last year.    Procedures Performed  Procedure(s):  LEFT URETEROSCOPY LASER LITHOTRIPSY, BASKET STONE EXTRACTION, STENT PLACEMENT       Consults:   Consults       No orders found for last 30 day(s).            Condition on Discharge: Stable    Vital Signs  Temp:  [97.3 °F (36.3 °C)-98.1 °F (36.7 °C)] 98.1 °F (36.7 °C)  Heart Rate:  [58-89] 58  Resp:  [12-18] 18  BP: (115-177)/(66-98) 115/66      Discharge Disposition  Home or Self Care    Discharge Medications     Discharge Medications        New Medications        Instructions Start Date   docusate sodium 100 MG capsule  Commonly known as: Colace   100 mg, Oral, 2 Times Daily      ondansetron ODT 4 MG disintegrating tablet  Commonly known as: ZOFRAN-ODT   4 mg, Translingual, Every 6 Hours PRN      phenazopyridine 100 MG tablet  Commonly known as: PYRIDIUM   100 mg, Oral, 3 Times  Daily PRN      sulfamethoxazole-trimethoprim 800-160 MG per tablet  Commonly known as: BACTRIM DS,SEPTRA DS   1 tablet, Oral, 2 Times Daily      traMADol 50 MG tablet  Commonly known as: ULTRAM   50 mg, Oral, Every 6 Hours PRN             Continue These Medications        Instructions Start Date   aspirin 81 MG EC tablet   81 mg, Oral, Daily      atorvastatin 20 MG tablet  Commonly known as: LIPITOR   1 tablet, Oral, Nightly      clopidogrel 75 MG tablet  Commonly known as: PLAVIX   1 tablet, Oral, Daily      Coenzyme Q10 200 MG capsule   200 mg, Oral, Daily      donepezil 10 MG tablet  Commonly known as: ARICEPT   1 tablet, Oral, Nightly      finasteride 5 MG tablet  Commonly known as: PROSCAR   5 mg, Oral, Daily      fish oil 1000 MG capsule capsule   1,000 mg, Oral, Daily With Breakfast      hydrOXYzine 25 MG tablet  Commonly known as: ATARAX   1 tablet, Oral, 3 Times Daily      levocetirizine 5 MG tablet  Commonly known as: XYZAL   5 mg, Oral, Every Night at Bedtime      multivitamin tablet tablet   1 tablet, Oral, Daily      omeprazole 20 MG capsule  Commonly known as: priLOSEC   20 mg, Oral, Daily      PARoxetine 20 MG tablet  Commonly known as: PAXIL   1 tablet, Oral, Daily      propranolol 20 MG tablet  Commonly known as: INDERAL   20 mg, Oral, 2 Times Daily      Slow Release Iron 45 MG tablet controlled-release tablet  Generic drug: ferrous sulfate dried ER   1 tablet, Oral, Daily      SUPER B COMPLEX PO   1 tablet, Oral, Daily      tamsulosin 0.4 MG capsule 24 hr capsule  Commonly known as: FLOMAX   0.4 mg, Oral, Nightly, nightly             Stop These Medications      cloNIDine 0.1 MG tablet  Commonly known as: CATAPRES              Discharge Diet:   Diet Instructions       Advance Diet As Tolerated -Target Diet: regular      Target Diet: regular            Activity at Discharge:   Activity Instructions       Discharge Activity      1) No driving for 2 days and no longer taking narcotics.   2) May shower  tonight  3) Do not lift / push / pull more than 20 pounds for 2 weeks.            Follow-up Appointments  Future Appointments   Date Time Provider Department Center   2/10/2025  9:00 AM [facility] NURSE [facility]   4/14/2025  1:15 PM  [facility]  [facility]   4/21/2025  1:15 PM Mariano Yu MD [facility]         Test Results Pending at Discharge       Derek Hudson MD  02/05/25  12:46 CST    Time: Discharge 30 min

## 2025-02-05 NOTE — PLAN OF CARE
Goal Outcome Evaluation: Pt is alert and oriented, vitals are stable, room air, IV is clean dry intact, CBI cont to irrigate, no s/s of distress, no c/o pain, plan is to go home tomorrow

## 2025-02-05 NOTE — PLAN OF CARE
Goal Outcome Evaluation:           Progress: improving     Received from PACU.  Greenwood cath with CBI.  Currently at moderate to fast drip with clear pink urine - small clots noted in tubing.  Medicated for pain and nausea.  Tolerating regular diet.  RA.  IVF as ordered.  Safety maintained.

## 2025-02-05 NOTE — PLAN OF CARE
Goal Outcome Evaluation:  Plan of Care Reviewed With: patient        Progress: improving  Outcome Evaluation: IID; Greenwood with CBI; early in the shift patient complained of pain hand irrigated catheter with 1L of sterile water, multiple small clots removed, irrigated until urine returned was clear without clots; medicated for pain x1; medicated for bladder spasms x1; resting between care; safety maintained

## 2025-02-06 ENCOUNTER — TELEPHONE (OUTPATIENT)
Age: 80
End: 2025-02-06

## 2025-02-06 VITALS
DIASTOLIC BLOOD PRESSURE: 64 MMHG | BODY MASS INDEX: 22.99 KG/M2 | RESPIRATION RATE: 16 BRPM | HEART RATE: 52 BPM | TEMPERATURE: 97.7 F | SYSTOLIC BLOOD PRESSURE: 122 MMHG | OXYGEN SATURATION: 98 % | WEIGHT: 151.68 LBS | HEIGHT: 68 IN

## 2025-02-06 PROCEDURE — 63710000001 ASPIRIN 81 MG TABLET DELAYED-RELEASE: Performed by: UROLOGY

## 2025-02-06 PROCEDURE — A9270 NON-COVERED ITEM OR SERVICE: HCPCS | Performed by: UROLOGY

## 2025-02-06 PROCEDURE — 99232 SBSQ HOSP IP/OBS MODERATE 35: CPT | Performed by: UROLOGY

## 2025-02-06 PROCEDURE — 63710000001 SULFAMETHOXAZOLE-TRIMETHOPRIM 800-160 MG TABLET: Performed by: UROLOGY

## 2025-02-06 PROCEDURE — 63710000001 PANTOPRAZOLE 40 MG TABLET DELAYED-RELEASE: Performed by: UROLOGY

## 2025-02-06 PROCEDURE — 63710000001 CLOPIDOGREL 75 MG TABLET: Performed by: UROLOGY

## 2025-02-06 PROCEDURE — 63710000001 PAROXETINE 20 MG TABLET: Performed by: UROLOGY

## 2025-02-06 PROCEDURE — 63710000001 CETIRIZINE 10 MG TABLET: Performed by: UROLOGY

## 2025-02-06 PROCEDURE — 63710000001 DOCUSATE SODIUM 100 MG CAPSULE: Performed by: UROLOGY

## 2025-02-06 PROCEDURE — 63710000001 ONDANSETRON ODT 4 MG TABLET DISPERSIBLE: Performed by: UROLOGY

## 2025-02-06 PROCEDURE — 63710000001 TRAMADOL 50 MG TABLET: Performed by: UROLOGY

## 2025-02-06 PROCEDURE — 63710000001 PROPRANOLOL 20 MG TABLET: Performed by: UROLOGY

## 2025-02-06 RX ADMIN — ASPIRIN 81 MG: 81 TABLET, COATED ORAL at 08:35

## 2025-02-06 RX ADMIN — TRAMADOL HYDROCHLORIDE 50 MG: 50 TABLET, COATED ORAL at 06:12

## 2025-02-06 RX ADMIN — ONDANSETRON 4 MG: 4 TABLET, ORALLY DISINTEGRATING ORAL at 16:59

## 2025-02-06 RX ADMIN — PROPRANOLOL HYDROCHLORIDE 20 MG: 20 TABLET ORAL at 06:12

## 2025-02-06 RX ADMIN — CETIRIZINE HYDROCHLORIDE 10 MG: 10 TABLET, FILM COATED ORAL at 08:36

## 2025-02-06 RX ADMIN — PAROXETINE 20 MG: 20 TABLET, FILM COATED ORAL at 08:35

## 2025-02-06 RX ADMIN — SULFAMETHOXAZOLE AND TRIMETHOPRIM 1 TABLET: 800; 160 TABLET ORAL at 08:36

## 2025-02-06 RX ADMIN — DOCUSATE SODIUM 100 MG: 100 CAPSULE, LIQUID FILLED ORAL at 08:35

## 2025-02-06 RX ADMIN — PANTOPRAZOLE SODIUM 40 MG: 40 TABLET, DELAYED RELEASE ORAL at 06:13

## 2025-02-06 RX ADMIN — CLOPIDOGREL BISULFATE 75 MG: 75 TABLET ORAL at 08:36

## 2025-02-06 NOTE — PLAN OF CARE
Goal Outcome Evaluation:                 Patient alert and oriented x4. Discharging today home with catheter. Catheter education provided to patient as well as teaching on transitioning milligan bag to leg bag. MD aware. No needs at this time.

## 2025-02-06 NOTE — PLAN OF CARE
Problem: Adult Inpatient Plan of Care  Goal: Plan of Care Review  Outcome: Progressing  Goal: Patient-Specific Goal (Individualized)  Outcome: Progressing  Goal: Absence of Hospital-Acquired Illness or Injury  Outcome: Progressing  Intervention: Identify and Manage Fall Risk  Recent Flowsheet Documentation  Taken 2/6/2025 0100 by Nubia Fleming RN  Safety Promotion/Fall Prevention: safety round/check completed  Taken 2/5/2025 2300 by Nubia Fleming RN  Safety Promotion/Fall Prevention: safety round/check completed  Taken 2/5/2025 2015 by Nubia Fleming RN  Safety Promotion/Fall Prevention: safety round/check completed  Taken 2/5/2025 2000 by Nubia Fleming RN  Safety Promotion/Fall Prevention: safety round/check completed  Intervention: Prevent Skin Injury  Recent Flowsheet Documentation  Taken 2/5/2025 2015 by Nubia Fleming RN  Body Position: supine  Intervention: Prevent and Manage VTE (Venous Thromboembolism) Risk  Recent Flowsheet Documentation  Taken 2/5/2025 2015 by Nubia Fleming RN  VTE Prevention/Management:   bilateral   SCDs (sequential compression devices) on  Goal: Optimal Comfort and Wellbeing  Outcome: Progressing  Intervention: Monitor Pain and Promote Comfort  Recent Flowsheet Documentation  Taken 2/5/2025 2015 by Nubia Fleming RN  Pain Management Interventions: pain medication given  Goal: Readiness for Transition of Care  Outcome: Progressing     Problem: Surgery Nonspecified  Goal: Absence of Bleeding  Outcome: Progressing  Goal: Effective Bowel Elimination  Outcome: Progressing  Goal: Fluid and Electrolyte Balance  Outcome: Progressing  Goal: Absence of Infection Signs and Symptoms  Outcome: Progressing  Goal: Optimal Pain Control and Function  Outcome: Progressing  Intervention: Prevent or Manage Pain  Recent Flowsheet Documentation  Taken 2/5/2025 2015 by Nubia Fleming RN  Pain Management Interventions: pain medication given  Goal: Nausea and Vomiting  Relief  Outcome: Progressing  Goal: Effective Urinary Elimination  Outcome: Progressing     Problem: Fall Injury Risk  Goal: Absence of Fall and Fall-Related Injury  Outcome: Progressing  Intervention: Promote Injury-Free Environment  Recent Flowsheet Documentation  Taken 2/6/2025 0100 by Nubia Fleming RN  Safety Promotion/Fall Prevention: safety round/check completed  Taken 2/5/2025 2300 by Nubia Fleming RN  Safety Promotion/Fall Prevention: safety round/check completed  Taken 2/5/2025 2015 by Nubia Fleming RN  Safety Promotion/Fall Prevention: safety round/check completed  Taken 2/5/2025 2000 by Nubia Fleming RN  Safety Promotion/Fall Prevention: safety round/check completed     Problem: Comorbidity Management  Goal: Blood Pressure in Desired Range  Outcome: Progressing   Goal Outcome Evaluation:      Continues with CBI, clear pink colored urine output. Ambulated halls this shift. C/o bladder spasms and mediation administered as ordered. VSS. Safety maintained. Rest promoted.

## 2025-02-06 NOTE — PROGRESS NOTES
Urology  Length of Stay: 0  Patient Care Team:  Yuliana Bajwa APRN as PCP - General (Family Medicine)  Kailash Mendoza MD as Consulting Physician (Urology)    Chief Complaint: Gross hematuria status post ureteroscopy    Subjective     Interval History:   No complaints of pain.  Tolerating Greenwood adequately.  Denies hand irrigation of catheter since first night.  Tolerating diet.  Urine is burgundy in the Greenwood catheter with CBI turned off.  Clears rapidly with CBI.    Review of Systems:   Review of Systems   All other systems reviewed and are negative.      Objective       Intake/Output Summary (Last 24 hours) at 2/6/2025 0937  Last data filed at 2/6/2025 0621  Gross per 24 hour   Intake 480 ml   Output 4550 ml   Net -4070 ml       Vital Signs  Temp:  [97.5 °F (36.4 °C)-98.1 °F (36.7 °C)] 97.7 °F (36.5 °C)  Heart Rate:  [52-70] 52  Resp:  [16-18] 16  BP: (107-142)/(55-70) 122/64    Physical Exam:  Physical Exam  Constitutional:       General: He is not in acute distress.     Appearance: Normal appearance. He is not toxic-appearing.   HENT:      Head: Normocephalic and atraumatic.   Pulmonary:      Effort: Pulmonary effort is normal.   Abdominal:      General: Abdomen is flat.      Palpations: Abdomen is soft.   Genitourinary:     Comments: Urine burgundy with CBI turned off.  Clears rapidly with CBI  Neurological:      Mental Status: He is alert.          Results Review:       I reviewed the patient's new clinical results.  Lab Results (last 24 hours)       ** No results found for the last 24 hours. **          Imaging Results (Last 24 Hours)       ** No results found for the last 24 hours. **            Medication Review:     Current Facility-Administered Medications:     aspirin EC tablet 81 mg, 81 mg, Oral, Daily, Derek Hudson MD, 81 mg at 02/06/25 0835    atorvastatin (LIPITOR) tablet 20 mg, 20 mg, Oral, Nightly, Derek Hudson MD, 20 mg at 02/05/25 2015    cetirizine (zyrTEC) tablet 10 mg, 10 mg,  Oral, Daily, Derek Hudson MD, 10 mg at 02/06/25 0836    cloNIDine (CATAPRES) tablet 0.1 mg, 0.1 mg, Oral, Q12H, Derek Hudson MD, 0.1 mg at 02/05/25 2015    clopidogrel (PLAVIX) tablet 75 mg, 75 mg, Oral, Daily, Derek Hudson MD, 75 mg at 02/06/25 0836    docusate sodium (COLACE) capsule 100 mg, 100 mg, Oral, BID, Derek Hudson MD, 100 mg at 02/06/25 0835    donepezil (ARICEPT) tablet 10 mg, 10 mg, Oral, Nightly, Derek Hudson MD, 10 mg at 02/05/25 2015    hyoscyamine (LEVSIN) SL tablet 125 mcg, 125 mcg, Sublingual, Q6H PRN, Derek Hudson MD, 125 mcg at 02/05/25 2015    ondansetron ODT (ZOFRAN-ODT) disintegrating tablet 4 mg, 4 mg, Oral, Q6H PRN **OR** ondansetron (ZOFRAN) injection 4 mg, 4 mg, Intravenous, Q6H PRN, Derek Hudson MD, 4 mg at 02/04/25 1755    pantoprazole (PROTONIX) EC tablet 40 mg, 40 mg, Oral, Q AM, Derek Hudson MD, 40 mg at 02/06/25 0613    PARoxetine (PAXIL) tablet 20 mg, 20 mg, Oral, Daily, Derek Hudson MD, 20 mg at 02/06/25 0835    propranolol (INDERAL) tablet 20 mg, 20 mg, Oral, QAM, Derek Hudson MD, 20 mg at 02/06/25 0612    sulfamethoxazole-trimethoprim (BACTRIM DS,SEPTRA DS) 800-160 MG per tablet 1 tablet, 1 tablet, Oral, Q12H, Derek Hudson MD, 1 tablet at 02/06/25 0836    tamsulosin (FLOMAX) 24 hr capsule 0.4 mg, 0.4 mg, Oral, Nightly, Derek Hudson MD, 0.4 mg at 02/05/25 2015    traMADol (ULTRAM) tablet 50 mg, 50 mg, Oral, Q6H PRN, Derek Hudson MD, 50 mg at 02/06/25 0612    zolpidem (AMBIEN) tablet 5 mg, 5 mg, Oral, Nightly PRN, Derek Hudson MD    Problem List:    Left ureteral stone    BPH with urinary obstruction     Assessment/Plan:   Postoperative day #2 status post left ureteroscopy with laser lithotripsy and stone extraction with stent placement, with resultant prostatic bleeding on anticoagulation.    Will ask nursing to hand irrigate the catheter and then we will see what his urine looks like later in the  day.  If it is adequately clear he can go home today and if not he will be planned for prostate fulguration with Dr. Hudson tomorrow.    Addendum: Returned at 1540 to evaluate patient. Urine now light alfredo with some sediment in tubing. Cleared for discharge. Greenwood and stent to be removed in office on Monday. Rx sent.    (Please note that portions of this note were completed with a voice recognition program.)  Ulices Godinez MD  02/06/25  09:37 CST

## 2025-02-07 NOTE — TELEPHONE ENCOUNTER
Care Transitions Initial Follow Up Call    Outreach made within 2 business days of discharge: Yes    Patient: Fernando Chu Patient : 1945   MRN: [id number]  Reason for Admission: left ureteral stone, urinary obstruction. Had stone removed, stent placed  Discharge Date: 2024       Spoke with: Attempted to call, Voicemail full    Discharge department/facility: [facility]    Follow Up  Future Appointments   Date Time Provider Department Center   2025  1:15 PM Misty Blas APRN LPS [facility] DEP   2025  1:30 PM Mary Borrero APRN N [facility]   2025  1:30 PM SCHEDULE, L MED ONC MA [facility] MED ONC [facility]   4/15/2025  9:00 AM Misty Blas APRN LPS [facility] DEP       Phillip Tilley MA

## 2025-02-10 ENCOUNTER — TELEPHONE (OUTPATIENT)
Dept: UROLOGY | Facility: CLINIC | Age: 80
End: 2025-02-10
Payer: MEDICARE

## 2025-02-10 NOTE — TELEPHONE ENCOUNTER
[facility] called to cancel pt nurse visit today due to pt being admitted for SBO and Covid. They had further questions in regards to stent/catheter removal. Nurse spoke with Pedro, Pedro advised if pt is still having gross hematuria, pt will need to leave catheter and stent for a few more days. Nurse v/u and will call back to make appt.

## 2025-02-12 NOTE — TELEPHONE ENCOUNTER
Just THOMAS I just called [facility] and this patient is still admitted there. I see he is on the schedule tomorrow! So wanted to give you a heads up in case you want to check again in the morning.

## 2025-02-24 LAB
LAB AP CASE REPORT: NORMAL
Lab: NORMAL
PATH REPORT.FINAL DX SPEC: NORMAL
PATH REPORT.GROSS SPEC: NORMAL

## 2025-02-25 ENCOUNTER — TELEPHONE (OUTPATIENT)
Dept: MRI IMAGING | Facility: HOSPITAL | Age: 80
End: 2025-02-25

## 2025-02-25 ENCOUNTER — TELEPHONE (OUTPATIENT)
Dept: UROLOGY | Facility: CLINIC | Age: 80
End: 2025-02-25
Payer: MEDICARE

## 2025-02-25 DIAGNOSIS — N20.1 LEFT URETERAL STONE: Primary | ICD-10-CM

## 2025-02-25 NOTE — TELEPHONE ENCOUNTER
----- Message from Lorena LOCKETT sent at 2/25/2025  1:50 PM CST -----  Regarding: FW: Please call and schedule  Will you put in a renal u/s order please?  Thanks  ----- Message -----  From: Derek Hudson MD  Sent: 2/24/2025   4:13 PM CST  To: [facility] [place] [facility]  Subject: Please call and schedule                         Patient needs f/u with me in [place] in 6 weeks with preclinic renal ultrasound    Thank you!

## 2025-02-25 NOTE — TELEPHONE ENCOUNTER
Spoke to daughter in regards to IF in recent CT imaging. She stated that chay has been in the hospital at  for two weeks and has been through 2 surgeries. She stated she will discuss with Doctor at  in regards to this to see if they needed to f/u still. I gave her my number to call if they have any questions and told her I can route the images/results to them if needed.

## 2025-02-25 NOTE — PROGRESS NOTES
Subjective    Mr. Rodriguez is 79 y.o. male    Chief Complaint: Ureteral Stone     History of Present Illness  Patient is an 79-year-old gentleman here for postop follow-up patient had ureteroscopy laser lithotripsy stent placement 02/04/2025 by Dr. Hudson for a 1.2 cm left proximal ureteral stone.  He had developed hematuria regarding postop placement of three-way Greenwood catheter and continuous bladder irrigation.  He is not able to stop aspirin or Plavix due to his drug-eluting stent placed just last year.  He was to return to have catheter and stent removed in the office but this was canceled patient was admitted to [facility] because of abdominal pain nausea and ended up with a small bowel obstruction.  He failed 2 different voiding trials had to have catheter replaced on 2 different occasions so the catheter he has in place now is the one he has had since [facility].  There is a note that says that the stent was removed at [facility] along with the first catheter removal.  Urine in the catheter bag is clear.      The following portions of the patient's history were reviewed and updated as appropriate: allergies, current medications, past family history, past medical history, past social history, past surgical history and problem list.    Review of Systems   Genitourinary:  Positive for hematuria. Negative for difficulty urinating.         Current Outpatient Medications:     aspirin 81 MG EC tablet, Take 1 tablet by mouth Daily., Disp: , Rfl:     atorvastatin (LIPITOR) 20 MG tablet, Take 1 tablet by mouth Every Night., Disp: , Rfl:     B Complex-C (SUPER B COMPLEX PO), Take 1 tablet by mouth Daily., Disp: , Rfl:     clopidogrel (PLAVIX) 75 MG tablet, Take 1 tablet by mouth Daily., Disp: , Rfl:     Coenzyme Q10 200 MG capsule, Take 200 mg by mouth Daily., Disp: , Rfl:     docusate sodium (Colace) 100 MG capsule, Take 1 capsule by mouth 2 (Two) Times a Day., Disp: 60 capsule, Rfl: 1     donepezil (ARICEPT) 10 MG tablet, Take 1 tablet by mouth Every Night., Disp: , Rfl:     ferrous sulfate dried ER (Slow Release Iron) 45 MG tablet controlled-release tablet, Take 140 mg by mouth Daily., Disp: , Rfl:     finasteride (PROSCAR) 5 MG tablet, Take 1 tablet by mouth Daily., Disp: 90 tablet, Rfl: 4    hydrOXYzine (ATARAX) 25 MG tablet, Take 1 tablet by mouth 3 (Three) Times a Day., Disp: , Rfl:     levocetirizine (XYZAL) 5 MG tablet, Take 1 tablet by mouth every night at bedtime., Disp: , Rfl:     multivitamin (THERAGRAN) tablet tablet, Take 1 tablet by mouth Daily., Disp: , Rfl:     Omega-3 Fatty Acids (FISH OIL) 1000 MG capsule capsule, Take 1 capsule by mouth Daily With Breakfast., Disp: , Rfl:     omeprazole (priLOSEC) 20 MG capsule, Take 1 capsule by mouth Daily., Disp: , Rfl:     ondansetron ODT (ZOFRAN-ODT) 4 MG disintegrating tablet, Place 1 tablet on the tongue Every 6 (Six) Hours As Needed for Nausea., Disp: 6 tablet, Rfl: 1    oxyCODONE-acetaminophen (PERCOCET) 5-325 MG per tablet, Take 1 tablet by mouth 6 (Six) Times a Day., Disp: , Rfl:     PARoxetine (PAXIL) 20 MG tablet, Take 1 tablet by mouth Daily., Disp: , Rfl:     propranolol (INDERAL) 20 MG tablet, Take 1 tablet by mouth 2 (Two) Times a Day., Disp: , Rfl:     tamsulosin (FLOMAX) 0.4 MG capsule 24 hr capsule, Take 1 capsule by mouth Every Night. nightly, Disp: 90 capsule, Rfl: 4    phenazopyridine (PYRIDIUM) 100 MG tablet, Take 1 tablet by mouth 3 (Three) Times a Day As Needed (urinary burning). (Patient not taking: Reported on 2/27/2025), Disp: 20 tablet, Rfl: 1    tamsulosin (FLOMAX) 0.4 MG capsule 24 hr capsule, Take 2 capsules by mouth Every Night for 30 days., Disp: 60 capsule, Rfl: 0    traMADol (ULTRAM) 50 MG tablet, Take 1 tablet by mouth Every 6 (Six) Hours As Needed for Severe Pain (POSTOP PAIN). (Patient not taking: Reported on 2/27/2025), Disp: 10 tablet, Rfl: 0    Past Medical History:   Diagnosis Date    BPH with urinary  "obstruction     Colon cancer 12/2010    Elevated PSA     Frequency of urination     GERD (gastroesophageal reflux disease)     History of heart artery stent     History of tremor     Hydrocele     Hyperlipemia     Iron malabsorption     w/ Anemia    Kidney stones 01/2025    Peritonitis     Testalgia        Past Surgical History:   Procedure Laterality Date    APPENDECTOMY      CARDIAC CATHETERIZATION      COLON RESECTION  12/06/2010    cancer    COLONOSCOPY      CYSTOSCOPY URETEROSCOPY LASER LITHOTRIPSY Left 02/04/2025    Procedure: LEFT URETEROSCOPY LASER LITHOTRIPSY, BASKET STONE EXTRACTION, STENT PLACEMENT;  Surgeon: Derek Hudson MD;  Location: [facility] OR;  Service: Urology;  Laterality: Left;    HERNIA REPAIR  11/15/2004    ORIF FOOT FRACTURE Left 01/01/1990    OTHER SURGICAL HISTORY      Intestinal Blockage x2    PORTACATH PLACEMENT      PROSTATE BIOPSY      UMBILICAL HERNIA REPAIR  1956    VENOUS ACCESS DEVICE (PORT) REMOVAL         Social History     Socioeconomic History    Marital status:    Tobacco Use    Smoking status: Never     Passive exposure: Never    Smokeless tobacco: Never   Vaping Use    Vaping status: Never Used   Substance and Sexual Activity    Alcohol use: Yes    Drug use: Never    Sexual activity: Defer       Family History   Problem Relation Age of Onset    Breast cancer Mother     Heart disease Father     Heart disease Brother        Objective    Temp 96.4 °F (35.8 °C) (Infrared)   Ht 177.8 cm (70\")   Wt 68.6 kg (151 lb 3.2 oz)   BMI 21.69 kg/m²     Physical Exam  Constitutional:       General: He is not in acute distress.     Appearance: Normal appearance. He is not toxic-appearing.   HENT:      Head: Normocephalic and atraumatic.   Pulmonary:      Effort: Pulmonary effort is normal.   Skin:     Coloration: Skin is not pale.   Neurological:      Mental Status: He is alert.   Psychiatric:         Mood and Affect: Mood normal.         Behavior: Behavior normal. "             Assessment and Plan    Diagnoses and all orders for this visit:    1. Left ureteral stone (Primary)    2. Retention of urine  -     tamsulosin (FLOMAX) 0.4 MG capsule 24 hr capsule; Take 2 capsules by mouth Every Night for 30 days.  Dispense: 60 capsule; Refill: 0      Patient had catheter in place status post left uroscopy.  Catheter and stent willing to be removed at the same time here in the office 0-10 2025 however patient was admitted to [facility] due to a small bowel obstruction.  Stent and catheter initially were removed at [facility] however he is had to have catheter replaced on 2 different occasions while at the hospital.  So the catheter he has not placed now is from catheter that was placed at [facility].  Urine is yellow clear we remove the catheter today for voiding trial.    Patient wants to discuss hydrocele with Dr. Hudson in [facility]

## 2025-02-26 NOTE — TELEPHONE ENCOUNTER
Care Transitions Initial Follow Up Call    Outreach made within 2 business days of discharge: Yes    Patient: Fernando Chu Patient : 1945   MRN: [id number]  Reason for Admission: Small Bowel Obstruction  Discharge Date: 2025       Spoke with: Fernando William department/facility: [facility]    TCM Interactive Patient Contact:  Was patient able to fill all prescriptions: Yes  Was patient instructed to bring all medications to the follow-up visit: Yes  Is patient taking all medications as directed in the discharge summary? Yes  Does patient understand their discharge instructions: Yes  Does patient have questions or concerns that need addressed prior to 7-14 day follow up office visit: no    Additional needs identified to be addressed with provider  No needs identified Pt states doing good, having BM.              Scheduled appointment with PCP within 7-14 days    Follow Up  Future Appointments   Date Time Provider Department Center   2025  2:00 PM Misty Blas APRN LPS [facility]CC DEP   2025  1:30 PM [facility]   2025  1:30 PM SCHEDULE, L MED ONC MA L MED ONC [facility]   4/15/2025  9:00 AM Misty Blas APRN LPS [facility] DEP       Phillip Tilley MA

## 2025-02-27 ENCOUNTER — OFFICE VISIT (OUTPATIENT)
Dept: UROLOGY | Facility: CLINIC | Age: 80
End: 2025-02-27
Payer: MEDICARE

## 2025-02-27 VITALS — TEMPERATURE: 96.4 F | HEIGHT: 70 IN | WEIGHT: 151.2 LBS | BODY MASS INDEX: 21.64 KG/M2

## 2025-02-27 DIAGNOSIS — R33.9 RETENTION OF URINE: ICD-10-CM

## 2025-02-27 DIAGNOSIS — N20.1 LEFT URETERAL STONE: Primary | ICD-10-CM

## 2025-02-27 RX ORDER — TAMSULOSIN HYDROCHLORIDE 0.4 MG/1
2 CAPSULE ORAL NIGHTLY
Qty: 60 CAPSULE | Refills: 0 | Status: SHIPPED | OUTPATIENT
Start: 2025-02-27 | End: 2025-03-29

## 2025-02-27 RX ORDER — OXYCODONE AND ACETAMINOPHEN 5; 325 MG/1; MG/1
1 TABLET ORAL
COMMUNITY
Start: 2025-02-25

## 2025-02-27 NOTE — PROGRESS NOTES
Migue Rodriguez is a 79 y.o. male who is here today for catheter removal. Patient of Dr. Hudson. 10cc syringe used to deflate the balloon and the catheter was removed without difficulty. The patient tolerated this well and will return in 1 week to see Dr. Hudson in the office for follow up.      Patient was advised to drink clear fluids for the next couple hours and urinate. The patient was also advised he may experience some blood in the urine and burning with urination for the next couple days. If the patient is unable to urinate or develops fever, chills, N&V or suprapubic pain he will call to return for an appt at clinic or seek medical treatment at [facility] ER, PCP or Urgent Care after hours. Patient verbalized understanding and all questions were answered. VJ Garcia RN.     I have reviewed and agree with medical assistance documentation above

## 2025-02-28 ENCOUNTER — OFFICE VISIT (OUTPATIENT)
Age: 80
End: 2025-02-28

## 2025-02-28 VITALS
BODY MASS INDEX: 22.58 KG/M2 | WEIGHT: 149 LBS | OXYGEN SATURATION: 98 % | HEART RATE: 78 BPM | SYSTOLIC BLOOD PRESSURE: 118 MMHG | HEIGHT: 68 IN | TEMPERATURE: 97.1 F | DIASTOLIC BLOOD PRESSURE: 72 MMHG

## 2025-02-28 DIAGNOSIS — R53.1 GENERAL WEAKNESS: ICD-10-CM

## 2025-02-28 DIAGNOSIS — R53.1 LACK OF STRENGTH: ICD-10-CM

## 2025-02-28 DIAGNOSIS — Z09 HOSPITAL DISCHARGE FOLLOW-UP: Primary | ICD-10-CM

## 2025-02-28 RX ORDER — OXYCODONE AND ACETAMINOPHEN 5; 325 MG/1; MG/1
1 TABLET ORAL EVERY 4 HOURS PRN
COMMUNITY

## 2025-02-28 ASSESSMENT — ENCOUNTER SYMPTOMS
RESPIRATORY NEGATIVE: 1
GASTROINTESTINAL NEGATIVE: 1
EYES NEGATIVE: 1

## 2025-02-28 NOTE — PROGRESS NOTES
Fernando Chu (:  1945) is a 79 y.o. male, Established patient, here for evaluation of the following chief complaint(s):  Follow-Up from Hospital         Assessment & Plan  1. Postoperative status following adhesion lysis for small bowel obstruction: Stable. Admitted on  and discharged on . Second surgery with adhesion removal after the first did not improve his condition. Discharged home with a Dominguez catheter in place due to renal stones. Dominguez catheter removed by urology.  - Referral for physical therapy at [facility] to aid in recovery  - Maintain a diet rich in protein and ensure regular meals to facilitate strength recovery    2. COVID-19: Asymptomatic. Has since resolved    3. Medication management.  - Paxil restarted the day of hospital discharge  - Propranolol 20 mg once daily in the morning  - Flomax adjusted to a more frequent dosing schedule for two weeks, then reduced back to once daily    Follow-up  - Follow-up with Dr. Bernal next week  - Annual wellness visit in 2025    PROCEDURE  Adhesion lysis was performed for small bowel obstruction. A second surgery with adhesion removal was necessary after the first did not improve his condition. Stent placement for kidney stones was performed prior to this admission. The Dominguez catheter was removed by urology.    Results    No results found for this visit on 25.    ICD-10-CM    1. General weakness  R53.1 External Referral To Physical Therapy      2. Lack of strength  R53.1 External Referral To Physical Therapy      3. Hospital discharge follow-up  Z09 External Referral To Physical Therapy         Return if symptoms worsen or fail to improve, for Keep follow up as scheduled.       Subjective   History of Present Illness  The patient was admitted on 2025 and discharged on 2025 for a small bowel obstruction status post adhesion lysis. He also had stent placement for his kidney stones prior to this

## 2025-02-28 NOTE — PROGRESS NOTES
Subjective    Mr. Rodriguez is 79 y.o. male    Chief Complaint: Hydrocele    History of Present Illness  79-year-old male established patient follow-up for enlarged prostate, history of kidney stones, and hydrocele.  He most recently required left ureteroscopy laser lithotripsy basket stone extraction and stent placement on 2/4/2025 for 1.2 cm left proximal ureteral stone at which time postoperative Greenwood catheter was required due to very large friable bleeding prostate on anticoagulation.  He recently required exploratory laparotomy by Dr. Pace at [facility] and had Greenwood catheter placed.  His Greenwood was removed last week and he reports he is voiding satisfactorily on twice daily tamsulosin.  He remains on Plavix and aspirin after recent coronary stenting October 2024.  He remains bothered by right-sided hydrocele.  I reviewed his CT scan from last month demonstrating left ureteral stent in appropriate position with only small left upper pole 2 mm stone fragment visible.    I independently visualized and reviewed the patient's prior imaging studies today in clinic and discussed the imaging findings with the patient.      The following portions of the patient's history were reviewed and updated as appropriate: allergies, current medications, past family history, past medical history, past social history, past surgical history and problem list.    Review of Systems      Current Outpatient Medications:     aspirin 81 MG EC tablet, Take 1 tablet by mouth Daily., Disp: , Rfl:     atorvastatin (LIPITOR) 20 MG tablet, Take 1 tablet by mouth Every Night., Disp: , Rfl:     B Complex-C (SUPER B COMPLEX PO), Take 1 tablet by mouth Daily., Disp: , Rfl:     clopidogrel (PLAVIX) 75 MG tablet, Take 1 tablet by mouth Daily., Disp: , Rfl:     Coenzyme Q10 200 MG capsule, Take 200 mg by mouth Daily., Disp: , Rfl:     docusate sodium (Colace) 100 MG capsule, Take 1 capsule by mouth 2 (Two) Times a Day., Disp: 60 capsule, Rfl:  1    donepezil (ARICEPT) 10 MG tablet, Take 1 tablet by mouth Every Night., Disp: , Rfl:     ferrous sulfate dried ER (Slow Release Iron) 45 MG tablet controlled-release tablet, Take 140 mg by mouth Daily., Disp: , Rfl:     finasteride (PROSCAR) 5 MG tablet, Take 1 tablet by mouth Daily., Disp: 90 tablet, Rfl: 4    hydrOXYzine (ATARAX) 25 MG tablet, Take 1 tablet by mouth 3 (Three) Times a Day., Disp: , Rfl:     levocetirizine (XYZAL) 5 MG tablet, Take 1 tablet by mouth every night at bedtime., Disp: , Rfl:     multivitamin (THERAGRAN) tablet tablet, Take 1 tablet by mouth Daily., Disp: , Rfl:     Omega-3 Fatty Acids (FISH OIL) 1000 MG capsule capsule, Take 1 capsule by mouth Daily With Breakfast., Disp: , Rfl:     omeprazole (priLOSEC) 20 MG capsule, Take 1 capsule by mouth Daily., Disp: , Rfl:     ondansetron ODT (ZOFRAN-ODT) 4 MG disintegrating tablet, Place 1 tablet on the tongue Every 6 (Six) Hours As Needed for Nausea., Disp: 6 tablet, Rfl: 1    oxyCODONE-acetaminophen (PERCOCET) 5-325 MG per tablet, Take 1 tablet by mouth 6 (Six) Times a Day., Disp: , Rfl:     PARoxetine (PAXIL) 20 MG tablet, Take 1 tablet by mouth Daily., Disp: , Rfl:     phenazopyridine (PYRIDIUM) 100 MG tablet, Take 1 tablet by mouth 3 (Three) Times a Day As Needed (urinary burning). (Patient not taking: Reported on 2/27/2025), Disp: 20 tablet, Rfl: 1    propranolol (INDERAL) 20 MG tablet, Take 1 tablet by mouth 2 (Two) Times a Day., Disp: , Rfl:     tamsulosin (FLOMAX) 0.4 MG capsule 24 hr capsule, Take 1 capsule by mouth Every Night. nightly, Disp: 90 capsule, Rfl: 4    tamsulosin (FLOMAX) 0.4 MG capsule 24 hr capsule, Take 2 capsules by mouth Every Night for 30 days., Disp: 60 capsule, Rfl: 0    traMADol (ULTRAM) 50 MG tablet, Take 1 tablet by mouth Every 6 (Six) Hours As Needed for Severe Pain (POSTOP PAIN). (Patient not taking: Reported on 2/27/2025), Disp: 10 tablet, Rfl: 0    Past Medical History:   Diagnosis Date    BPH with  urinary obstruction     Colon cancer 12/2010    Elevated PSA     Frequency of urination     GERD (gastroesophageal reflux disease)     History of heart artery stent     History of tremor     Hydrocele     Hyperlipemia     Iron malabsorption     w/ Anemia    Kidney stones 01/2025    Peritonitis     Testalgia        Past Surgical History:   Procedure Laterality Date    APPENDECTOMY      CARDIAC CATHETERIZATION      COLON RESECTION  12/06/2010    cancer    COLONOSCOPY      CYSTOSCOPY URETEROSCOPY LASER LITHOTRIPSY Left 02/04/2025    Procedure: LEFT URETEROSCOPY LASER LITHOTRIPSY, BASKET STONE EXTRACTION, STENT PLACEMENT;  Surgeon: Derek Hudson MD;  Location: [facility];  Service: Urology;  Laterality: Left;    HERNIA REPAIR  11/15/2004    ORIF FOOT FRACTURE Left 01/01/1990    OTHER SURGICAL HISTORY      Intestinal Blockage x2    PORTACATH PLACEMENT      PROSTATE BIOPSY      UMBILICAL HERNIA REPAIR  1956    VENOUS ACCESS DEVICE (PORT) REMOVAL         Social History     Socioeconomic History    Marital status:    Tobacco Use    Smoking status: Never     Passive exposure: Never    Smokeless tobacco: Never   Vaping Use    Vaping status: Never Used   Substance and Sexual Activity    Alcohol use: Yes    Drug use: Never    Sexual activity: Defer       Family History   Problem Relation Age of Onset    Breast cancer Mother     Heart disease Father     Heart disease Brother        Objective    There were no vitals taken for this visit.    Physical Exam  Large hydrocele present, right testicle not palpable      Results for orders placed or performed during the hospital encounter of 02/04/25   ECG 12 Lead Pre-Op / Pre-Procedure    Collection Time: 02/04/25 10:38 AM   Result Value Ref Range    QT Interval 420 ms    QTC Interval 433 ms   Tissue Pathology Exam    Collection Time: 02/04/25  1:29 PM    Specimen: Ureter, Left; Calculus   Result Value Ref Range    Note to Patients       This report may contain a detailed  "description of human tissue sent by a health care provider to the laboratory for pathologic evaluation. The content of this report is essential for diagnosis and may provide important critical findings. This information may be unfamiliar to patients to review without a medical professional present. It is advised that the patient review this report in the presence of a health care provider who can answer questions and explain the results.      Case Report       Surgical Pathology Report                         Case: [id number]                                  Authorizing Provider:  Derek Hudson MD      Collected:           02/04/2025 01:29 PM          Ordering Location:     [facility] OR  Received:            02/04/2025 03:24 PM          Pathologist:           Hayes Hall MD                                                      Specimen:    Ureter, Left, LEFT URETERAL STONE                                                          Final Diagnosis       Left ureteral stone (gross only):  Renal lithiasis.  The specimen will be sent for x-ray crystal analysis and an outside addendum report will follow.      Gross Description       1. Ureter, Left.  Received fresh in a container without fixative labeled with the patient's name, medical record number and \"left ureteral stone\" is a single fragment of red jagged calculus measuring 0.3 cm in greatest dimension.  No tissue is submitted for histological evaluation.         Assessment and Plan    Diagnoses and all orders for this visit:    1. Other hydrocele (Primary)  -     POC Urinalysis Dipstick, Multipro    2. Urgency of urination  -     tamsulosin (FLOMAX) 0.4 MG capsule 24 hr capsule; Take 1 capsule by mouth 2 (Two) Times a Day. nightly  Dispense: 180 capsule; Refill: 5    3. Benign prostatic hyperplasia with lower urinary tract symptoms, symptom details unspecified  -     finasteride (PROSCAR) 5 MG tablet; Take 1 tablet by mouth Daily.  Dispense: " 90 tablet; Refill: 5      Moderate to large bothersome right hydrocele.  I recommended observation until he is able to go on a baby aspirin likely 12 months after his coronary stenting in October.  Continue twice daily tamsulosin and finasteride for his very large prostate.  He will follow-up in 6 months with Mercedes Cowan to discuss timing of hydrocelectomy.  He will need to obtain cardiac clearance from Dr. Red regarding ability to hold Plavix 1 week prior and continue baby aspirin for hydrocele repair.      This document has been signed by ELEANOR Hudson MD on March 6, 2025 09:26 CST

## 2025-03-06 ENCOUNTER — OFFICE VISIT (OUTPATIENT)
Dept: UROLOGY | Facility: CLINIC | Age: 80
End: 2025-03-06
Payer: MEDICARE

## 2025-03-06 VITALS — BODY MASS INDEX: 21.65 KG/M2 | WEIGHT: 151.24 LBS | HEIGHT: 70 IN

## 2025-03-06 DIAGNOSIS — N40.1 BENIGN PROSTATIC HYPERPLASIA WITH LOWER URINARY TRACT SYMPTOMS, SYMPTOM DETAILS UNSPECIFIED: ICD-10-CM

## 2025-03-06 DIAGNOSIS — R39.15 URGENCY OF URINATION: ICD-10-CM

## 2025-03-06 DIAGNOSIS — N43.2 OTHER HYDROCELE: Primary | ICD-10-CM

## 2025-03-06 LAB
BILIRUB BLD-MCNC: NEGATIVE MG/DL
CLARITY, POC: CLEAR
COLOR UR: YELLOW
GLUCOSE UR STRIP-MCNC: NEGATIVE MG/DL
KETONES UR QL: NEGATIVE
LEUKOCYTE EST, POC: ABNORMAL
NITRITE UR-MCNC: NEGATIVE MG/ML
PH UR: 6.5 [PH] (ref 5–8)
PROT UR STRIP-MCNC: NEGATIVE MG/DL
RBC # UR STRIP: NEGATIVE /UL
SP GR UR: 1.01 (ref 1–1.03)
UROBILINOGEN UR QL: ABNORMAL

## 2025-03-06 RX ORDER — TAMSULOSIN HYDROCHLORIDE 0.4 MG/1
1 CAPSULE ORAL 2 TIMES DAILY
Qty: 180 CAPSULE | Refills: 5 | Status: SHIPPED | OUTPATIENT
Start: 2025-03-06

## 2025-03-06 RX ORDER — FINASTERIDE 5 MG/1
5 TABLET, FILM COATED ORAL DAILY
Qty: 90 TABLET | Refills: 5 | Status: SHIPPED | OUTPATIENT
Start: 2025-03-06

## 2025-03-06 NOTE — LETTER
March 6, 2025     Yuliana Bajwa, APRN  [address] [facility] [address]    Patient: Migue Rodriguez   YOB: 1945   Date of Visit: 3/6/2025     Dear Yuliana,    Thank you for referring Migue Rodriguez to me for evaluation. Below are the relevant portions of my assessment and plan of care.    If you have questions, please do not hesitate to call me. I look forward to following Migue along with you.         Sincerely,        Derek Hudson MD        CC: No Recipients      Progress Notes:  Subjective    Mr. Rodriguez is 79 y.o. male    Chief Complaint: Hydrocele    History of Present Illness  79-year-old male established patient follow-up for enlarged prostate, history of kidney stones, and hydrocele.  He most recently required left ureteroscopy laser lithotripsy basket stone extraction and stent placement on 2/4/2025 for 1.2 cm left proximal ureteral stone at which time postoperative Greenwood catheter was required due to very large friable bleeding prostate on anticoagulation.  He recently required exploratory laparotomy by Dr. Pace at [facility] and had Greenwood catheter placed.  His Greenwood was removed last week and he reports he is voiding satisfactorily on twice daily tamsulosin.  He remains on Plavix and aspirin after recent coronary stenting October 2024.  He remains bothered by right-sided hydrocele.  I reviewed his CT scan from last month demonstrating left ureteral stent in appropriate position with only small left upper pole 2 mm stone fragment visible.    I independently visualized and reviewed the patient's prior imaging studies today in clinic and discussed the imaging findings with the patient.      The following portions of the patient's history were reviewed and updated as appropriate: allergies, current medications, past family history, past medical history, past social history, past surgical history and problem list.    Review of Systems      Current Outpatient Medications:   •   aspirin 81 MG EC tablet, Take 1 tablet by mouth Daily., Disp: , Rfl:   •  atorvastatin (LIPITOR) 20 MG tablet, Take 1 tablet by mouth Every Night., Disp: , Rfl:   •  B Complex-C (SUPER B COMPLEX PO), Take 1 tablet by mouth Daily., Disp: , Rfl:   •  clopidogrel (PLAVIX) 75 MG tablet, Take 1 tablet by mouth Daily., Disp: , Rfl:   •  Coenzyme Q10 200 MG capsule, Take 200 mg by mouth Daily., Disp: , Rfl:   •  docusate sodium (Colace) 100 MG capsule, Take 1 capsule by mouth 2 (Two) Times a Day., Disp: 60 capsule, Rfl: 1  •  donepezil (ARICEPT) 10 MG tablet, Take 1 tablet by mouth Every Night., Disp: , Rfl:   •  ferrous sulfate dried ER (Slow Release Iron) 45 MG tablet controlled-release tablet, Take 140 mg by mouth Daily., Disp: , Rfl:   •  finasteride (PROSCAR) 5 MG tablet, Take 1 tablet by mouth Daily., Disp: 90 tablet, Rfl: 4  •  hydrOXYzine (ATARAX) 25 MG tablet, Take 1 tablet by mouth 3 (Three) Times a Day., Disp: , Rfl:   •  levocetirizine (XYZAL) 5 MG tablet, Take 1 tablet by mouth every night at bedtime., Disp: , Rfl:   •  multivitamin (THERAGRAN) tablet tablet, Take 1 tablet by mouth Daily., Disp: , Rfl:   •  Omega-3 Fatty Acids (FISH OIL) 1000 MG capsule capsule, Take 1 capsule by mouth Daily With Breakfast., Disp: , Rfl:   •  omeprazole (priLOSEC) 20 MG capsule, Take 1 capsule by mouth Daily., Disp: , Rfl:   •  ondansetron ODT (ZOFRAN-ODT) 4 MG disintegrating tablet, Place 1 tablet on the tongue Every 6 (Six) Hours As Needed for Nausea., Disp: 6 tablet, Rfl: 1  •  oxyCODONE-acetaminophen (PERCOCET) 5-325 MG per tablet, Take 1 tablet by mouth 6 (Six) Times a Day., Disp: , Rfl:   •  PARoxetine (PAXIL) 20 MG tablet, Take 1 tablet by mouth Daily., Disp: , Rfl:   •  phenazopyridine (PYRIDIUM) 100 MG tablet, Take 1 tablet by mouth 3 (Three) Times a Day As Needed (urinary burning). (Patient not taking: Reported on 2/27/2025), Disp: 20 tablet, Rfl: 1  •  propranolol (INDERAL) 20 MG tablet, Take 1 tablet by mouth 2  (Two) Times a Day., Disp: , Rfl:   •  tamsulosin (FLOMAX) 0.4 MG capsule 24 hr capsule, Take 1 capsule by mouth Every Night. nightly, Disp: 90 capsule, Rfl: 4  •  tamsulosin (FLOMAX) 0.4 MG capsule 24 hr capsule, Take 2 capsules by mouth Every Night for 30 days., Disp: 60 capsule, Rfl: 0  •  traMADol (ULTRAM) 50 MG tablet, Take 1 tablet by mouth Every 6 (Six) Hours As Needed for Severe Pain (POSTOP PAIN). (Patient not taking: Reported on 2/27/2025), Disp: 10 tablet, Rfl: 0    Past Medical History:   Diagnosis Date   • BPH with urinary obstruction    • Colon cancer 12/2010   • Elevated PSA    • Frequency of urination    • GERD (gastroesophageal reflux disease)    • History of heart artery stent    • History of tremor    • Hydrocele    • Hyperlipemia    • Iron malabsorption     w/ Anemia   • Kidney stones 01/2025   • Peritonitis    • Testalgia        Past Surgical History:   Procedure Laterality Date   • APPENDECTOMY     • CARDIAC CATHETERIZATION     • COLON RESECTION  12/06/2010    cancer   • COLONOSCOPY     • CYSTOSCOPY URETEROSCOPY LASER LITHOTRIPSY Left 02/04/2025    Procedure: LEFT URETEROSCOPY LASER LITHOTRIPSY, BASKET STONE EXTRACTION, STENT PLACEMENT;  Surgeon: Derek Hudson MD;  Location: [facility];  Service: Urology;  Laterality: Left;   • HERNIA REPAIR  11/15/2004   • ORIF FOOT FRACTURE Left 01/01/1990   • OTHER SURGICAL HISTORY      Intestinal Blockage x2   • PORTACATH PLACEMENT     • PROSTATE BIOPSY     • UMBILICAL HERNIA REPAIR  1956   • VENOUS ACCESS DEVICE (PORT) REMOVAL         Social History     Socioeconomic History   • Marital status:    Tobacco Use   • Smoking status: Never     Passive exposure: Never   • Smokeless tobacco: Never   Vaping Use   • Vaping status: Never Used   Substance and Sexual Activity   • Alcohol use: Yes   • Drug use: Never   • Sexual activity: Defer       Family History   Problem Relation Age of Onset   • Breast cancer Mother    • Heart disease Father    • Heart  "disease Brother        Objective    There were no vitals taken for this visit.    Physical Exam  Large hydrocele present, right testicle not palpable      Results for orders placed or performed during the hospital encounter of 02/04/25   ECG 12 Lead Pre-Op / Pre-Procedure    Collection Time: 02/04/25 10:38 AM   Result Value Ref Range    QT Interval 420 ms    QTC Interval 433 ms   Tissue Pathology Exam    Collection Time: 02/04/25  1:29 PM    Specimen: Ureter, Left; Calculus   Result Value Ref Range    Note to Patients       This report may contain a detailed description of human tissue sent by a health care provider to the laboratory for pathologic evaluation. The content of this report is essential for diagnosis and may provide important critical findings. This information may be unfamiliar to patients to review without a medical professional present. It is advised that the patient review this report in the presence of a health care provider who can answer questions and explain the results.      Case Report       Surgical Pathology Report                         Case: [id number]                                  Authorizing Provider:  Derek Hudson MD      Collected:           02/04/2025 01:29 PM          Ordering Location:     [facility] OR  Received:            02/04/2025 03:24 PM          Pathologist:           Hayes Hall MD                                                      Specimen:    Ureter, Left, LEFT URETERAL STONE                                                          Final Diagnosis       Left ureteral stone (gross only):  Renal lithiasis.  The specimen will be sent for x-ray crystal analysis and an outside addendum report will follow.      Gross Description       1. Ureter, Left.  Received fresh in a container without fixative labeled with the patient's name, medical record number and \"left ureteral stone\" is a single fragment of red jagged calculus measuring 0.3 cm in " greatest dimension.  No tissue is submitted for histological evaluation.         Assessment and Plan    Diagnoses and all orders for this visit:    1. Other hydrocele (Primary)  -     POC Urinalysis Dipstick, Multipro    2. Urgency of urination  -     tamsulosin (FLOMAX) 0.4 MG capsule 24 hr capsule; Take 1 capsule by mouth 2 (Two) Times a Day. nightly  Dispense: 180 capsule; Refill: 5    3. Benign prostatic hyperplasia with lower urinary tract symptoms, symptom details unspecified  -     finasteride (PROSCAR) 5 MG tablet; Take 1 tablet by mouth Daily.  Dispense: 90 tablet; Refill: 5      Moderate to large bothersome right hydrocele.  I recommended observation until he is able to go on a baby aspirin likely 12 months after his coronary stenting in October.  Continue twice daily tamsulosin and finasteride for his very large prostate.  He will follow-up in 6 months with Mercedes in [place] to discuss timing of hydrocelectomy.  He will need to obtain cardiac clearance from Dr. Red regarding ability to hold Plavix 1 week prior and continue baby aspirin for hydrocele repair.      This document has been signed by ELEANOR Hudson MD on March 6, 2025 09:26 CST

## 2025-03-14 DIAGNOSIS — K29.80 DUODENITIS: ICD-10-CM

## 2025-03-14 RX ORDER — OMEPRAZOLE 20 MG/1
20 CAPSULE, DELAYED RELEASE ORAL
Qty: 30 CAPSULE | Refills: 5 | Status: SHIPPED | OUTPATIENT
Start: 2025-03-14

## 2025-03-14 NOTE — TELEPHONE ENCOUNTER
Fernando Chu called to request a refill on his medication.      Last office visit : 1/2/2025   Next office visit : Visit date not found     Requested Prescriptions     Pending Prescriptions Disp Refills    omeprazole (PRILOSEC) 20 MG delayed release capsule [Pharmacy Med Name: omeprazole 20 mg capsule,delayed release] 30 capsule 0     Sig: TAKE ONE CAPSULE BY MOUTH EVERY MORNING BEFORE BREAKFAST            Rena Cao MA

## 2025-03-26 DIAGNOSIS — R63.4 WEIGHT LOSS: ICD-10-CM

## 2025-03-26 DIAGNOSIS — F32.0 CURRENT MILD EPISODE OF MAJOR DEPRESSIVE DISORDER WITHOUT PRIOR EPISODE: ICD-10-CM

## 2025-03-26 RX ORDER — PAROXETINE 20 MG/1
20 TABLET, FILM COATED ORAL DAILY
Qty: 30 TABLET | Refills: 11 | Status: SHIPPED | OUTPATIENT
Start: 2025-03-26

## 2025-03-26 NOTE — TELEPHONE ENCOUNTER
Received fax from pharmacy requesting refill on pts medication(s). Pt was last seen in office on 1/2/2025  and has a follow up scheduled for Visit date not found. Will send request to  Misty Blas  for authorization.     Requested Prescriptions     Pending Prescriptions Disp Refills    PARoxetine (PAXIL) 20 MG tablet [Pharmacy Med Name: paroxetine 20 mg tablet] 30 tablet 11     Sig: TAKE ONE TABLET BY MOUTH DAILY

## 2025-03-27 ENCOUNTER — TELEPHONE (OUTPATIENT)
Dept: HEMATOLOGY | Age: 80
End: 2025-03-27

## 2025-03-27 NOTE — TELEPHONE ENCOUNTER
Called patient today for their upcoming appointment on 04/01/2025 and patient needed to be rescheduled. Patient's name and number was taken and given to  staff ( sent to  via teams    )  so that the patient could be rescheduled to a better date & time for the patient.        Pt states he wants to cancel appt for now due to him just getting out of the hospital.

## 2025-04-15 ENCOUNTER — OFFICE VISIT (OUTPATIENT)
Age: 80
End: 2025-04-15
Payer: MEDICARE

## 2025-04-15 VITALS
OXYGEN SATURATION: 98 % | TEMPERATURE: 97.1 F | HEART RATE: 60 BPM | HEIGHT: 68 IN | SYSTOLIC BLOOD PRESSURE: 118 MMHG | DIASTOLIC BLOOD PRESSURE: 72 MMHG | WEIGHT: 154 LBS | BODY MASS INDEX: 23.34 KG/M2

## 2025-04-15 DIAGNOSIS — Z12.5 ENCOUNTER FOR SPECIAL SCREENING EXAMINATION FOR NEOPLASM OF PROSTATE: ICD-10-CM

## 2025-04-15 DIAGNOSIS — R73.09 ELEVATED GLUCOSE: ICD-10-CM

## 2025-04-15 DIAGNOSIS — E78.5 HYPERLIPIDEMIA, UNSPECIFIED HYPERLIPIDEMIA TYPE: ICD-10-CM

## 2025-04-15 DIAGNOSIS — I10 ESSENTIAL HYPERTENSION: ICD-10-CM

## 2025-04-15 DIAGNOSIS — Z00.00 MEDICARE ANNUAL WELLNESS VISIT, SUBSEQUENT: Primary | ICD-10-CM

## 2025-04-15 DIAGNOSIS — F41.9 ANXIETY: ICD-10-CM

## 2025-04-15 DIAGNOSIS — G25.0 BENIGN ESSENTIAL TREMOR: ICD-10-CM

## 2025-04-15 PROCEDURE — G0439 PPPS, SUBSEQ VISIT: HCPCS | Performed by: NURSE PRACTITIONER

## 2025-04-15 PROCEDURE — 1123F ACP DISCUSS/DSCN MKR DOCD: CPT | Performed by: NURSE PRACTITIONER

## 2025-04-15 PROCEDURE — 3078F DIAST BP <80 MM HG: CPT | Performed by: NURSE PRACTITIONER

## 2025-04-15 PROCEDURE — 1160F RVW MEDS BY RX/DR IN RCRD: CPT | Performed by: NURSE PRACTITIONER

## 2025-04-15 PROCEDURE — 1159F MED LIST DOCD IN RCRD: CPT | Performed by: NURSE PRACTITIONER

## 2025-04-15 PROCEDURE — 3074F SYST BP LT 130 MM HG: CPT | Performed by: NURSE PRACTITIONER

## 2025-04-15 ASSESSMENT — LIFESTYLE VARIABLES
HOW OFTEN DO YOU HAVE A DRINK CONTAINING ALCOHOL: 2-3 TIMES A WEEK
HOW MANY STANDARD DRINKS CONTAINING ALCOHOL DO YOU HAVE ON A TYPICAL DAY: 1 OR 2
HAVE YOU OR SOMEONE ELSE BEEN INJURED AS A RESULT OF YOUR DRINKING: NO
HOW OFTEN DURING THE LAST YEAR HAVE YOU BEEN UNABLE TO REMEMBER WHAT HAPPENED THE NIGHT BEFORE BECAUSE YOU HAD BEEN DRINKING: NEVER
HOW OFTEN DURING THE LAST YEAR HAVE YOU FAILED TO DO WHAT WAS NORMALLY EXPECTED FROM YOU BECAUSE OF DRINKING: NEVER
HOW OFTEN DURING THE LAST YEAR HAVE YOU NEEDED AN ALCOHOLIC DRINK FIRST THING IN THE MORNING TO GET YOURSELF GOING AFTER A NIGHT OF HEAVY DRINKING: NEVER
HAS A RELATIVE, FRIEND, DOCTOR, OR ANOTHER HEALTH PROFESSIONAL EXPRESSED CONCERN ABOUT YOUR DRINKING OR SUGGESTED YOU CUT DOWN: NO
HOW OFTEN DURING THE LAST YEAR HAVE YOU HAD A FEELING OF GUILT OR REMORSE AFTER DRINKING: NEVER
HOW OFTEN DURING THE LAST YEAR HAVE YOU FOUND THAT YOU WERE NOT ABLE TO STOP DRINKING ONCE YOU HAD STARTED: NEVER

## 2025-04-15 ASSESSMENT — PATIENT HEALTH QUESTIONNAIRE - PHQ9
8. MOVING OR SPEAKING SO SLOWLY THAT OTHER PEOPLE COULD HAVE NOTICED. OR THE OPPOSITE, BEING SO FIGETY OR RESTLESS THAT YOU HAVE BEEN MOVING AROUND A LOT MORE THAN USUAL: NOT AT ALL
4. FEELING TIRED OR HAVING LITTLE ENERGY: NOT AT ALL
3. TROUBLE FALLING OR STAYING ASLEEP: NOT AT ALL
5. POOR APPETITE OR OVEREATING: NOT AT ALL
10. IF YOU CHECKED OFF ANY PROBLEMS, HOW DIFFICULT HAVE THESE PROBLEMS MADE IT FOR YOU TO DO YOUR WORK, TAKE CARE OF THINGS AT HOME, OR GET ALONG WITH OTHER PEOPLE: NOT DIFFICULT AT ALL
SUM OF ALL RESPONSES TO PHQ QUESTIONS 1-9: 0
2. FEELING DOWN, DEPRESSED OR HOPELESS: NOT AT ALL
SUM OF ALL RESPONSES TO PHQ QUESTIONS 1-9: 0
6. FEELING BAD ABOUT YOURSELF - OR THAT YOU ARE A FAILURE OR HAVE LET YOURSELF OR YOUR FAMILY DOWN: NOT AT ALL
SUM OF ALL RESPONSES TO PHQ QUESTIONS 1-9: 0
1. LITTLE INTEREST OR PLEASURE IN DOING THINGS: NOT AT ALL
9. THOUGHTS THAT YOU WOULD BE BETTER OFF DEAD, OR OF HURTING YOURSELF: NOT AT ALL
SUM OF ALL RESPONSES TO PHQ QUESTIONS 1-9: 0
7. TROUBLE CONCENTRATING ON THINGS, SUCH AS READING THE NEWSPAPER OR WATCHING TELEVISION: NOT AT ALL

## 2025-04-15 NOTE — PATIENT INSTRUCTIONS
backpack.  Try to walk in well-lit areas. Watch out for uneven ground, changes in pavement, and debris.  Be careful in the winter. Walk on the grass or gravel when sidewalks are slippery. Use de-icer on steps and walkways. Add non-slip devices to shoes.    Put grab bars and nonskid mats in your shower or tub and near the toilet. Try to use a shower chair or bath bench when bathing.   Get into a tub or shower by putting in your weaker leg first. Get out with your strong side first. Have a phone or medical alert device in the bathroom with you.   Where can you learn more?  Go to https://www.[employer].net/patientEd and enter G117 to learn more about \"Preventing Falls: Care Instructions.\"  Current as of: July 31, 2024  Content Version: 14.4  © 8926-8661 [employer].   Care instructions adapted under license by [employer]. If you have questions about a medical condition or this instruction, always ask your healthcare professional. [employer], disclaims any warranty or liability for your use of this information.         A Healthy Heart: Care Instructions  Overview     Coronary artery disease, also called heart disease, occurs when a substance called plaque builds up in the vessels that supply oxygen-rich blood to your heart muscle. This can narrow the blood vessels and reduce blood flow. A heart attack happens when blood flow is completely blocked. A high-fat diet, smoking, and other factors increase the risk of heart disease.  Your doctor has found that you have a chance of having heart disease. A heart-healthy lifestyle can help keep your heart healthy and prevent heart disease. This lifestyle includes eating healthy, being active, staying at a weight that's healthy for you, and not smoking or using tobacco. It also includes taking medicines as directed, managing other health conditions, and trying to get a healthy amount of sleep.  Follow-up care is a key part of your treatment and safety. Be

## 2025-04-15 NOTE — PROGRESS NOTES
Medicare Annual Wellness Visit    Fernando Chu is here for Medicare AWV    Assessment & Plan  1. Health maintenance: Stable.  - Demonstrated satisfactory performance on the Medicare wellness questionnaire, except for dental and vision care queries.  - Cholesterol levels have not been assessed since 08/2023; current with all other aspects of healthcare.  - Comprehensive panel of annual laboratory tests, including cholesterol levels, will be ordered; advised to complete these tests within the next month.  - Refills for Paxil provided last month, ensuring a year's supply; should have plenty of refills on all medications by August 2025. If any issues arise before then, contact is advised.    Follow-up  - Follow-up appointment scheduled in 4 months.    Medicare annual wellness visit, subsequent  Results         No follow-ups on file.     Subjective   The following acute and/or chronic problems were also addressed today:  none  History of Present Illness  The patient presents for a wellness visit.    Overall Health Status  An improvement in overall health status is reported, with a weight gain of approximately 6 pounds since the last consultation. The sense of taste remains intact. Physical activity, specifically golf, has been engaged in without any associated issues. There are no adverse effects from the current medication regimen.  - Onset: Since the last consultation.  - Duration: Ongoing improvement.  - Character: Weight gain, intact sense of taste, physical activity without issues.  - Severity: No adverse effects from medication regimen.      Patient's complete Health Risk Assessment and screening values have been reviewed and are found in Flowsheets. The following problems were reviewed today and where indicated follow up appointments were made and/or referrals ordered.    Positive Risk Factor Screenings with Interventions:          Controlled Medication Review:    Today's Pain Level: No data recorded   Opioid

## 2025-05-16 DIAGNOSIS — G25.0 BENIGN ESSENTIAL TREMOR: ICD-10-CM

## 2025-05-16 DIAGNOSIS — Z12.5 ENCOUNTER FOR SPECIAL SCREENING EXAMINATION FOR NEOPLASM OF PROSTATE: ICD-10-CM

## 2025-05-16 DIAGNOSIS — E78.5 HYPERLIPIDEMIA, UNSPECIFIED HYPERLIPIDEMIA TYPE: ICD-10-CM

## 2025-05-16 DIAGNOSIS — R73.09 ELEVATED GLUCOSE: ICD-10-CM

## 2025-05-16 LAB
ALBUMIN SERPL-MCNC: 3.6 G/DL (ref 3.5–5.2)
ALP SERPL-CCNC: 111 U/L (ref 40–129)
ALT SERPL-CCNC: 18 U/L (ref 10–50)
ANION GAP SERPL CALCULATED.3IONS-SCNC: 10 MMOL/L (ref 8–16)
AST SERPL-CCNC: 23 U/L (ref 10–50)
BILIRUB SERPL-MCNC: 0.3 MG/DL (ref 0.2–1.2)
BUN SERPL-MCNC: 18 MG/DL (ref 8–23)
CALCIUM SERPL-MCNC: 9 MG/DL (ref 8.8–10.2)
CHLORIDE SERPL-SCNC: 107 MMOL/L (ref 98–107)
CHOLEST SERPL-MCNC: 124 MG/DL (ref 0–199)
CO2 SERPL-SCNC: 23 MMOL/L (ref 22–29)
CREAT SERPL-MCNC: 0.9 MG/DL (ref 0.7–1.2)
ERYTHROCYTE [DISTWIDTH] IN BLOOD BY AUTOMATED COUNT: 13.3 % (ref 11.5–14.5)
GLUCOSE SERPL-MCNC: 145 MG/DL (ref 70–99)
HBA1C MFR BLD: 5.2 % (ref 4–5.6)
HCT VFR BLD AUTO: 35.4 % (ref 42–52)
HDLC SERPL-MCNC: 56 MG/DL (ref 40–60)
HGB BLD-MCNC: 11.3 G/DL (ref 14–18)
LDLC SERPL CALC-MCNC: 59 MG/DL
MCH RBC QN AUTO: 31.7 PG (ref 27–31)
MCHC RBC AUTO-ENTMCNC: 31.9 G/DL (ref 33–37)
MCV RBC AUTO: 99.2 FL (ref 80–94)
PLATELET # BLD AUTO: 202 K/UL (ref 130–400)
PMV BLD AUTO: 9.8 FL (ref 9.4–12.4)
POTASSIUM SERPL-SCNC: 4.7 MMOL/L (ref 3.5–5.1)
PROT SERPL-MCNC: 6.2 G/DL (ref 6.4–8.3)
PSA SERPL-MCNC: 3.31 NG/ML (ref 0–4)
RBC # BLD AUTO: 3.57 M/UL (ref 4.7–6.1)
SODIUM SERPL-SCNC: 140 MMOL/L (ref 136–145)
TRIGL SERPL-MCNC: 47 MG/DL (ref 0–149)
TSH SERPL DL<=0.005 MIU/L-ACNC: 2.56 UIU/ML (ref 0.27–4.2)
WBC # BLD AUTO: 5.3 K/UL (ref 4.8–10.8)

## 2025-05-19 ENCOUNTER — RESULTS FOLLOW-UP (OUTPATIENT)
Age: 80
End: 2025-05-19

## 2025-06-09 NOTE — TELEPHONE ENCOUNTER
Fernando called requesting a refill of the below medication which has been pended for you:     Requested Prescriptions     Pending Prescriptions Disp Refills    propranolol (INDERAL) 20 MG tablet [Pharmacy Med Name: propranolol 20 mg tablet] 60 tablet 3     Sig: TAKE ONE TABLET BY MOUTH TWICE DAILY       Last Appointment Date: 4/15/2025  Next Appointment Date: 8/15/2025    Allergies   Allergen Reactions    Demerol Hcl [Meperidine] Other (See Comments)     Hypotension

## 2025-06-10 RX ORDER — PROPRANOLOL HCL 20 MG
20 TABLET ORAL 2 TIMES DAILY
Qty: 60 TABLET | Refills: 3 | Status: SHIPPED | OUTPATIENT
Start: 2025-06-10

## 2025-06-30 ENCOUNTER — HOSPITAL ENCOUNTER (INPATIENT)
Age: 80
LOS: 5 days | Discharge: HOME | DRG: 281 | End: 2025-07-05
Attending: STUDENT IN AN ORGANIZED HEALTH CARE EDUCATION/TRAINING PROGRAM | Admitting: INTERNAL MEDICINE
Payer: MEDICARE

## 2025-06-30 DIAGNOSIS — R00.2 PALPITATIONS: ICD-10-CM

## 2025-06-30 DIAGNOSIS — G45.9 TIA (TRANSIENT ISCHEMIC ATTACK): ICD-10-CM

## 2025-06-30 DIAGNOSIS — R55 SYNCOPE, UNSPECIFIED SYNCOPE TYPE: Primary | ICD-10-CM

## 2025-06-30 PROBLEM — R79.89 ELEVATED TROPONIN: Status: ACTIVE | Noted: 2025-06-30

## 2025-06-30 PROBLEM — R50.9 FEVER: Status: ACTIVE | Noted: 2025-06-30

## 2025-06-30 PROBLEM — R74.8 ELEVATED CK: Status: ACTIVE | Noted: 2025-06-30

## 2025-06-30 LAB
ALBUMIN SERPL-MCNC: 2.7 G/DL (ref 3.5–5.2)
ALP SERPL-CCNC: 126 U/L (ref 40–129)
ALT SERPL-CCNC: 27 U/L (ref 10–50)
ANION GAP SERPL CALCULATED.3IONS-SCNC: 11 MMOL/L (ref 8–16)
AST SERPL-CCNC: 60 U/L (ref 10–50)
BILIRUB SERPL-MCNC: 0.8 MG/DL (ref 0.2–1.2)
BUN SERPL-MCNC: 24 MG/DL (ref 8–23)
CALCIUM SERPL-MCNC: 7.8 MG/DL (ref 8.8–10.2)
CHLORIDE SERPL-SCNC: 106 MMOL/L (ref 98–107)
CK SERPL-CCNC: 666 U/L (ref 39–308)
CO2 SERPL-SCNC: 20 MMOL/L (ref 22–29)
CREAT SERPL-MCNC: 1.2 MG/DL (ref 0.7–1.2)
GLUCOSE SERPL-MCNC: 113 MG/DL (ref 70–99)
HBA1C MFR BLD: 6.3 % (ref 4–5.6)
LACTATE BLDV-SCNC: 1.1 MMOL/L (ref 0.5–1.9)
MAGNESIUM SERPL-MCNC: 1.7 MG/DL (ref 1.6–2.4)
POTASSIUM SERPL-SCNC: 3.7 MMOL/L (ref 3.5–5.1)
PROT SERPL-MCNC: 5.8 G/DL (ref 6.4–8.3)
SODIUM SERPL-SCNC: 137 MMOL/L (ref 136–145)
TROPONIN, HIGH SENSITIVITY: 75 NG/L (ref 0–22)
TSH SERPL DL<=0.005 MIU/L-ACNC: 5.81 UIU/ML (ref 0.27–4.2)

## 2025-06-30 PROCEDURE — 87150 DNA/RNA AMPLIFIED PROBE: CPT

## 2025-06-30 PROCEDURE — 87186 SC STD MICRODIL/AGAR DIL: CPT

## 2025-06-30 PROCEDURE — 6360000002 HC RX W HCPCS: Performed by: STUDENT IN AN ORGANIZED HEALTH CARE EDUCATION/TRAINING PROGRAM

## 2025-06-30 PROCEDURE — 1200000000 HC SEMI PRIVATE

## 2025-06-30 PROCEDURE — 36415 COLL VENOUS BLD VENIPUNCTURE: CPT

## 2025-06-30 PROCEDURE — 87040 BLOOD CULTURE FOR BACTERIA: CPT

## 2025-06-30 PROCEDURE — 87077 CULTURE AEROBIC IDENTIFY: CPT

## 2025-06-30 RX ORDER — ENOXAPARIN SODIUM 100 MG/ML
40 INJECTION SUBCUTANEOUS DAILY
Status: DISCONTINUED | OUTPATIENT
Start: 2025-07-01 | End: 2025-07-05 | Stop reason: HOSPADM

## 2025-06-30 RX ORDER — POTASSIUM CHLORIDE 7.45 MG/ML
10 INJECTION INTRAVENOUS PRN
Status: DISCONTINUED | OUTPATIENT
Start: 2025-06-30 | End: 2025-07-05 | Stop reason: HOSPADM

## 2025-06-30 RX ORDER — ONDANSETRON 4 MG/1
4 TABLET, ORALLY DISINTEGRATING ORAL EVERY 8 HOURS PRN
Status: DISCONTINUED | OUTPATIENT
Start: 2025-06-30 | End: 2025-07-05 | Stop reason: HOSPADM

## 2025-06-30 RX ORDER — SODIUM CHLORIDE 0.9 % (FLUSH) 0.9 %
5-40 SYRINGE (ML) INJECTION PRN
Status: DISCONTINUED | OUTPATIENT
Start: 2025-06-30 | End: 2025-06-30 | Stop reason: SDUPTHER

## 2025-06-30 RX ORDER — 0.9 % SODIUM CHLORIDE 0.9 %
1000 INTRAVENOUS SOLUTION INTRAVENOUS ONCE
Status: DISCONTINUED | OUTPATIENT
Start: 2025-06-30 | End: 2025-07-01

## 2025-06-30 RX ORDER — SODIUM CHLORIDE 9 MG/ML
INJECTION, SOLUTION INTRAVENOUS PRN
Status: DISCONTINUED | OUTPATIENT
Start: 2025-06-30 | End: 2025-07-05 | Stop reason: HOSPADM

## 2025-06-30 RX ORDER — OXYCODONE AND ACETAMINOPHEN 5; 325 MG/1; MG/1
1 TABLET ORAL EVERY 4 HOURS PRN
Status: DISCONTINUED | OUTPATIENT
Start: 2025-06-30 | End: 2025-07-01

## 2025-06-30 RX ORDER — HYDROMORPHONE HYDROCHLORIDE 1 MG/ML
0.5 INJECTION, SOLUTION INTRAMUSCULAR; INTRAVENOUS; SUBCUTANEOUS EVERY 6 HOURS PRN
Status: DISCONTINUED | OUTPATIENT
Start: 2025-06-30 | End: 2025-07-01

## 2025-06-30 RX ORDER — PAROXETINE 10 MG/1
20 TABLET, FILM COATED ORAL DAILY
Status: DISCONTINUED | OUTPATIENT
Start: 2025-07-01 | End: 2025-07-05 | Stop reason: HOSPADM

## 2025-06-30 RX ORDER — ATORVASTATIN CALCIUM 20 MG/1
20 TABLET, FILM COATED ORAL NIGHTLY
Status: DISCONTINUED | OUTPATIENT
Start: 2025-06-30 | End: 2025-07-05 | Stop reason: HOSPADM

## 2025-06-30 RX ORDER — ACETAMINOPHEN 325 MG/1
650 TABLET ORAL EVERY 6 HOURS PRN
Status: DISCONTINUED | OUTPATIENT
Start: 2025-06-30 | End: 2025-07-05 | Stop reason: HOSPADM

## 2025-06-30 RX ORDER — ASPIRIN 81 MG/1
81 TABLET ORAL DAILY
Status: DISCONTINUED | OUTPATIENT
Start: 2025-07-01 | End: 2025-07-05 | Stop reason: HOSPADM

## 2025-06-30 RX ORDER — HYDROCODONE BITARTRATE AND ACETAMINOPHEN 5; 325 MG/1; MG/1
1 TABLET ORAL ONCE
Status: DISCONTINUED | OUTPATIENT
Start: 2025-06-30 | End: 2025-06-30

## 2025-06-30 RX ORDER — ONDANSETRON 2 MG/ML
4 INJECTION INTRAMUSCULAR; INTRAVENOUS EVERY 6 HOURS PRN
Status: DISCONTINUED | OUTPATIENT
Start: 2025-06-30 | End: 2025-07-05 | Stop reason: HOSPADM

## 2025-06-30 RX ORDER — SODIUM CHLORIDE 9 MG/ML
INJECTION, SOLUTION INTRAVENOUS PRN
Status: DISCONTINUED | OUTPATIENT
Start: 2025-06-30 | End: 2025-06-30 | Stop reason: SDUPTHER

## 2025-06-30 RX ORDER — SODIUM CHLORIDE 0.9 % (FLUSH) 0.9 %
5-40 SYRINGE (ML) INJECTION EVERY 12 HOURS SCHEDULED
Status: DISCONTINUED | OUTPATIENT
Start: 2025-06-30 | End: 2025-07-01

## 2025-06-30 RX ORDER — PANTOPRAZOLE SODIUM 40 MG/1
40 TABLET, DELAYED RELEASE ORAL
Status: DISCONTINUED | OUTPATIENT
Start: 2025-07-01 | End: 2025-07-05 | Stop reason: HOSPADM

## 2025-06-30 RX ORDER — SODIUM CHLORIDE 9 MG/ML
INJECTION, SOLUTION INTRAVENOUS CONTINUOUS
Status: DISCONTINUED | OUTPATIENT
Start: 2025-06-30 | End: 2025-07-01

## 2025-06-30 RX ORDER — SODIUM CHLORIDE 0.9 % (FLUSH) 0.9 %
5-40 SYRINGE (ML) INJECTION PRN
Status: DISCONTINUED | OUTPATIENT
Start: 2025-06-30 | End: 2025-07-05 | Stop reason: HOSPADM

## 2025-06-30 RX ORDER — POTASSIUM CHLORIDE 1500 MG/1
40 TABLET, EXTENDED RELEASE ORAL PRN
Status: DISCONTINUED | OUTPATIENT
Start: 2025-06-30 | End: 2025-07-05 | Stop reason: HOSPADM

## 2025-06-30 RX ORDER — SODIUM CHLORIDE 0.9 % (FLUSH) 0.9 %
5-40 SYRINGE (ML) INJECTION EVERY 12 HOURS SCHEDULED
Status: DISCONTINUED | OUTPATIENT
Start: 2025-06-30 | End: 2025-06-30 | Stop reason: SDUPTHER

## 2025-06-30 RX ORDER — ACETAMINOPHEN 650 MG/1
650 SUPPOSITORY RECTAL EVERY 6 HOURS PRN
Status: DISCONTINUED | OUTPATIENT
Start: 2025-06-30 | End: 2025-07-05 | Stop reason: HOSPADM

## 2025-06-30 RX ORDER — DONEPEZIL HYDROCHLORIDE 10 MG/1
10 TABLET, FILM COATED ORAL NIGHTLY
Status: DISCONTINUED | OUTPATIENT
Start: 2025-06-30 | End: 2025-07-01

## 2025-06-30 RX ORDER — CLOPIDOGREL BISULFATE 75 MG/1
75 TABLET ORAL DAILY
Status: DISCONTINUED | OUTPATIENT
Start: 2025-07-01 | End: 2025-07-05 | Stop reason: HOSPADM

## 2025-06-30 RX ORDER — POLYETHYLENE GLYCOL 3350 17 G/17G
17 POWDER, FOR SOLUTION ORAL DAILY PRN
Status: DISCONTINUED | OUTPATIENT
Start: 2025-06-30 | End: 2025-07-05 | Stop reason: HOSPADM

## 2025-06-30 RX ORDER — MAGNESIUM SULFATE IN WATER 40 MG/ML
2000 INJECTION, SOLUTION INTRAVENOUS PRN
Status: DISCONTINUED | OUTPATIENT
Start: 2025-06-30 | End: 2025-07-05 | Stop reason: HOSPADM

## 2025-06-30 RX ADMIN — HYDROMORPHONE HYDROCHLORIDE 0.5 MG: 1 INJECTION, SOLUTION INTRAMUSCULAR; INTRAVENOUS; SUBCUTANEOUS at 23:41

## 2025-06-30 ASSESSMENT — PAIN DESCRIPTION - ONSET: ONSET: ON-GOING

## 2025-06-30 ASSESSMENT — PAIN - FUNCTIONAL ASSESSMENT
PAIN_FUNCTIONAL_ASSESSMENT: ACTIVITIES ARE NOT PREVENTED
PAIN_FUNCTIONAL_ASSESSMENT: ACTIVITIES ARE NOT PREVENTED

## 2025-06-30 ASSESSMENT — ENCOUNTER SYMPTOMS
VOMITING: 1
BACK PAIN: 1
NAUSEA: 1

## 2025-06-30 ASSESSMENT — PAIN SCALES - GENERAL
PAINLEVEL_OUTOF10: 7
PAINLEVEL_OUTOF10: 6

## 2025-06-30 ASSESSMENT — PAIN DESCRIPTION - ORIENTATION
ORIENTATION: MID;POSTERIOR
ORIENTATION: MID

## 2025-06-30 ASSESSMENT — PAIN DESCRIPTION - DESCRIPTORS
DESCRIPTORS: DULL
DESCRIPTORS: DULL

## 2025-06-30 ASSESSMENT — PAIN DESCRIPTION - FREQUENCY: FREQUENCY: CONTINUOUS

## 2025-06-30 ASSESSMENT — PAIN DESCRIPTION - PAIN TYPE: TYPE: ACUTE PAIN

## 2025-06-30 ASSESSMENT — PAIN DESCRIPTION - LOCATION
LOCATION: NECK
LOCATION: NECK

## 2025-07-01 ENCOUNTER — APPOINTMENT (OUTPATIENT)
Age: 80
DRG: 281 | End: 2025-07-01
Attending: STUDENT IN AN ORGANIZED HEALTH CARE EDUCATION/TRAINING PROGRAM
Payer: MEDICARE

## 2025-07-01 ENCOUNTER — APPOINTMENT (OUTPATIENT)
Dept: VASCULAR LAB | Age: 80
DRG: 281 | End: 2025-07-01
Attending: PSYCHIATRY & NEUROLOGY
Payer: MEDICARE

## 2025-07-01 ENCOUNTER — APPOINTMENT (OUTPATIENT)
Dept: GENERAL RADIOLOGY | Age: 80
DRG: 281 | End: 2025-07-01
Attending: STUDENT IN AN ORGANIZED HEALTH CARE EDUCATION/TRAINING PROGRAM
Payer: MEDICARE

## 2025-07-01 ENCOUNTER — APPOINTMENT (OUTPATIENT)
Dept: MRI IMAGING | Age: 80
DRG: 281 | End: 2025-07-01
Attending: STUDENT IN AN ORGANIZED HEALTH CARE EDUCATION/TRAINING PROGRAM
Payer: MEDICARE

## 2025-07-01 LAB
ALBUMIN SERPL-MCNC: 2.7 G/DL (ref 3.5–5.2)
ALLENS TEST: ABNORMAL
ALP SERPL-CCNC: 129 U/L (ref 40–129)
ALT SERPL-CCNC: 28 U/L (ref 10–50)
ANION GAP SERPL CALCULATED.3IONS-SCNC: 16 MMOL/L (ref 8–16)
AST SERPL-CCNC: 67 U/L (ref 10–50)
BACTERIA URNS QL MICRO: NEGATIVE /HPF
BASE EXCESS ARTERIAL: -11.9 MMOL/L (ref -2–2)
BILIRUB SERPL-MCNC: 0.7 MG/DL (ref 0.2–1.2)
BILIRUB UR QL STRIP: NEGATIVE
BNP BLD-MCNC: 5642 PG/ML (ref 0–449)
BUN SERPL-MCNC: 20 MG/DL (ref 8–23)
CALCIUM SERPL-MCNC: 7.1 MG/DL (ref 8.8–10.2)
CARBOXYHEMOGLOBIN ARTERIAL: 1.3 % (ref 0–5)
CHLORIDE SERPL-SCNC: 112 MMOL/L (ref 98–107)
CK SERPL-CCNC: 398 U/L (ref 39–308)
CLARITY UR: ABNORMAL
CO2 SERPL-SCNC: 14 MMOL/L (ref 22–29)
COLOR UR: YELLOW
CREAT SERPL-MCNC: 1 MG/DL (ref 0.7–1.2)
CRYSTALS URNS MICRO: ABNORMAL /HPF
EKG P AXIS: NORMAL DEGREES
EKG P-R INTERVAL: NORMAL MS
EKG Q-T INTERVAL: 256 MS
EKG QRS DURATION: 102 MS
EKG QTC CALCULATION (BAZETT): 422 MS
EKG T AXIS: 71 DEGREES
EPI CELLS #/AREA URNS AUTO: 2 /HPF (ref 0–5)
ERYTHROCYTE [DISTWIDTH] IN BLOOD BY AUTOMATED COUNT: 13.7 % (ref 11.5–14.5)
FERRITIN SERPL-MCNC: 763 NG/ML (ref 30–400)
GLUCOSE BLD-MCNC: 95 MG/DL (ref 70–99)
GLUCOSE SERPL-MCNC: 93 MG/DL (ref 70–99)
GLUCOSE UR STRIP.AUTO-MCNC: NEGATIVE MG/DL
HCO3 ARTERIAL: 13.8 MMOL/L (ref 22–26)
HCT VFR BLD AUTO: 21.8 % (ref 42–52)
HCT VFR BLD AUTO: 22.8 % (ref 42–52)
HEMOGLOBIN, ART, EXTENDED: 9.8 G/DL (ref 14–18)
HGB BLD-MCNC: 7.4 G/DL (ref 14–18)
HGB BLD-MCNC: 7.4 G/DL (ref 14–18)
HGB UR STRIP.AUTO-MCNC: ABNORMAL MG/L
HYALINE CASTS #/AREA URNS AUTO: 9 /HPF (ref 0–8)
IRON SATN MFR SERPL: 9 % (ref 20–50)
IRON SERPL-MCNC: 12 UG/DL (ref 59–158)
KETONES UR STRIP.AUTO-MCNC: 15 MG/DL
LACTATE BLDV-SCNC: 1 MMOL/L (ref 0.5–1.9)
LEUKOCYTE ESTERASE UR QL STRIP.AUTO: ABNORMAL
MCH RBC QN AUTO: 30.1 PG (ref 27–31)
MCHC RBC AUTO-ENTMCNC: 32.5 G/DL (ref 33–37)
MCV RBC AUTO: 92.7 FL (ref 80–94)
METHEMOGLOBIN ARTERIAL: 0.7 %
MRSA DNA SPEC QL NAA+PROBE: NOT DETECTED
NITRITE UR QL STRIP.AUTO: NEGATIVE
O2 CONTENT ARTERIAL: 11.5 ML/DL
O2 DELIVERY DEVICE: ABNORMAL
O2 SAT, ARTERIAL: 83.3 %
O2 THERAPY: ABNORMAL
OXYGEN FLOW: 4
PCO2 ARTERIAL: 30 MMHG (ref 35–45)
PERFORMED ON: NORMAL
PH ARTERIAL: 7.27 (ref 7.35–7.45)
PH UR STRIP.AUTO: 6.5 [PH] (ref 5–8)
PLATELET # BLD AUTO: 242 K/UL (ref 130–400)
PMV BLD AUTO: 9.5 FL (ref 9.4–12.4)
PO2 ARTERIAL: 50 MMHG (ref 80–100)
POTASSIUM BLD-SCNC: 4.2 MMOL/L
POTASSIUM SERPL-SCNC: 3.8 MMOL/L (ref 3.5–5.1)
PROT SERPL-MCNC: 5.1 G/DL (ref 6.4–8.3)
PROT UR STRIP.AUTO-MCNC: 30 MG/DL
RBC # BLD AUTO: 2.46 M/UL (ref 4.7–6.1)
RBC #/AREA URNS AUTO: 4 /HPF (ref 0–4)
SAMPLE SOURCE: ABNORMAL
SODIUM SERPL-SCNC: 142 MMOL/L (ref 136–145)
SP GR UR STRIP.AUTO: 1.01 (ref 1–1.03)
T3FREE SERPL-MCNC: 1.3 PG/ML (ref 2–4.4)
T4 FREE SERPL-MCNC: 0.98 NG/DL (ref 0.93–1.7)
TIBC SERPL-MCNC: 141 UG/DL (ref 250–400)
TROPONIN, HIGH SENSITIVITY: 42 NG/L (ref 0–22)
TROPONIN, HIGH SENSITIVITY: 67 NG/L (ref 0–22)
TROPONIN, HIGH SENSITIVITY: 85 NG/L (ref 0–22)
UROBILINOGEN UR STRIP.AUTO-MCNC: 0.2 E.U./DL
WBC # BLD AUTO: 6.7 K/UL (ref 4.8–10.8)
WBC #/AREA URNS AUTO: 10 /HPF (ref 0–5)

## 2025-07-01 PROCEDURE — 83735 ASSAY OF MAGNESIUM: CPT

## 2025-07-01 PROCEDURE — 2500000003 HC RX 250 WO HCPCS: Performed by: NURSE PRACTITIONER

## 2025-07-01 PROCEDURE — 87086 URINE CULTURE/COLONY COUNT: CPT

## 2025-07-01 PROCEDURE — 87040 BLOOD CULTURE FOR BACTERIA: CPT

## 2025-07-01 PROCEDURE — 82962 GLUCOSE BLOOD TEST: CPT

## 2025-07-01 PROCEDURE — 84443 ASSAY THYROID STIM HORMONE: CPT

## 2025-07-01 PROCEDURE — 6370000000 HC RX 637 (ALT 250 FOR IP): Performed by: STUDENT IN AN ORGANIZED HEALTH CARE EDUCATION/TRAINING PROGRAM

## 2025-07-01 PROCEDURE — 2580000003 HC RX 258: Performed by: NURSE PRACTITIONER

## 2025-07-01 PROCEDURE — 84439 ASSAY OF FREE THYROXINE: CPT

## 2025-07-01 PROCEDURE — 94640 AIRWAY INHALATION TREATMENT: CPT

## 2025-07-01 PROCEDURE — 85018 HEMOGLOBIN: CPT

## 2025-07-01 PROCEDURE — 70553 MRI BRAIN STEM W/O & W/DYE: CPT

## 2025-07-01 PROCEDURE — 36600 WITHDRAWAL OF ARTERIAL BLOOD: CPT

## 2025-07-01 PROCEDURE — 83036 HEMOGLOBIN GLYCOSYLATED A1C: CPT

## 2025-07-01 PROCEDURE — 6370000000 HC RX 637 (ALT 250 FOR IP): Performed by: NURSE PRACTITIONER

## 2025-07-01 PROCEDURE — 93880 EXTRACRANIAL BILAT STUDY: CPT

## 2025-07-01 PROCEDURE — 94760 N-INVAS EAR/PLS OXIMETRY 1: CPT

## 2025-07-01 PROCEDURE — 95816 EEG AWAKE AND DROWSY: CPT

## 2025-07-01 PROCEDURE — 84484 ASSAY OF TROPONIN QUANT: CPT

## 2025-07-01 PROCEDURE — 83550 IRON BINDING TEST: CPT

## 2025-07-01 PROCEDURE — 2000000000 HC ICU R&B

## 2025-07-01 PROCEDURE — 4A10X4Z MONITORING OF CENTRAL NERVOUS ELECTRICAL ACTIVITY, EXTERNAL APPROACH: ICD-10-PCS | Performed by: PSYCHIATRY & NEUROLOGY

## 2025-07-01 PROCEDURE — 36415 COLL VENOUS BLD VENIPUNCTURE: CPT

## 2025-07-01 PROCEDURE — 87077 CULTURE AEROBIC IDENTIFY: CPT

## 2025-07-01 PROCEDURE — 83540 ASSAY OF IRON: CPT

## 2025-07-01 PROCEDURE — 6360000002 HC RX W HCPCS: Performed by: STUDENT IN AN ORGANIZED HEALTH CARE EDUCATION/TRAINING PROGRAM

## 2025-07-01 PROCEDURE — 82803 BLOOD GASES ANY COMBINATION: CPT

## 2025-07-01 PROCEDURE — 85027 COMPLETE CBC AUTOMATED: CPT

## 2025-07-01 PROCEDURE — 85014 HEMATOCRIT: CPT

## 2025-07-01 PROCEDURE — 6360000004 HC RX CONTRAST MEDICATION: Performed by: PSYCHIATRY & NEUROLOGY

## 2025-07-01 PROCEDURE — 82728 ASSAY OF FERRITIN: CPT

## 2025-07-01 PROCEDURE — A9577 INJ MULTIHANCE: HCPCS | Performed by: PSYCHIATRY & NEUROLOGY

## 2025-07-01 PROCEDURE — 83880 ASSAY OF NATRIURETIC PEPTIDE: CPT

## 2025-07-01 PROCEDURE — 2700000000 HC OXYGEN THERAPY PER DAY

## 2025-07-01 PROCEDURE — 6370000000 HC RX 637 (ALT 250 FOR IP): Performed by: INTERNAL MEDICINE

## 2025-07-01 PROCEDURE — 99223 1ST HOSP IP/OBS HIGH 75: CPT | Performed by: PSYCHIATRY & NEUROLOGY

## 2025-07-01 PROCEDURE — 87106 FUNGI IDENTIFICATION YEAST: CPT

## 2025-07-01 PROCEDURE — 95816 EEG AWAKE AND DROWSY: CPT | Performed by: PSYCHIATRY & NEUROLOGY

## 2025-07-01 PROCEDURE — 80053 COMPREHEN METABOLIC PANEL: CPT

## 2025-07-01 PROCEDURE — 6360000004 HC RX CONTRAST MEDICATION: Performed by: NURSE PRACTITIONER

## 2025-07-01 PROCEDURE — 84481 FREE ASSAY (FT-3): CPT

## 2025-07-01 PROCEDURE — 93005 ELECTROCARDIOGRAM TRACING: CPT

## 2025-07-01 PROCEDURE — 83605 ASSAY OF LACTIC ACID: CPT

## 2025-07-01 PROCEDURE — C8929 TTE W OR WO FOL WCON,DOPPLER: HCPCS

## 2025-07-01 PROCEDURE — 81001 URINALYSIS AUTO W/SCOPE: CPT

## 2025-07-01 PROCEDURE — 6360000002 HC RX W HCPCS: Performed by: INTERNAL MEDICINE

## 2025-07-01 PROCEDURE — 82550 ASSAY OF CK (CPK): CPT

## 2025-07-01 PROCEDURE — 99222 1ST HOSP IP/OBS MODERATE 55: CPT | Performed by: INTERNAL MEDICINE

## 2025-07-01 PROCEDURE — 6360000002 HC RX W HCPCS: Performed by: NURSE PRACTITIONER

## 2025-07-01 PROCEDURE — 71045 X-RAY EXAM CHEST 1 VIEW: CPT

## 2025-07-01 PROCEDURE — 87641 MR-STAPH DNA AMP PROBE: CPT

## 2025-07-01 RX ORDER — IPRATROPIUM BROMIDE AND ALBUTEROL SULFATE 2.5; .5 MG/3ML; MG/3ML
1 SOLUTION RESPIRATORY (INHALATION)
Status: DISCONTINUED | OUTPATIENT
Start: 2025-07-01 | End: 2025-07-05 | Stop reason: HOSPADM

## 2025-07-01 RX ORDER — SODIUM CHLORIDE, SODIUM LACTATE, POTASSIUM CHLORIDE, CALCIUM CHLORIDE 600; 310; 30; 20 MG/100ML; MG/100ML; MG/100ML; MG/100ML
INJECTION, SOLUTION INTRAVENOUS CONTINUOUS
Status: DISCONTINUED | OUTPATIENT
Start: 2025-07-01 | End: 2025-07-01

## 2025-07-01 RX ORDER — LACTOBACILLUS RHAMNOSUS GG 10B CELL
1 CAPSULE ORAL
Status: DISCONTINUED | OUTPATIENT
Start: 2025-07-02 | End: 2025-07-05 | Stop reason: HOSPADM

## 2025-07-01 RX ORDER — PROPRANOLOL HYDROCHLORIDE 10 MG/1
10 TABLET ORAL 2 TIMES DAILY
Status: DISCONTINUED | OUTPATIENT
Start: 2025-07-01 | End: 2025-07-02

## 2025-07-01 RX ORDER — FERROUS SULFATE 325(65) MG
325 TABLET ORAL 2 TIMES DAILY WITH MEALS
Status: DISCONTINUED | OUTPATIENT
Start: 2025-07-01 | End: 2025-07-02

## 2025-07-01 RX ADMIN — PAROXETINE HYDROCHLORIDE 20 MG: 10 TABLET, FILM COATED ORAL at 09:18

## 2025-07-01 RX ADMIN — PROPRANOLOL HYDROCHLORIDE 10 MG: 10 TABLET ORAL at 12:50

## 2025-07-01 RX ADMIN — IPRATROPIUM BROMIDE AND ALBUTEROL SULFATE 1 DOSE: 2.5; .5 SOLUTION RESPIRATORY (INHALATION) at 15:25

## 2025-07-01 RX ADMIN — ATORVASTATIN CALCIUM 20 MG: 20 TABLET, FILM COATED ORAL at 21:00

## 2025-07-01 RX ADMIN — GADOBENATE DIMEGLUMINE 15 ML: 529 INJECTION, SOLUTION INTRAVENOUS at 18:03

## 2025-07-01 RX ADMIN — DONEPEZIL HYDROCHLORIDE 10 MG: 10 TABLET ORAL at 00:05

## 2025-07-01 RX ADMIN — ATORVASTATIN CALCIUM 20 MG: 20 TABLET, FILM COATED ORAL at 00:05

## 2025-07-01 RX ADMIN — SODIUM CHLORIDE, PRESERVATIVE FREE 10 ML: 5 INJECTION INTRAVENOUS at 00:04

## 2025-07-01 RX ADMIN — IPRATROPIUM BROMIDE AND ALBUTEROL SULFATE 1 DOSE: 2.5; .5 SOLUTION RESPIRATORY (INHALATION) at 18:41

## 2025-07-01 RX ADMIN — CLOPIDOGREL BISULFATE 75 MG: 75 TABLET, FILM COATED ORAL at 09:18

## 2025-07-01 RX ADMIN — IPRATROPIUM BROMIDE AND ALBUTEROL SULFATE 1 DOSE: 2.5; .5 SOLUTION RESPIRATORY (INHALATION) at 02:07

## 2025-07-01 RX ADMIN — ENOXAPARIN SODIUM 40 MG: 100 INJECTION SUBCUTANEOUS at 12:51

## 2025-07-01 RX ADMIN — SODIUM CHLORIDE: 0.9 INJECTION, SOLUTION INTRAVENOUS at 00:06

## 2025-07-01 RX ADMIN — IPRATROPIUM BROMIDE AND ALBUTEROL SULFATE 1 DOSE: 2.5; .5 SOLUTION RESPIRATORY (INHALATION) at 11:20

## 2025-07-01 RX ADMIN — IRON SUCROSE 100 MG: 20 INJECTION, SOLUTION INTRAVENOUS at 12:51

## 2025-07-01 RX ADMIN — Medication 325 MG: at 21:00

## 2025-07-01 RX ADMIN — IPRATROPIUM BROMIDE AND ALBUTEROL SULFATE 1 DOSE: 2.5; .5 SOLUTION RESPIRATORY (INHALATION) at 07:36

## 2025-07-01 RX ADMIN — SODIUM CHLORIDE, PRESERVATIVE FREE 10 ML: 5 INJECTION INTRAVENOUS at 08:00

## 2025-07-01 RX ADMIN — IPRATROPIUM BROMIDE AND ALBUTEROL SULFATE 1 DOSE: 2.5; .5 SOLUTION RESPIRATORY (INHALATION) at 22:14

## 2025-07-01 RX ADMIN — PROPRANOLOL HYDROCHLORIDE 10 MG: 10 TABLET ORAL at 21:00

## 2025-07-01 RX ADMIN — HYDROMORPHONE HYDROCHLORIDE 0.5 MG: 1 INJECTION, SOLUTION INTRAMUSCULAR; INTRAVENOUS; SUBCUTANEOUS at 09:00

## 2025-07-01 RX ADMIN — CEFEPIME 2000 MG: 2 INJECTION, POWDER, FOR SOLUTION INTRAVENOUS at 00:08

## 2025-07-01 RX ADMIN — SULFUR HEXAFLUORIDE 2 ML: 60.7; .19; .19 INJECTION, POWDER, LYOPHILIZED, FOR SUSPENSION INTRAVENOUS; INTRAVESICAL at 16:30

## 2025-07-01 RX ADMIN — ASPIRIN 81 MG: 81 TABLET, COATED ORAL at 09:17

## 2025-07-01 RX ADMIN — VANCOMYCIN HYDROCHLORIDE 2000 MG: 5 INJECTION, POWDER, LYOPHILIZED, FOR SOLUTION INTRAVENOUS at 00:09

## 2025-07-01 ASSESSMENT — PAIN SCALES - GENERAL
PAINLEVEL_OUTOF10: 0

## 2025-07-01 NOTE — CARE COORDINATION
Sw spoke to patient about d/c plans and goals. Pt child at bedside. Pt hopes to return home once stable. Pt is not interested in hh at this time. Pt does not report any DME in use. No other questions or concerns.  Electronically signed by LI REAL on 7/1/2025 at 12:46 PM

## 2025-07-01 NOTE — PROGRESS NOTES
Pharmacy Renal Adjustment    Fernando Chu is a 80 y.o. male. Pharmacy has renally adjusted medications per protocol.    Recent Labs     06/30/25  2301   BUN 24*       Recent Labs     06/30/25  2301   CREATININE 1.2       Estimated Creatinine Clearance: 48 mL/min (based on SCr of 1.2 mg/dL).    Height:   Ht Readings from Last 1 Encounters:   06/30/25 1.753 m (5' 9\")     Weight:  Wt Readings from Last 1 Encounters:   06/30/25 75.8 kg (167 lb 3.2 oz)       Plan: Adjust the following medications based on renal function:           Cefepime to 2000 mg IV every 12 hours extended infusion over 240 minutes     Electronically signed by TOSHIA AVALOS RPH on 7/1/2025 at 2:11 AM

## 2025-07-01 NOTE — H&P
[facility]      Hospitalist - History & Physical      PCP: Misty Blas APRN    Date of Admission: 6/30/2025    Date of Service: 6/30/2025    Chief Complaint: Sepsis, syncope and NSTEMI    History Of Present Illness:   The patient is a 80 y.o. male who presented to [facility] as a transfer from [facility] for cardiology consultation for evaluation of syncope associated with elevated troponin. Pt has history of colon malignancy, CAD prior stent placement, hypertension, hyperlipidemia and anemia.     Pt relates that he experienced fainting episode while in bathroom of his house sometime early this morning. He relates that he was in the floor he estimates 5-6hrs unable to get himself up eventually scooting to his living room where he was able to call EMS. Pt reports fevers and chills today. He was given levaquin at [facility] hospital due to concern for sepsis. Pt had elevated troponin without associated chest pain or shortness of breath. He denies shortness of breath, cough as well as abdominal pain. Pt is admitted inpatient to hospitalist.     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 has been removed       Past Surgical History:        Procedure Laterality Date    CARDIAC PROCEDURE N/A 10/10/2024    Left heart cath / coronary angiography performed by Ronan Carr MD at [facility] CARDIAC CATH LAB    CARDIAC PROCEDURE N/A 10/10/2024    Percutaneous coronary intervention performed by Ronan Carr MD at [facility] CARDIAC CATH LAB    CARDIAC PROCEDURE N/A 09/19/2024    Left heart cath / coronary angiography performed by Ronan Carr MD at [facility] CARDIAC CATH LAB    CARDIAC PROCEDURE N/A 09/19/2024    Percutaneous coronary intervention

## 2025-07-01 NOTE — PROGRESS NOTES
Vascular lab preliminary.  Bilateral carotid duplex scan completed.  B/L ICA's <50% stenosis.  B/l Vertebrals antegrade flow.  Final report pending.

## 2025-07-01 NOTE — FLOWSHEET NOTE
07/01/25 1422 07/01/25 1423 07/01/25 1425   Vitals   Pulse 74 70 78   Respirations 27 15 18   /64 97/60 (!) 92/59   MAP (Calculated) 77 72 70   MAP (mmHg) 72 67 66   BP Location Right upper arm Right upper arm Right upper arm   BP Upper/Lower Upper Upper Upper   BP Method Automatic Automatic Automatic   Patient Position Supine  (HOB 20) Sitting Standing   Oxygen Therapy   SpO2 96 % 98 % 100 %

## 2025-07-01 NOTE — PROGRESS NOTES
Spiritual Health History and Assessment/Progress Note  [facility]    Initial Encounter,  ,  ,      Name: Fernando Chu MRN: [id number]    Age: 80 y.o.     Sex: male   Language: English   Quaker: Scientologist   Syncope and collapse     Date: 7/1/2025            Total Time Calculated: 30 min              Spiritual Assessment began in [facility] ICU            Encounter Overview/Reason: Initial Encounter  Service Provided For: Patient and family together (with son at bedside in ICU)    Cherelle, Belief, Meaning:   Patient identifies as spiritual  Family/Friends identify as spiritual      Importance and Influence:  Patient has spiritual/personal beliefs that influence decisions regarding their health  Family/Friends Other:      Community:  Patient is connected with a spiritual community and feels well-supported. 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 His cherelle in God helped him coping with illness. His wife a 57 years was having memory issues. His children are very supportive.   Patient Interventions include: Facilitated expression of thoughts and feelings, Affirmed coping skills/support systems, and Assisted in Advance Care Planning conversation, encourage the family to have or provided to a copy of LW.   Family/Friends Interventions include: Affirmed coping skills/support systems    Patient Plan of Care: Spiritual Care available upon further referral  Family/Friends Plan of Care: No family/friends present    Electronically signed by Chaplain Villarreal Mai on 7/1/2025 at 3:01 PM

## 2025-07-01 NOTE — PROGRESS NOTES
4 Eyes Skin Assessment     NAME:  Fernando Chu  YOB: 1945  MEDICAL RECORD NUMBER:  [id number]    The patient is being assessed for  Admission    I agree that at least one RN has performed a thorough Head to Toe Skin Assessment on the patient. ALL assessment sites listed below have been assessed.      Areas assessed by both nurses:    Head, Face, Ears, Shoulders, Back, Chest, Arms, Elbows, Hands, Sacrum. Buttock, Coccyx, Ischium, and Legs. Feet and Heels        Does the Patient have a Wound? No noted wound(s)       Blake Prevention initiated by RN: Yes  Wound Care Orders initiated by RN: No    Pressure Injury (Stage 1,2,3,4, Unstageable, DTI, NWPT, and Complex wounds) if present, place Wound referral order by RN under : No    New Ostomies, if present place, Ostomy referral order under : No     Nurse 1 eSignature: Electronically signed by Sammie Barr RN on 7/1/25 at 12:17 AM CDT    **SHARE this note so that the co-signing nurse can place an eSignature**    Nurse 2 eSignature: Electronically signed by Lois Denny RN on 7/1/25 at 12:18 AM CDT

## 2025-07-01 NOTE — PROGRESS NOTES
Patient experienced new onset shortness of breath and reported feeling \"shaky\" and \"very cold\". Oxygen saturation noted at 88% room air. Rapid response activated. Blood pressure unable to be obtained due to patient's inability to remain still. Temperature 97.9 oral. Heart rate 120, respirations 22. Blood glucose 95. Hospitalist Dr. Espinoza at bedside. Patient placed on 4L nasal cannula. STAT orders placed for labs, chest x-ray, ABG, and EKG. O2 nasal cannula increased to 6L. Patient admitted to ICU, room 148 for higher level of care. Attempt made by this nurse to contact patient's son, Jonn Chu, but unable to reach at this time.

## 2025-07-01 NOTE — PROGRESS NOTES
Contaced [facility] and updated home med list.    Electronically signed by Amy Wheeler RN on 7/1/2025 at 10:25 AM

## 2025-07-01 NOTE — PROGRESS NOTES
Medicine Progress Note  [facility]     Patient: Fernando Chu  : 1945  MRN: [id number]  Code Status: Full Code    Hospital Day: 1   Date of Service: 2025    Subjective:   Patient seen and examined.  Sitting in chair.  No current complaints.  Son at bedside.  All questions sought and answered.    Past Medical History:   Diagnosis Date    Cancer (HCC) 2011    colon    GERD (gastroesophageal reflux disease)     Hyperlipidemia     Hypertension     Iron deficiency anemia secondary to inadequate dietary iron intake 2024    Iron malabsorption 2024    Pancreatitis     Port-A-Cath in place     Mediport. has been removed       Past Surgical History:   Procedure Laterality Date    CARDIAC PROCEDURE N/A 10/10/2024    Left heart cath / coronary angiography performed by Ronan Carr MD at [facility] CARDIAC CATH LAB    CARDIAC PROCEDURE N/A 10/10/2024    Percutaneous coronary intervention performed by Ronan Carr MD at [facility] CARDIAC CATH LAB    CARDIAC PROCEDURE N/A 2024    Left heart cath / coronary angiography performed by Ronan Carr MD at [facility] CARDIAC CATH LAB    CARDIAC PROCEDURE N/A 2024    Percutaneous coronary intervention performed by Ronan Carr MD at [facility] CARDIAC CATH LAB    COLON SURGERY  2010    resection CA    COLONOSCOPY  2013    Dr. Odonnell    COLONOSCOPY  2016    Dr. Odonnell    FRACTURE SURGERY  1990    left foot    HERNIA REPAIR  11/15/2004    Dr. Valdez    KIDNEY STONE SURGERY      OTHER SURGICAL HISTORY      umbilical surgery when he was 11    OTHER SURGICAL HISTORY      SBO       Family History   Problem Relation Age of Onset    Cancer Mother         breast    Heart Disease Father     Heart Disease Brother        Social History     Socioeconomic History    Marital status:      Spouse name: Not on file    Number of children: Not on file    Years of education: Not on file    Highest education level: Not on file   Occupational History     Not on file   Tobacco Use    Smoking status: Former     Current packs/day: 0.00     Types: Cigarettes     Quit date: 1990     Years since quittin.5    Smokeless tobacco: Never   Vaping Use    Vaping status: Never Used   Substance and Sexual Activity    Alcohol use: Not Currently     Alcohol/week: 1.0 standard drink of alcohol     Types: 1 Glasses of wine per week    Drug use: Never    Sexual activity: Defer   Other Topics Concern    Not on file   Social History Narrative    Not on file     Social Drivers of Health     Financial Resource Strain: Low Risk  (3/8/2024)    Overall Financial Resource Strain (CARDIA)     Difficulty of Paying Living Expenses: Not hard at all   Food Insecurity: No Food Insecurity (2025)    Hunger Vital Sign     Worried About Running Out of Food in the Last Year: Never true     Ran Out of Food in the Last Year: Never true   Transportation Needs: No Transportation Needs (2025)    PRAPARE - Transportation     Lack of Transportation (Medical): No     Lack of Transportation (Non-Medical): No   Physical Activity: Insufficiently Active (4/15/2025)    Exercise Vital Sign     Days of Exercise per Week: 2 days     Minutes of Exercise per Session: 20 min   Stress: Not on file   Social Connections: Unknown (10/11/2023)    Received from [facility], [facility]    Family and Community Support     Help with Day-to-Day Activities: Not on file     Lonely or Isolated: Not on file   Intimate Partner Violence: Not At Risk (2025)    Received from [facility]    Abuse Screen     Feels Unsafe at Home or Work/School: no     Feels Threatened by Someone: no     Does Anyone Try to Keep You From Having Contact with Others or Doing Things Outside Your Home?: no     Physical Signs of Abuse Present: no   Housing Stability: Low Risk  (2025)    Housing Stability Vital Sign     Unable to Pay for Housing in the Last Year: No     Number of Times Moved in

## 2025-07-01 NOTE — FLOWSHEET NOTE
ORTHOSTATIC BPS     07/01/25 1421 07/01/25 1422 07/01/25 1423   Vitals   Pulse 63 74 70   Respirations 14 27 15   /64  --  97/60   MAP (Calculated) 77  --  72    MAP (mmHg) 72  --  67   BP Location Right upper arm  --  Right upper arm   BP Upper/Lower Upper  --  Upper   BP Method Automatic  --  Automatic   Patient Position Supine  (HOB 20)  --  Sitting   Oxygen Therapy   SpO2 99 % 96 % 98 %      07/01/25 1425   Vitals   Pulse 78   Respirations 18   BP (!) 92/59   MAP (Calculated) 70   MAP (mmHg) 66   BP Location Right upper arm   BP Upper/Lower Upper   BP Method Automatic   Patient Position Standing   Oxygen Therapy   SpO2 100 %

## 2025-07-01 NOTE — PROGRESS NOTES
Pt is direct admit from [facility] arrived to room 404. Pt is A&O x4. VSS. Tele box MX 67 placed on pt. Oriented pt to room and call light within reach. Bed alarm on.

## 2025-07-01 NOTE — PROGRESS NOTES
[facility]   Pharmacy Pharmacokinetic Monitoring Service - Vancomycin     Fernando Chu is a 80 y.o. male starting on vancomycin therapy for sepsis. Pharmacy consulted by Quintin Cantu for monitoring and adjustment.    Target Concentration: Trough 15-20 mg/L, switch to -600 once renal function stable    Additional Antimicrobials: Cefepime     Pertinent Laboratory Values:   Wt Readings from Last 1 Encounters:   06/30/25 75.8 kg (167 lb 3.2 oz)     Temp Readings from Last 1 Encounters:   06/30/25 98.8 °F (37.1 °C) (Oral)     Estimated Creatinine Clearance: 48 mL/min (based on SCr of 1.2 mg/dL).  Recent Labs     06/30/25  2301   CREATININE 1.2   BUN 24*     Procalcitonin: None    Pertinent Cultures:  Culture Date Source Results   6/30/25 Blood x2 Sent   MRSA Nasal Swab: not ordered. Order placed by pharmacy.    Plan:  Concentration-guided dosing due to renal impairment/insufficiency  Start vancomycin 2000 mg IV loading dose once, followed by pulse dosing   Renal labs as indicated   Vancomycin concentration ordered for 7/1 @ 2300   Pharmacy will continue to monitor patient and adjust therapy as indicated    Thank you for the consult,  TOSHIA AVALOS RPH  6/30/2025 11:33 PM

## 2025-07-01 NOTE — CONSULTS
[facility] Consult      Patient:   Fernando Chu  MR#:    [id number]  Account Number:                   [id number]      Room:    0148/148-01   YOB: 1945  Date of Progress Note: 7/1/2025  Time of Note                           12:28 PM  Attending Physician:  Lowell Taylor MD  Consulting Physician:  Jhonny Garza DO       CHIEF COMPLAINT:  Syncope, tremulousness      HISTORY OF PRESENT ILLNESS:   This is a 80 y.o. male who was admitted with syncope and episodes of tremulousness.  Transferred here from [facility] for a syncopal event and elevated troponin.  Is a history of colon cancer, coronary artery disease, hypertension, hyperlipidemia and anemia.  He states he had a sudden episode of loss of awareness while in the bathroom.  He sat on the floor for 5 to 6 hours and was unable to get up.  He reported some possible fever and chills.  The event was brief.  No associated chest pain or heart palpitations.  No urinary incontinence or tongue biting.  No clear generalized tonic-clonic activity witnessed.  No focal symptoms including facial drooping, focal weakness or numbness, slurred speech was noted.  Currently he seems improved.  He is noting intermittent episodes of generalized tremulousness without loss of awareness.  No further syncopal events noted.    REVIEW OF SYSTEMS:  Constitutional - No fever or chills.    HENT -  No Scalp tenderness.  No tinnitus or significant hearing loss.  No nose bleeding, no sore throat.  Eyes - No sudden vision change or eye pain  Respiratory - No significant shortness of breath or cough  Cardiovascular - No chest pain. No palpitations or significant leg swelling  Gastrointestinal - No abdominal swelling or pain.    Genitourinary - No difficulty urinating, dysuria  Musculoskeletal - No back pain or myalgia.  Skin - No color change or rash  Neurologic - No lateralizing weakness.  No numbness.  Hematologic - No easy bruising or spontaneous  appears unremarkable  [x] Speech normal without dysarthria or aphasia, comprehension and repetition intact.   COMMENTS:   Cranial Nerves [x] No VF deficit to confrontation,  optic discs normal, no papilledema on fundoscopic exam.  [x] PERRLA, EOMI, no nystagmus, conjugate eye movements, no ptosis  [x] Face symmetric  [x] Facial sensation intact  [x] Tongue midline no atrophy or fasciculations present  [x] Palate midline, hearing to finger rub normal  [x] Shoulder shrug and SCM testing normal  COMMENTS:   Motor   [x] 5/5 strength x 4 extremities  [x] Normal bulk and tone  [] No tremor present  [] No rigidity or bradykinesia noted  COMMENTS:Mixed tremor with resting and intention components, mild rigidity, bradykinesia noted    Sensory  [] Sensation intact to light touch, pin prick, vibration, and proprioception BLE  [] Sensation intact to light touch, pin prick, vibration, and proprioception BUE  COMMENTS: Limited    Coordination [x] FTN normal bilaterally   [] HTS normal bilaterally  [] ABDI normal.   COMMENTS:   Reflexes  [x] Symmetric and non-pathological  [x] Toes downgoing bilaterally  [x] No clonus present  COMMENTS:   Gait                  [] Normal steady gait    [] Ataxic    [] Spastic     [] Magnetic     [] Shuffling  [x] Not assessed  COMMENTS:       LABS/IMAGING:    As below and per HPI    XR CHEST PORTABLE  Result Date: 7/1/2025  EXAM:  FRONTAL VIEW OF THE CHEST.  HISTORY:  New onset shortness of breath.  COMPARISON:  None.  FINDINGS: Aortic calcifications.  Normal heart size.  No acute consolidation.  No visible pleural effusion or pneumothorax.  No acute osseous abnormality. Degenerative changes of the spine and shoulders. Bones appear demineralized.       No acute finding in the chest.     ______________________________________ Electronically signed by: JORDANA DOUGLASS M.D. Date:     07/01/2025 Time:    02:38       Recent Labs     07/01/25  0137   WBC 6.7   HGB 7.4*        Recent Labs

## 2025-07-02 LAB
A BAUMANNII DNA BLD POS QL NAA+NON-PROBE: NOT DETECTED
ANION GAP SERPL CALCULATED.3IONS-SCNC: 9 MMOL/L (ref 8–16)
BASOPHILS # BLD: 0 K/UL (ref 0–0.2)
BASOPHILS NFR BLD: 0.2 % (ref 0–1)
BUN SERPL-MCNC: 24 MG/DL (ref 8–23)
C ALBICANS DNA BLD POS QL NAA+NON-PROBE: NOT DETECTED
C AURIS DNA BLD POS QL NAA+PROBE: NOT DETECTED
C GLABRATA DNA BLD POS QL NAA+NON-PROBE: NOT DETECTED
C KRUSEI DNA BLD POS QL NAA+NON-PROBE: NOT DETECTED
C PARAP DNA BLD POS QL NAA+NON-PROBE: NOT DETECTED
C TROPICLS DNA BLD POS QL NAA+NON-PROBE: NOT DETECTED
CALCIUM SERPL-MCNC: 8.2 MG/DL (ref 8.8–10.2)
CHLORIDE SERPL-SCNC: 109 MMOL/L (ref 98–107)
CK SERPL-CCNC: 186 U/L (ref 39–308)
CO2 SERPL-SCNC: 21 MMOL/L (ref 22–29)
CREAT SERPL-MCNC: 1 MG/DL (ref 0.7–1.2)
CRYPTOCOCCUS NEOFORMANS/GATTII BY PCR: NOT DETECTED
E CLOAC COMP DNA BLD POS NAA+NON-PROBE: NOT DETECTED
E COLI DNA BLD POS QL NAA+NON-PROBE: NOT DETECTED
E FAECALIS DNA BLD POS QL NAA+PROBE: NOT DETECTED
E FAECIUM DNA BLD POS QL NAA+PROBE: NOT DETECTED
ECHO AO ASC DIAM: 3.3 CM
ECHO AO ASCENDING AORTA INDEX: 1.73 CM/M2
ECHO AO ROOT DIAM: 2.8 CM
ECHO AO ROOT INDEX: 1.47 CM/M2
ECHO AO SINUS VALSALVA DIAM: 2.9 CM
ECHO AO SINUS VALSALVA INDEX: 1.52 CM/M2
ECHO AO ST JNCT DIAM: 2.7 CM
ECHO AR MAX VEL PISA: 2.8 M/S
ECHO AV AREA PEAK VELOCITY: 2.2 CM2
ECHO AV AREA VTI: 2 CM2
ECHO AV AREA/BSA PEAK VELOCITY: 1.2 CM2/M2
ECHO AV AREA/BSA VTI: 1 CM2/M2
ECHO AV MEAN GRADIENT: 5 MMHG
ECHO AV MEAN VELOCITY: 1 M/S
ECHO AV PEAK GRADIENT: 9 MMHG
ECHO AV PEAK VELOCITY: 1.5 M/S
ECHO AV REGURGITANT PHT: 874 MS
ECHO AV VELOCITY RATIO: 0.67
ECHO AV VTI: 34.9 CM
ECHO BSA: 1.92 M2
ECHO EST RA PRESSURE: 8 MMHG
ECHO IVC PROX: 2.1 CM
ECHO LA AREA 2C: 25.8 CM2
ECHO LA AREA 4C: 22.6 CM2
ECHO LA DIAMETER INDEX: 2.25 CM/M2
ECHO LA DIAMETER: 4.3 CM
ECHO LA MAJOR AXIS: 5.3 CM
ECHO LA MINOR AXIS: 5.6 CM
ECHO LA TO AORTIC ROOT RATIO: 1.54
ECHO LA VOL BP: 89 ML (ref 18–58)
ECHO LA VOL MOD A2C: 99 ML (ref 18–58)
ECHO LA VOL MOD A4C: 79 ML (ref 18–58)
ECHO LA VOL/BSA BIPLANE: 47 ML/M2 (ref 16–34)
ECHO LA VOLUME INDEX MOD A2C: 52 ML/M2 (ref 16–34)
ECHO LA VOLUME INDEX MOD A4C: 41 ML/M2 (ref 16–34)
ECHO LV E' LATERAL VELOCITY: 8.59 CM/S
ECHO LV E' SEPTAL VELOCITY: 5.98 CM/S
ECHO LV EDV A2C: 183 ML
ECHO LV EDV A4C: 165 ML
ECHO LV EDV INDEX A4C: 86 ML/M2
ECHO LV EDV NDEX A2C: 96 ML/M2
ECHO LV EF PHYSICIAN: 40 %
ECHO LV EJECTION FRACTION A2C: 51 %
ECHO LV EJECTION FRACTION A4C: 40 %
ECHO LV EJECTION FRACTION BIPLANE: 43 % (ref 55–100)
ECHO LV ESV A2C: 90 ML
ECHO LV ESV A4C: 99 ML
ECHO LV ESV INDEX A2C: 47 ML/M2
ECHO LV ESV INDEX A4C: 52 ML/M2
ECHO LV FRACTIONAL SHORTENING: 29 % (ref 28–44)
ECHO LV INTERNAL DIMENSION DIASTOLE INDEX: 2.67 CM/M2
ECHO LV INTERNAL DIMENSION DIASTOLIC: 5.1 CM (ref 4.2–5.9)
ECHO LV INTERNAL DIMENSION SYSTOLIC INDEX: 1.88 CM/M2
ECHO LV INTERNAL DIMENSION SYSTOLIC: 3.6 CM
ECHO LV IVSD: 1.4 CM (ref 0.6–1)
ECHO LV MASS 2D: 300.4 G (ref 88–224)
ECHO LV MASS INDEX 2D: 157.3 G/M2 (ref 49–115)
ECHO LV POSTERIOR WALL DIASTOLIC: 1.4 CM (ref 0.6–1)
ECHO LV RELATIVE WALL THICKNESS RATIO: 0.55
ECHO LVOT AREA: 3.5 CM2
ECHO LVOT AV VTI INDEX: 0.58
ECHO LVOT DIAM: 2.1 CM
ECHO LVOT MEAN GRADIENT: 2 MMHG
ECHO LVOT PEAK GRADIENT: 4 MMHG
ECHO LVOT PEAK VELOCITY: 1 M/S
ECHO LVOT STROKE VOLUME INDEX: 36.4 ML/M2
ECHO LVOT SV: 69.6 ML
ECHO LVOT VTI: 20.1 CM
ECHO MV A VELOCITY: 0.67 M/S
ECHO MV AREA VTI: 1.7 CM2
ECHO MV E DECELERATION TIME (DT): 289 MS
ECHO MV E VELOCITY: 0.83 M/S
ECHO MV E/A RATIO: 1.24
ECHO MV E/E' LATERAL: 9.66
ECHO MV E/E' RATIO (AVERAGED): 11.77
ECHO MV E/E' SEPTAL: 13.88
ECHO MV LVOT VTI INDEX: 2.03
ECHO MV MAX VELOCITY: 1 M/S
ECHO MV MEAN GRADIENT: 2 MMHG
ECHO MV MEAN VELOCITY: 0.6 M/S
ECHO MV PEAK GRADIENT: 4 MMHG
ECHO MV VTI: 40.9 CM
ECHO PULMONARY ARTERY END DIASTOLIC PRESSURE: 3 MMHG
ECHO PV REGURGITANT MAX VELOCITY: 0.9 M/S
ECHO RA AREA 4C: 14.7 CM2
ECHO RA END SYSTOLIC VOLUME APICAL 4 CHAMBER INDEX BSA: 19 ML/M2
ECHO RA VOLUME: 37 ML
ECHO RIGHT VENTRICULAR SYSTOLIC PRESSURE (RVSP): 34 MMHG
ECHO RV BASAL DIMENSION: 4.1 CM
ECHO RV INTERNAL DIMENSION: 3.4 CM
ECHO RV LONGITUDINAL DIMENSION: 7.7 CM
ECHO RV MID DIMENSION: 4.7 CM
ECHO RV TAPSE: 1.8 CM (ref 1.7–?)
ECHO TV REGURGITANT MAX VELOCITY: 2.56 M/S
ECHO TV REGURGITANT PEAK GRADIENT: 26 MMHG
ENTEROBACT DNA BLD POS QL NAA+NON-PROBE: NOT DETECTED
ENTEROCOC DNA BLD POS QL NAA+NON-PROBE: NOT DETECTED
EOSINOPHIL # BLD: 0.1 K/UL (ref 0–0.6)
EOSINOPHIL NFR BLD: 0.9 % (ref 0–5)
ERYTHROCYTE [DISTWIDTH] IN BLOOD BY AUTOMATED COUNT: 13.8 % (ref 11.5–14.5)
GLUCOSE SERPL-MCNC: 89 MG/DL (ref 70–99)
GN BLD CULTURE PNL BLD POS NAA+PROBE: NOT DETECTED
GP B STREP DNA BLD POS QL NAA+NON-PROBE: NOT DETECTED
HCT VFR BLD AUTO: 22.9 % (ref 42–52)
HGB BLD-MCNC: 7.4 G/DL (ref 14–18)
IMM GRANULOCYTES # BLD: 0 K/UL
K OXYTOCA DNA BLD POS QL NAA+NON-PROBE: NOT DETECTED
K PNEUMON DNA SPEC QL NAA+PROBE: NOT DETECTED
K. AEROGENES DNA SPEC QL NAA+PROBE: NOT DETECTED
L MONOCYTOG DNA BLD POS QL NAA+NON-PROBE: NOT DETECTED
LYMPHOCYTES # BLD: 0.8 K/UL (ref 1.1–4.5)
LYMPHOCYTES NFR BLD: 12.2 % (ref 20–40)
MAGNESIUM SERPL-MCNC: 2.1 MG/DL (ref 1.6–2.4)
MCH RBC QN AUTO: 29 PG (ref 27–31)
MCHC RBC AUTO-ENTMCNC: 32.3 G/DL (ref 33–37)
MCV RBC AUTO: 89.8 FL (ref 80–94)
MONOCYTES # BLD: 0.4 K/UL (ref 0–0.9)
MONOCYTES NFR BLD: 6.5 % (ref 0–10)
N MEN DNA BLD POS QL NAA+NON-PROBE: NOT DETECTED
NEUTROPHILS # BLD: 5.3 K/UL (ref 1.5–7.5)
NEUTS SEG NFR BLD: 79.9 % (ref 50–65)
P AERUGINOSA DNA BLD POS NAA+NON-PROBE: NOT DETECTED
PLATELET # BLD AUTO: 201 K/UL (ref 130–400)
PMV BLD AUTO: 9.6 FL (ref 9.4–12.4)
POTASSIUM SERPL-SCNC: 3.8 MMOL/L (ref 3.5–5.1)
PROTEUS SP DNA BLD POS QL NAA+NON-PROBE: NOT DETECTED
RBC # BLD AUTO: 2.55 M/UL (ref 4.7–6.1)
S AUREUS DNA BLD POS QL NAA+NON-PROBE: NOT DETECTED
S AUREUS+CONS DNA BLD POS NAA+NON-PROBE: NOT DETECTED
S EPIDERMIDIS DNA BLD POS QL NAA+PROBE: NOT DETECTED
S LUGDUNENSIS DNA BLD POS QL NAA+PROBE: NOT DETECTED
S MALTOPH DNA BLD POS QL NAA+PROBE: NOT DETECTED
S MARCESCENS DNA BLD POS NAA+NON-PROBE: NOT DETECTED
S PNEUM DNA BLD POS QL NAA+NON-PROBE: NOT DETECTED
S PYO DNA BLD POS QL NAA+NON-PROBE: NOT DETECTED
SALMONELLA DNA BLD POS QL NAA+PROBE: NOT DETECTED
SODIUM SERPL-SCNC: 139 MMOL/L (ref 136–145)
STREPTOCOCCUS DNA BLD POS NAA+NON-PROBE: DETECTED
VANCOMYCIN SERPL-MCNC: 5.7 UG/ML
WBC # BLD AUTO: 6.7 K/UL (ref 4.8–10.8)

## 2025-07-02 PROCEDURE — 6370000000 HC RX 637 (ALT 250 FOR IP): Performed by: INTERNAL MEDICINE

## 2025-07-02 PROCEDURE — 94150 VITAL CAPACITY TEST: CPT

## 2025-07-02 PROCEDURE — 80048 BASIC METABOLIC PNL TOTAL CA: CPT

## 2025-07-02 PROCEDURE — 6370000000 HC RX 637 (ALT 250 FOR IP): Performed by: NURSE PRACTITIONER

## 2025-07-02 PROCEDURE — 94640 AIRWAY INHALATION TREATMENT: CPT

## 2025-07-02 PROCEDURE — 1200000000 HC SEMI PRIVATE

## 2025-07-02 PROCEDURE — 6360000002 HC RX W HCPCS: Performed by: INTERNAL MEDICINE

## 2025-07-02 PROCEDURE — 85025 COMPLETE CBC W/AUTO DIFF WBC: CPT

## 2025-07-02 PROCEDURE — 2700000000 HC OXYGEN THERAPY PER DAY

## 2025-07-02 PROCEDURE — 6370000000 HC RX 637 (ALT 250 FOR IP): Performed by: STUDENT IN AN ORGANIZED HEALTH CARE EDUCATION/TRAINING PROGRAM

## 2025-07-02 PROCEDURE — 93306 TTE W/DOPPLER COMPLETE: CPT | Performed by: INTERNAL MEDICINE

## 2025-07-02 PROCEDURE — 83735 ASSAY OF MAGNESIUM: CPT

## 2025-07-02 PROCEDURE — 6360000002 HC RX W HCPCS: Performed by: NURSE PRACTITIONER

## 2025-07-02 PROCEDURE — 82550 ASSAY OF CK (CPK): CPT

## 2025-07-02 PROCEDURE — 94760 N-INVAS EAR/PLS OXIMETRY 1: CPT

## 2025-07-02 PROCEDURE — 2580000003 HC RX 258: Performed by: INTERNAL MEDICINE

## 2025-07-02 PROCEDURE — 99232 SBSQ HOSP IP/OBS MODERATE 35: CPT | Performed by: INTERNAL MEDICINE

## 2025-07-02 PROCEDURE — 99232 SBSQ HOSP IP/OBS MODERATE 35: CPT | Performed by: PSYCHIATRY & NEUROLOGY

## 2025-07-02 PROCEDURE — 36415 COLL VENOUS BLD VENIPUNCTURE: CPT

## 2025-07-02 PROCEDURE — 80202 ASSAY OF VANCOMYCIN: CPT

## 2025-07-02 RX ORDER — CARVEDILOL 6.25 MG/1
6.25 TABLET ORAL 2 TIMES DAILY WITH MEALS
Status: DISCONTINUED | OUTPATIENT
Start: 2025-07-02 | End: 2025-07-05 | Stop reason: HOSPADM

## 2025-07-02 RX ADMIN — IPRATROPIUM BROMIDE AND ALBUTEROL SULFATE 1 DOSE: 2.5; .5 SOLUTION RESPIRATORY (INHALATION) at 02:10

## 2025-07-02 RX ADMIN — ASPIRIN 81 MG: 81 TABLET, COATED ORAL at 07:40

## 2025-07-02 RX ADMIN — Medication 1 CAPSULE: at 07:40

## 2025-07-02 RX ADMIN — PANTOPRAZOLE SODIUM 40 MG: 40 TABLET, DELAYED RELEASE ORAL at 07:40

## 2025-07-02 RX ADMIN — IPRATROPIUM BROMIDE AND ALBUTEROL SULFATE 1 DOSE: 2.5; .5 SOLUTION RESPIRATORY (INHALATION) at 06:17

## 2025-07-02 RX ADMIN — IPRATROPIUM BROMIDE AND ALBUTEROL SULFATE 1 DOSE: 2.5; .5 SOLUTION RESPIRATORY (INHALATION) at 10:09

## 2025-07-02 RX ADMIN — ENOXAPARIN SODIUM 40 MG: 100 INJECTION SUBCUTANEOUS at 07:40

## 2025-07-02 RX ADMIN — IPRATROPIUM BROMIDE AND ALBUTEROL SULFATE 1 DOSE: 2.5; .5 SOLUTION RESPIRATORY (INHALATION) at 22:31

## 2025-07-02 RX ADMIN — Medication 325 MG: at 07:40

## 2025-07-02 RX ADMIN — CLOPIDOGREL BISULFATE 75 MG: 75 TABLET, FILM COATED ORAL at 07:40

## 2025-07-02 RX ADMIN — EPOETIN ALFA-EPBX 10000 UNITS: 10000 INJECTION, SOLUTION INTRAVENOUS; SUBCUTANEOUS at 17:57

## 2025-07-02 RX ADMIN — IPRATROPIUM BROMIDE AND ALBUTEROL SULFATE 1 DOSE: 2.5; .5 SOLUTION RESPIRATORY (INHALATION) at 18:33

## 2025-07-02 RX ADMIN — PAROXETINE HYDROCHLORIDE 20 MG: 10 TABLET, FILM COATED ORAL at 07:40

## 2025-07-02 RX ADMIN — VANCOMYCIN HYDROCHLORIDE 2000 MG: 10 INJECTION, POWDER, LYOPHILIZED, FOR SOLUTION INTRAVENOUS at 12:38

## 2025-07-02 RX ADMIN — ACETAMINOPHEN 650 MG: 325 TABLET ORAL at 07:40

## 2025-07-02 RX ADMIN — CARVEDILOL 6.25 MG: 6.25 TABLET, FILM COATED ORAL at 16:08

## 2025-07-02 RX ADMIN — SODIUM CHLORIDE 250 MG: 0.9 INJECTION, SOLUTION INTRAVENOUS at 10:33

## 2025-07-02 RX ADMIN — ATORVASTATIN CALCIUM 20 MG: 20 TABLET, FILM COATED ORAL at 20:06

## 2025-07-02 RX ADMIN — PROPRANOLOL HYDROCHLORIDE 10 MG: 10 TABLET ORAL at 07:40

## 2025-07-02 RX ADMIN — IPRATROPIUM BROMIDE AND ALBUTEROL SULFATE 1 DOSE: 2.5; .5 SOLUTION RESPIRATORY (INHALATION) at 14:12

## 2025-07-02 ASSESSMENT — ENCOUNTER SYMPTOMS
VOMITING: 0
DIARRHEA: 0
SHORTNESS OF BREATH: 0
CONSTIPATION: 0
BACK PAIN: 0
NAUSEA: 0
COLOR CHANGE: 0
VOICE CHANGE: 0

## 2025-07-02 ASSESSMENT — PAIN DESCRIPTION - LOCATION: LOCATION: NECK

## 2025-07-02 ASSESSMENT — PAIN DESCRIPTION - DESCRIPTORS: DESCRIPTORS: ACHING

## 2025-07-02 ASSESSMENT — PAIN SCALES - GENERAL
PAINLEVEL_OUTOF10: 0
PAINLEVEL_OUTOF10: 7
PAINLEVEL_OUTOF10: 0
PAINLEVEL_OUTOF10: 2
PAINLEVEL_OUTOF10: 0

## 2025-07-02 ASSESSMENT — PAIN DESCRIPTION - ORIENTATION: ORIENTATION: MID

## 2025-07-02 ASSESSMENT — PAIN - FUNCTIONAL ASSESSMENT: PAIN_FUNCTIONAL_ASSESSMENT: ACTIVITIES ARE NOT PREVENTED

## 2025-07-02 NOTE — PROGRESS NOTES
4 Eyes Skin Assessment     NAME:  Fernando Chu  YOB: 1945  MEDICAL RECORD NUMBER:  [id number]    The patient is being assessed for  Transfer to New Unit    I agree that at least one RN has performed a thorough Head to Toe Skin Assessment on the patient. ALL assessment sites listed below have been assessed.      Areas assessed by both nurses:    Head, Face, Ears, Shoulders, Back, Chest, Arms, Elbows, Hands, Sacrum. Buttock, Coccyx, Ischium, and Legs. Feet and Heels        Does the Patient have a Wound? Yes wound(s) were present on assessment. LDA wound assessment was Initiated and completed by RN       Blake Prevention initiated by RN: No  Wound Care Orders initiated by RN: No    Pressure Injury (Stage 1,2,3,4, Unstageable, DTI, NWPT, and Complex wounds) if present, place Wound referral order by RN under : No    New Ostomies, if present place, Ostomy referral order under : No     Nurse 1 eSignature: Electronically signed by Alka Smith RN on 7/2/25 at 4:44 PM CDT    **SHARE this note so that the co-signing nurse can place an eSignature**    Nurse 2 eSignature: Electronically signed by Mikala Hernandez LPN on 7/2/25 at 4:45 PM CDT

## 2025-07-02 NOTE — PROGRESS NOTES
Fernando Chu received from icu to room # 311 .  Mental Status: Patient is oriented, alert, coherent, logical, thought processes intact, and able to concentrate and follow conversation.   Vitals:    07/02/25 1553   BP: 138/85   Pulse: 78   Resp: 16   Temp: 98.6 °F (37 °C)   SpO2: 94%     Placed on cardiac monitor: Yes. Box # [id number].  Belongings: Glasses with patient at bedside .  Family at bedside No.  Oriented Patient to room.  Call light within reach. Yes.  Transfer was: Well tolerated by patient..    Electronically signed by Alka Smith RN on 7/2/2025 at 4:33 PM

## 2025-07-02 NOTE — PROGRESS NOTES
[facility] Neurology Progress Note      Patient:   Fernando Chu  MR#:    [id number]   Room:    [facility]/311-02   YOB: 1945  Date of Progress Note: 7/2/2025  Time of Note                           4:02 PM  Consulting Physician:  Jhonny Garza DO  Attending Physician:  Thom Sofia DO      INTERVAL HISTORY:  No acute events overnight, no new focal complaints, no further syncopal events.     REVIEW OF SYSTEMS:  Constitutional: No fevers No chills  Neck:No stiffness  Respiratory: No shortness of breath  Cardiovascular: No chest pain No palpitations  Gastrointestinal: No abdominal pain    Genitourinary: No Dysuria  Neurological: No headache, no confusion    PHYSICAL EXAM:    Constitutional -   /85   Pulse 78   Temp 98.6 °F (37 °C) (Oral)   Resp 16   Ht 1.753 m (5' 9\")   Wt 75.8 kg (167 lb 3.2 oz)   SpO2 94%   BMI 24.69 kg/m²   General appearance: No acute distress   EYES -   Conjunctiva normal  Pupillary exam as below, see CN exam in the neurologic exam  ENT-    No scars, masses, or lesions over external nose or ears  Oropharynx without erythema, palate midline  Cardiovascular -   No clubbing, cyanosis, or edema   Pulmonary-   Good expansion, normal effort without use of accessory muscles  Musculoskeletal -   No significant wasting of muscles noted  Gait as below, see gait exam in the neurologic exam  Muscle strength, tone, stability as below see the motor exam in the neurologic exam.   No bony deformities  Skin -   Warm, dry, and intact to inspection and palpation.    No rash, erythema, or pallor  Psychiatric -   Mood, affect, and behavior appear normal    Memory as below see mental status examination in the neurologic exam        NEUROLOGICAL EXAM     Mental status    [x] Awake, alert, oriented   [x] Affect attention and concentration appear appropriate  [x] Recent and remote memory appears unremarkable  [x] Speech normal without dysarthria or aphasia, comprehension and  brain on postcontrast images.  The craniocervical junction and midline structures are normal.  The soft tissues of the skull base and nasopharynx appear normal.  The paranasal sinuses and mastoid air cells are clear.      There is no acute cerebral infarction.  Mild to moderate chronic small vessel ischemic changes seen within the supratentorial white matter.   ______________________________________ Electronically signed by: MARGAUX DEE M.D. Date:     07/02/2025 Time:    06:57     XR CHEST PORTABLE  Result Date: 7/1/2025  EXAM:  FRONTAL VIEW OF THE CHEST.  HISTORY:  New onset shortness of breath.  COMPARISON:  None.  FINDINGS: Aortic calcifications.  Normal heart size.  No acute consolidation.  No visible pleural effusion or pneumothorax.  No acute osseous abnormality. Degenerative changes of the spine and shoulders. Bones appear demineralized.       No acute finding in the chest.     ______________________________________ Electronically signed by: JORDANA DOUGLASS M.D. Date:     07/01/2025 Time:    02:38       Lab Results   Component Value Date    WBC 6.7 07/02/2025    HGB 7.4 (L) 07/02/2025    HCT 22.9 (L) 07/02/2025    MCV 89.8 07/02/2025     07/02/2025     Lab Results   Component Value Date     07/02/2025    K 3.8 07/02/2025     (H) 07/02/2025    CO2 21 (L) 07/02/2025    BUN 24 (H) 07/02/2025    CREATININE 1.0 07/02/2025    GLUCOSE 89 07/02/2025    CALCIUM 8.2 (L) 07/02/2025    BILITOT 0.7 07/01/2025    ALKPHOS 129 07/01/2025    AST 67 (H) 07/01/2025    ALT 28 07/01/2025    LABGLOM 76 07/02/2025    GFRAA >59 08/17/2022   No results found for: \"INR\", \"PROTIME\"    RECORD REVIEW:   Previous medical records, medications were reviewed at today's visit.  Nursing/physician notes, imaging, labs and vitals reviewed. PT,OT and/or speech notes reviewed      ASSESSMENT:  80 y.o. admitted with a syncopal event, elevated troponin, tremulousness, mild rhabdomyolysis, possible sepsis. TIA, stroke, seizure felt

## 2025-07-02 NOTE — PLAN OF CARE
Problem: Safety - Adult  Goal: Free from fall injury  7/2/2025 0924 by Bambi Lugo, RN  Outcome: Progressing  Flowsheets (Taken 7/2/2025 0900)  Free From Fall Injury: Instruct family/caregiver on patient safety  7/2/2025 0012 by Boaz Vazquez RN  Outcome: Progressing     Problem: ABCDS Injury Assessment  Goal: Absence of physical injury  7/2/2025 0924 by Bambi Lugo, RN  Outcome: Progressing  7/2/2025 0012 by Boaz Vazquez RN  Outcome: Progressing     Problem: Pain  Goal: Verbalizes/displays adequate comfort level or baseline comfort level  7/2/2025 0924 by Bambi Lugo, RN  Outcome: Progressing  7/2/2025 0012 by Boaz Vazquez RN  Outcome: Progressing  Flowsheets  Taken 7/2/2025 0000  Verbalizes/displays adequate comfort level or baseline comfort level: Encourage patient to monitor pain and request assistance  Taken 7/1/2025 2300  Verbalizes/displays adequate comfort level or baseline comfort level: Encourage patient to monitor pain and request assistance  Taken 7/1/2025 2100  Verbalizes/displays adequate comfort level or baseline comfort level: Encourage patient to monitor pain and request assistance  Taken 7/1/2025 2000  Verbalizes/displays adequate comfort level or baseline comfort level: Encourage patient to monitor pain and request assistance     Problem: Discharge Planning  Goal: Discharge to home or other facility with appropriate resources  7/2/2025 0924 by Bambi Lugo, RN  Outcome: Progressing  Flowsheets (Taken 7/2/2025 0800)  Discharge to home or other facility with appropriate resources: Identify barriers to discharge with patient and caregiver  7/2/2025 0012 by Boaz Vazquez, RN  Outcome: Progressing

## 2025-07-02 NOTE — PROCEDURES
ADULT INPATIENT ELECTROENCEPHALOGRAM REPORT    Patient:   Fernando Chu  MR#:    [id number]  Room #:    INPATIENT  YOB: 1945  Date of Evaluation:  7/1/2025  Primary Physician:     Misty Blas APRN   Referring Physician:   Jhonny Garza DO      CLINICAL INFORMATION:     This patient is a 80 y.o. male with a history of syncope.     MEDICATIONS:     See MAR.    RECORDING CONDITIONS:     This EEG was performed utilizing standard International 10-20 System of electrode placement, with additional channels monitored for eye movement. One channel electrocardiogram was monitored. Data was obtained, stored, and interpreted according to ACNS guidelines (J Clin Neurophysiol 2006;23(2):) utilizing referential montage recording, with reformatting to longitudinal, transverse bipolar, and referential montages as necessary for interpretation, along with the digital/automated EEG analysis. Patient tolerated entire procedure well. Photic stimulation and hyperventilation were utilized as activation procedures unless otherwise specified below.     E.E.G. DESCRIPTION:     The resting predominant posterior background frequency is a 9-10 Hz 30-40 uV rhythm.  No overt focal, lateralizing, or paroxysmal abnormalities were noted through the recording.  Drowsiness was demonstrated by slow rolling eye movements followed by a loss of the background waking activities. Hyperventilation was not performed.  Photic stimulation was performed and had little change on the recording. Muscle, motion, and eye movement artifacts were noted.       EEG INTERPRETATION:    Normal EEG for age in the awake and drowsy states.        CLINICAL CORRELATION:     The absence of epileptiform abnormalities does not preclude a clinical diagnosis of seizures.       Jhonny Garza DO  Board Certified Neurologist    Date reported: 7/1/2025  Date signed: 7/1/2025

## 2025-07-02 NOTE — PROGRESS NOTES
Hospitalist Progress Note    Patient:  Fernando Chu  YOB: 1945  Date of Service: 7/2/2025  MRN: [id number]   Acct: [id number]   Primary Care Physician: Misty Blas APRN  Advance Directive: Full Code  Admit Date: 6/30/2025       Hospital Day: 2  Referring Provider: Thom Sofia DO    Patient Seen, Chart, Consults, Notes, Labs, Radiology studies reviewed.    Subjective:  Fernando Chu is a 80 y.o. male  whom we are following for syncope, NSTEMI, hypertension.  He is feeling better than he did on admission.  He notes that he will have \"diffuse muscle spasms\" and is having difficulty with ambulation.  He fell striking his head prior to admission.  Neurologic workup has been unrevealing thus far.  Hemodynamics are stable.  He is stable for transfer out of ICU.    Allergies:  Demerol hcl [meperidine]    Medicines:  Current Facility-Administered Medications   Medication Dose Route Frequency Provider Last Rate Last Admin    ipratropium 0.5 mg-albuterol 2.5 mg (DUONEB) nebulizer solution 1 Dose  1 Dose Inhalation Q4H RT Evan Espinoza MD   1 Dose at 07/02/25 0617    propranolol (INDERAL) immediate release tablet 10 mg  10 mg Oral BID Zenon James MD   10 mg at 07/02/25 0740    lactobacillus (CULTURELLE) capsule 1 capsule  1 capsule Oral Daily with breakfast Lowell Taylor MD   1 capsule at 07/02/25 0740    ferrous sulfate (IRON 325) tablet 325 mg  325 mg Oral BID  Lowell Taylor MD   325 mg at 07/02/25 0740    potassium chloride (KLOR-CON M) extended release tablet 40 mEq  40 mEq Oral PRN Quintin Cantu APRN - CNP        Or    potassium bicarb-citric acid (EFFER-K) effervescent tablet 40 mEq  40 mEq Oral PRN Quintin Cantu APRN - CNP        Or    potassium chloride 10 mEq/100 mL IVPB (Peripheral Line)  10 mEq IntraVENous PRN Quintin Cantu APRN - CNP        magnesium sulfate 2000 mg in 50 mL IVPB premix  2,000 mg IntraVENous PRN Quintin Cantu APRN - CNP        enoxaparin

## 2025-07-02 NOTE — CONSULTS
[facility]  Cardiology Consult    History obtained from:   [] Patient  [] Other (specify):     Patient:  Fernando Chu  [id number]  Admit Date: 6/30/2025       Hospital Day: 1  Referring Provider: Lowell Taylor MD    Admission Problem List: Present on Admission:   Syncope and collapse   Elevated troponin   Fever   Elevated CK       Chief Complaint: No chief complaint on file.        HPI     Mr. Chu is a 80 y.o. male with a history of PCI in 2024, pancreatitis, HTN who presents with syncope, tachycardia and uncontrolled shaking. Patient lives independently however over the last 2-3 weeks have started experiencing syncopal episodes. His most recent one occurred while in the bathroom. The patient does not recall the event quite well but does remember associated N/V and inability to get up due to weakness for a few hours. He denies prior such episodes. He has a history of essential tremor for which he has been on propranolol at home. The dose was decreased in the last few months due to progressively lower BP. He denies any other illness, CP,SOB.     Telemetry demonstrates occassional SVT espcially in AM today. Per RN, this coincided with another episode of tremors that apparently resolved with adminstration of dilaudid.     Patient denies any other symptoms.      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 has been removed        Past Surgical History:  Past Surgical History:   Procedure Laterality Date    CARDIAC PROCEDURE N/A 10/10/2024    Left heart cath / coronary angiography performed by Ronan Carr MD at [facility] CARDIAC CATH LAB    CARDIAC

## 2025-07-02 NOTE — PLAN OF CARE
Problem: Safety - Adult  Goal: Free from fall injury  7/2/2025 0012 by Boaz Vazquez, RN  Outcome: Progressing  7/1/2025 1041 by Amy Wheeler RN  Outcome: Progressing     Problem: ABCDS Injury Assessment  Goal: Absence of physical injury  7/2/2025 0012 by Boaz Vazquez, RN  Outcome: Progressing  7/1/2025 1041 by Amy Wheeler RN  Outcome: Progressing     Problem: Pain  Goal: Verbalizes/displays adequate comfort level or baseline comfort level  7/2/2025 0012 by Boaz Vazquez RN  Outcome: Progressing  Flowsheets  Taken 7/2/2025 0000  Verbalizes/displays adequate comfort level or baseline comfort level: Encourage patient to monitor pain and request assistance  Taken 7/1/2025 2300  Verbalizes/displays adequate comfort level or baseline comfort level: Encourage patient to monitor pain and request assistance  Taken 7/1/2025 2100  Verbalizes/displays adequate comfort level or baseline comfort level: Encourage patient to monitor pain and request assistance  Taken 7/1/2025 2000  Verbalizes/displays adequate comfort level or baseline comfort level: Encourage patient to monitor pain and request assistance  7/1/2025 1041 by Amy Wheeler RN  Outcome: Progressing     Problem: Discharge Planning  Goal: Discharge to home or other facility with appropriate resources  7/2/2025 0012 by Boaz Vazquez RN  Outcome: Progressing  7/1/2025 1041 by Amy Wheeler RN  Outcome: Progressing

## 2025-07-02 NOTE — PROGRESS NOTES
[facility]   Pharmacy Pharmacokinetic Monitoring Service - Vancomycin    Consulting Provider: Thom Sofia DO   Indication: Bloodstream Infection   Therapeutic Target: AUC/CHINO 400-600 mg*hr/L  Day of Therapy: 2  Additional Antimicrobials: None ordered at this time    Pertinent Laboratory Values:   Wt Readings from Last 1 Encounters:   06/30/25 75.8 kg (167 lb 3.2 oz)     Temp Readings from Last 1 Encounters:   07/02/25 97.9 °F (36.6 °C) (Temporal)     Estimated Creatinine Clearance: 58 mL/min (based on SCr of 1 mg/dL).  Recent Labs     07/01/25  0137 07/02/25  0108   CREATININE 1.0 1.0   BUN 20 24*   WBC 6.7 6.7     Procalcitonin: None ordered at this time    Pertinent Cultures:  Culture Date Source Results   06/30/25 Blood x 2 1 of 2 showing Gram positive cocci in chains and/or pairs resembling Streptococcus    06/30/25 Molecular Blood ID Streptococcus species by PCR    07/01/25 Urine Growth too young, additional incubation required   MRSA Nasal Swab: was negative on 07/01/25, not ordered for respiratory indication    Recent vancomycin administrations                     vancomycin (VANCOCIN) 2,000 mg in sodium chloride 0.9 % 500 mL IVPB (mg) 2,000 mg New Bag 07/01/25 0009                    Assessment:  Date/Time Current Dose Concentration Timing of Concentration (h)   07/02/25 Pulse dose 5.7 34 h 14 m   Note: Serum concentrations collected for AUC dosing may appear elevated if collected in close proximity to the dose administered, this is not necessarily an indication of toxicity    Plan:  Current dosing regimen is sub-therapeutic  Re-load with Vancomycin 2000 mg IV x 1 followed by a maintenance dose of Vancomycin 1500 mg IV every 24 hours  Repeat vancomycin concentration ordered for 07/04/25 @ 1130   Pharmacy will continue to monitor patient and adjust therapy as indicated    Thank you for the consult,    Sonali Stringer, [facility]  7/2/2025 11:29 AM

## 2025-07-03 LAB
BACTERIA UR CULT: ABNORMAL
BACTERIA UR CULT: ABNORMAL
ECHO BSA: 1.92 M2
ORGANISM: ABNORMAL
VAS LEFT ARM BP DIA: 69 MMHG
VAS LEFT ARM BP: 114 MMHG
VAS LEFT CCA MID EDV: 29.1 CM/S
VAS LEFT CCA MID PSV: 84 CM/S
VAS LEFT CCA PROX EDV: 28 CM/S
VAS LEFT CCA PROX PSV: 94.9 CM/S
VAS LEFT ECA PSV: 94.9 CM/S
VAS LEFT ICA DIST EDV: 32.4 CM/S
VAS LEFT ICA DIST PSV: 72.4 CM/S
VAS LEFT ICA MID EDV: 31.8 CM/S
VAS LEFT ICA MID PSV: 72.4 CM/S
VAS LEFT ICA PROX EDV: 29.6 CM/S
VAS LEFT ICA PROX PSV: 70.8 CM/S
VAS LEFT VERTEBRAL EDV: 24.1 CM/S
VAS LEFT VERTEBRAL PSV: 67 CM/S
VAS RIGHT ARM BP DIA: 64 MMHG
VAS RIGHT ARM BP: 108 MMHG
VAS RIGHT CCA MID EDV: 21.1 CM/S
VAS RIGHT CCA MID PSV: 84.5 CM/S
VAS RIGHT CCA PROX EDV: 23.6 CM/S
VAS RIGHT CCA PROX PSV: 90.7 CM/S
VAS RIGHT ECA EDV: 8.78 CM/S
VAS RIGHT ECA PSV: 83.4 CM/S
VAS RIGHT ICA DIST EDV: 36.4 CM/S
VAS RIGHT ICA DIST PSV: 81.1 CM/S
VAS RIGHT ICA MID EDV: 30.3 CM/S
VAS RIGHT ICA MID PSV: 72.9 CM/S
VAS RIGHT ICA PROX EDV: 17.6 CM/S
VAS RIGHT ICA PROX PSV: 47 CM/S
VAS RIGHT VERTEBRAL EDV: 20.8 CM/S
VAS RIGHT VERTEBRAL PSV: 57.2 CM/S

## 2025-07-03 PROCEDURE — 6360000002 HC RX W HCPCS: Performed by: INTERNAL MEDICINE

## 2025-07-03 PROCEDURE — 6370000000 HC RX 637 (ALT 250 FOR IP): Performed by: INTERNAL MEDICINE

## 2025-07-03 PROCEDURE — 2500000003 HC RX 250 WO HCPCS: Performed by: INTERNAL MEDICINE

## 2025-07-03 PROCEDURE — 94640 AIRWAY INHALATION TREATMENT: CPT

## 2025-07-03 PROCEDURE — 94150 VITAL CAPACITY TEST: CPT

## 2025-07-03 PROCEDURE — 1200000000 HC SEMI PRIVATE

## 2025-07-03 PROCEDURE — 99232 SBSQ HOSP IP/OBS MODERATE 35: CPT | Performed by: PSYCHIATRY & NEUROLOGY

## 2025-07-03 PROCEDURE — 2580000003 HC RX 258: Performed by: INTERNAL MEDICINE

## 2025-07-03 PROCEDURE — 93880 EXTRACRANIAL BILAT STUDY: CPT | Performed by: SURGERY

## 2025-07-03 PROCEDURE — 87040 BLOOD CULTURE FOR BACTERIA: CPT

## 2025-07-03 PROCEDURE — 36415 COLL VENOUS BLD VENIPUNCTURE: CPT

## 2025-07-03 PROCEDURE — 94760 N-INVAS EAR/PLS OXIMETRY 1: CPT

## 2025-07-03 PROCEDURE — 97535 SELF CARE MNGMENT TRAINING: CPT

## 2025-07-03 PROCEDURE — 97165 OT EVAL LOW COMPLEX 30 MIN: CPT

## 2025-07-03 RX ORDER — SPIRONOLACTONE 25 MG/1
12.5 TABLET ORAL DAILY
Status: DISCONTINUED | OUTPATIENT
Start: 2025-07-03 | End: 2025-07-05 | Stop reason: HOSPADM

## 2025-07-03 RX ORDER — DONEPEZIL HYDROCHLORIDE 10 MG/1
10 TABLET, FILM COATED ORAL NIGHTLY
Status: DISCONTINUED | OUTPATIENT
Start: 2025-07-03 | End: 2025-07-05 | Stop reason: HOSPADM

## 2025-07-03 RX ADMIN — ASPIRIN 81 MG: 81 TABLET, COATED ORAL at 08:05

## 2025-07-03 RX ADMIN — CARVEDILOL 6.25 MG: 6.25 TABLET, FILM COATED ORAL at 17:02

## 2025-07-03 RX ADMIN — SPIRONOLACTONE 12.5 MG: 25 TABLET ORAL at 14:27

## 2025-07-03 RX ADMIN — PANTOPRAZOLE SODIUM 40 MG: 40 TABLET, DELAYED RELEASE ORAL at 06:07

## 2025-07-03 RX ADMIN — SODIUM CHLORIDE, PRESERVATIVE FREE 10 ML: 5 INJECTION INTRAVENOUS at 08:05

## 2025-07-03 RX ADMIN — CLOPIDOGREL BISULFATE 75 MG: 75 TABLET, FILM COATED ORAL at 08:05

## 2025-07-03 RX ADMIN — SACUBITRIL AND VALSARTAN 1 TABLET: 24; 26 TABLET, FILM COATED ORAL at 10:10

## 2025-07-03 RX ADMIN — IPRATROPIUM BROMIDE AND ALBUTEROL SULFATE 1 DOSE: 2.5; .5 SOLUTION RESPIRATORY (INHALATION) at 07:27

## 2025-07-03 RX ADMIN — IPRATROPIUM BROMIDE AND ALBUTEROL SULFATE 1 DOSE: 2.5; .5 SOLUTION RESPIRATORY (INHALATION) at 19:38

## 2025-07-03 RX ADMIN — ENOXAPARIN SODIUM 40 MG: 100 INJECTION SUBCUTANEOUS at 08:05

## 2025-07-03 RX ADMIN — IPRATROPIUM BROMIDE AND ALBUTEROL SULFATE 1 DOSE: 2.5; .5 SOLUTION RESPIRATORY (INHALATION) at 02:46

## 2025-07-03 RX ADMIN — DONEPEZIL HYDROCHLORIDE 10 MG: 10 TABLET, FILM COATED ORAL at 21:21

## 2025-07-03 RX ADMIN — ATORVASTATIN CALCIUM 20 MG: 20 TABLET, FILM COATED ORAL at 21:21

## 2025-07-03 RX ADMIN — IPRATROPIUM BROMIDE AND ALBUTEROL SULFATE 1 DOSE: 2.5; .5 SOLUTION RESPIRATORY (INHALATION) at 14:45

## 2025-07-03 RX ADMIN — SACUBITRIL AND VALSARTAN 1 TABLET: 24; 26 TABLET, FILM COATED ORAL at 21:21

## 2025-07-03 RX ADMIN — SODIUM CHLORIDE 250 MG: 0.9 INJECTION, SOLUTION INTRAVENOUS at 08:10

## 2025-07-03 RX ADMIN — IPRATROPIUM BROMIDE AND ALBUTEROL SULFATE 1 DOSE: 2.5; .5 SOLUTION RESPIRATORY (INHALATION) at 22:29

## 2025-07-03 RX ADMIN — Medication 1 CAPSULE: at 08:05

## 2025-07-03 RX ADMIN — PAROXETINE HYDROCHLORIDE 20 MG: 10 TABLET, FILM COATED ORAL at 08:05

## 2025-07-03 RX ADMIN — VANCOMYCIN HYDROCHLORIDE 1500 MG: 10 INJECTION, POWDER, LYOPHILIZED, FOR SOLUTION INTRAVENOUS at 11:35

## 2025-07-03 RX ADMIN — IPRATROPIUM BROMIDE AND ALBUTEROL SULFATE 1 DOSE: 2.5; .5 SOLUTION RESPIRATORY (INHALATION) at 10:29

## 2025-07-03 RX ADMIN — CARVEDILOL 6.25 MG: 6.25 TABLET, FILM COATED ORAL at 08:05

## 2025-07-03 ASSESSMENT — ENCOUNTER SYMPTOMS
VOMITING: 0
BACK PAIN: 0
DIARRHEA: 0
NAUSEA: 0
VOICE CHANGE: 0
COLOR CHANGE: 0
CONSTIPATION: 0
SHORTNESS OF BREATH: 0

## 2025-07-03 NOTE — PLAN OF CARE
Problem: Safety - Adult  Goal: Free from fall injury  Outcome: Progressing     Problem: ABCDS Injury Assessment  Goal: Absence of physical injury  Outcome: Progressing     Problem: Pain  Goal: Verbalizes/displays adequate comfort level or baseline comfort level  Outcome: Progressing     Problem: Discharge Planning  Goal: Discharge to home or other facility with appropriate resources  Outcome: Progressing  Flowsheets (Taken 7/2/2025 2007)  Discharge to home or other facility with appropriate resources: Identify barriers to discharge with patient and caregiver

## 2025-07-03 NOTE — PROGRESS NOTES
Hospitalist Progress Note    Patient:  Fernando Chu  YOB: 1945  Date of Service: 7/3/2025  MRN: [id number]   Acct: [id number]   Primary Care Physician: Misty Blas APRN  Advance Directive: Full Code  Admit Date: 6/30/2025       Hospital Day: 3  Referring Provider: Thom Sofia DO    Patient Seen, Chart, Consults, Notes, Labs, Radiology studies reviewed.    Subjective:  Fernando Chu is a 80 y.o. male  whom we are following for syncope, NSTEMI, hypertension.   He was moved to med/surg yesterday.  He continues to do well.  He has been afebrile.  His blood cultures drawn from [facility] ER were reported to us yesterday to be growing strep species.  We repeated blood cultures on admission at our facility and they are also growing out strep.  Further ID and sensitivities pending.  He was started on empirically on vancomycin yesterday.  He had an echo on admission.  He has new onset systolic dysfunction.  There is no report of any valvular vegetations.  Again, he has been afebrile during admission here.  He states that he had been having night sweats prior to admission.  He had a routine dental cleaning within the last month.  He has a history of stage III colon CA diagnosed in 2010 and is up to date on his colonoscopies.  At this point, we will continue vancomycin until ID and sensitivities are back.  I will repeat blood cultures today for surveillance.  If persistently positive, then may need BAUTISTA.  Right now, no obvious source for his bacteremia.    Allergies:  Demerol hcl [meperidine]    Medicines:  Current Facility-Administered Medications   Medication Dose Route Frequency Provider Last Rate Last Admin    ferric gluconate (FERRLECIT) 250 mg in sodium chloride 0.9 % 250 mL IVPB  250 mg IntraVENous Daily Thom Sofia  mL/hr at 07/03/25 0810 250 mg at 07/03/25 0810    epoetin trista-epbx (RETACRIT) injection 10,000 Units  10,000 Units SubCUTAneous Weekly [place]  \"CRP\" in the last 72 hours.  Sed Rate:  No results for input(s): \"SEDRATE\" in the last 72 hours.    Cultures:   No results for input(s): \"CULTURE\" in the last 72 hours.  Recent Labs     06/30/25  2256 06/30/25  2301   BC  --  Gram stain Aerobic bottle:  Gram stain Anaerobic bottle:  Gram positive cocci in chains and/or pairs  resembling Streptococcus  Culture in progress  Please notify Physician   Bottle volume = 8 ml   Bottle volume = 9 ml  *   BLOODCULT2 Gram stain Anaerobic bottle:  Gram positive cocci in chains and/or pairs  resembling Streptococcus  Culture in progress  Please notify Physician   Bottle volume = >10 ml  *  --      No results for input(s): \"CXSURG\" in the last 72 hours.    Radiology reports as per the Radiologist  Radiology: Echo (TTE) complete (PRN contrast/bubble/strain/3D)  Result Date: 7/2/2025    Left Ventricle: Mildly reduced left ventricular systolic function with a visually estimated EF of 40 - 45%. EF by visual approximation is 40%. Left ventricle is mildly dilated. Moderately increased wall thickness. See diagram for wall motion findings. Grade II diastolic dysfunction with increased LAP.   Right Ventricle: Right ventricle is mildly dilated. Normal systolic function. TAPSE is normal.   Aortic Valve: Mild regurgitation.   Mitral Valve: Mildly thickened leaflets. Mild regurgitation.   Tricuspid Valve: Mild regurgitation. Mildly elevated RVSP, consistent with mild pulmonary hypertension.   Left Atrium: Left atrium is moderately dilated. Left atrial volume index is moderately increased (42-48 mL/m2) mL/m2.   Interatrial Septum: Agitated saline study was negative with and without provocation.   Pericardium: Small (<1 cm) pericardial effusion present.   Image quality is adequate. Contrast used: Lumason.     MRI BRAIN W WO CONTRAST  Result Date: 7/2/2025  EXAM:  MRI OF THE BRAIN WITHOUT AND WITH CONTRAST  HISTORY:  Syncope  TECHNIQUE:  Multiplanar imaging of the brain was performed using T1,

## 2025-07-03 NOTE — PROGRESS NOTES
Occupational Therapy Initial Assessment  Date: 7/3/2025   Patient Name: Fernando Chu  MRN: [id number]     : 1945    Date of Service: 7/3/2025    Discharge Recommendations:  Home with assist PRN (states daughter will be coming to stay initially)       Assessment   Assessment: Evaluation completed and tx initiated.  No barriers to stated discharge plan identified.  Treatment Diagnosis: Syncope and collapse    REQUIRES OT FOLLOW-UP: no  Activity Tolerance  Activity Tolerance: Patient Tolerated treatment well           Patient Diagnosis(es): The primary encounter diagnosis was Syncope, unspecified syncope type. Diagnoses of TIA (transient ischemic attack) and Palpitations were also pertinent to this visit.   has a past medical history of Cancer (HCC), GERD (gastroesophageal reflux disease), Hyperlipidemia, Hypertension, Iron deficiency anemia secondary to inadequate dietary iron intake, Iron malabsorption, Pancreatitis, and Port-A-Cath in place.   has a past surgical history that includes fracture surgery (1990); hernia repair (11/15/2004); Colon surgery (2010); Colonoscopy (2013); Colonoscopy (2016); other surgical history; Cardiac procedure (N/A, 10/10/2024); Cardiac procedure (N/A, 10/10/2024); Cardiac procedure (N/A, 2024); Cardiac procedure (N/A, 2024); Kidney stone surgery; and other surgical history.    Treatment Diagnosis: Syncope and collapse      Restrictions  Restrictions/Precautions  Restrictions/Precautions: Fall Risk    Subjective   General  Chart Reviewed: Yes  Patient assessed for rehabilitation services?: Yes  Family / Caregiver Present: No  Oxygen Therapy  O2 Device: None (Room air)    Social/Functional History  Social/Functional History  Lives With: Spouse  Type of Home: House  Home Layout: One level  Home Access: Stairs to enter with rails  Entrance Stairs - Number of Steps: 3  Bathroom Shower/Tub: Tub/Shower unit  Home Equipment: Rollator (was his

## 2025-07-03 NOTE — PROGRESS NOTES
Cardiology Progress Note   Zenon James MD      Patient:  Fernando Chu  [id number]  Admit Date: 6/30/2025       Hospital Day: 2  Referring Provider: Thom Sofia DO    Admission Problem List: Present on Admission:   Syncope and collapse   Elevated troponin   Fever   Elevated CK        Subjective     Mr. Chu is clinically feeling better  No further shaking  Not enough telemetry data available given recent transfer from ICU to the floor however what is available shows sinus rhythm        Objective      /76   Pulse 66   Temp 99.1 °F (37.3 °C) (Temporal)   Resp 16   Ht 1.753 m (5' 9\")   Wt 75.8 kg (167 lb 3.2 oz)   SpO2 95%   BMI 24.69 kg/m²       Intake/Output Summary (Last 24 hours) at 7/2/2025 2024  Last data filed at 7/2/2025 1300  Gross per 24 hour   Intake 1251.59 ml   Output 575 ml   Net 676.59 ml         Physical Exam:      Physical Exam  Vitals and nursing note reviewed.   Constitutional:       Appearance: Normal appearance.   HENT:      Head: Normocephalic and atraumatic.   Eyes:      Extraocular Movements: Extraocular movements intact.   Cardiovascular:      Rate and Rhythm: Normal rate and regular rhythm.      Heart sounds: Murmur heard.   Pulmonary:      Effort: Pulmonary effort is normal.      Breath sounds: Normal breath sounds.   Abdominal:      Palpations: Abdomen is soft.   Musculoskeletal:      Right lower leg: No edema.      Left lower leg: No edema.   Skin:     General: Skin is warm and dry.   Neurological:      General: No focal deficit present.      Mental Status: He is alert.   Psychiatric:         Mood and Affect: Mood normal.           Lab Data:  CBC:   Recent Labs     07/01/25  0137 07/01/25  1627 07/02/25  0108   WBC 6.7  --  6.7   HGB 7.4* 7.4* 7.4*   HCT 22.8* 21.8* 22.9*   MCV 92.7  --  89.8     --  201     BMP:   Recent Labs     06/30/25  2301 07/01/25  0137 07/02/25  0108    142 139   K 3.7 3.8  4.2 3.8    112* 109*   CO2 20* 14* 21*

## 2025-07-03 NOTE — PROGRESS NOTES
Comprehensive Nutrition Assessment    Type and Reason for Visit:  Initial, Positive nutrition screen    Nutrition Recommendations/Plan:   Continue current POC     Malnutrition Assessment:  Malnutrition Status:  No malnutrition (07/03/25 0824)    Context:  Acute Illness     Findings of the 6 clinical characteristics of malnutrition:  Energy Intake:  No decrease in energy intake  Weight Loss:  No weight loss     Body Fat Loss:  No body fat loss     Muscle Mass Loss:  No muscle mass loss    Fluid Accumulation:  No fluid accumulation     Strength:  Not Performed    Nutrition Assessment:    +NS for decreased weight.  Patient is receiving a Regular diet.  PO intake is good with intake ranging 50-75%. Current weight has increased by 13# or 7.8% from previous weight on 4/15/2025.    Nutrition Related Findings:    no edema Wound Type:  (laceration)       Current Nutrition Intake & Therapies:    Average Meal Intake: 51-75%  Average Supplements Intake: None Ordered  ADULT DIET; Regular    Anthropometric Measures:  Height: 175.3 cm (5' 9.02\")  Ideal Body Weight (IBW): 160 lbs (73 kg)    Admission Body Weight: 75.8 kg (167 lb 1.7 oz)  Current Body Weight: 75.8 kg (167 lb 1.7 oz), 104.4 % IBW.    Current BMI (kg/m2): 24.7  Usual Body Weight: 69.9 kg (154 lb 1.6 oz) (4/15/2025)  % Weight Change (Calculated): 8.4  Weight Adjustment For: No Adjustment  BMI Categories: Normal Weight (BMI 22.0 to 24.9) age over 65    Estimated Daily Nutrient Needs:  Energy Requirements Based On: Kcal/kg  Weight Used for Energy Requirements: Current  Energy (kcal/day): 4642-2799 kcals (25-30 kcals)  Weight Used for Protein Requirements: Current  Protein (g/day): 76-152g  Method Used for Fluid Requirements: 1 ml/kcal  Fluid (ml/day): 6885-7485 ml    Nutrition Diagnosis:   Increased nutrient needs related to increase demand for energy/nutrients as evidenced by wounds    Nutrition Interventions:   Food and/or Nutrient Delivery: Continue Current

## 2025-07-03 NOTE — PROGRESS NOTES
[facility] Neurology Progress Note      Patient:   Fernando Chu  MR#:    [id number]   Room:    [facility]/311-02   YOB: 1945  Date of Progress Note: 7/3/2025  Time of Note                           12:02 PM  Consulting Physician:  Jhonny Garza DO  Attending Physician:  Thom Sofia DO      INTERVAL HISTORY:  No acute events overnight, no new focal complaints, no further syncopal events noted.     REVIEW OF SYSTEMS:  Constitutional: No fevers No chills  Neck:No stiffness  Respiratory: No shortness of breath  Cardiovascular: No chest pain No palpitations  Gastrointestinal: No abdominal pain    Genitourinary: No Dysuria  Neurological: No headache, no confusion    PHYSICAL EXAM:    Constitutional -   BP (!) 147/83   Pulse 69   Temp 97 °F (36.1 °C) (Oral)   Resp 16   Ht 1.753 m (5' 9.02\")   Wt 75.8 kg (167 lb 3.2 oz)   SpO2 97%   BMI 24.68 kg/m²   General appearance: No acute distress   EYES -   Conjunctiva normal  Pupillary exam as below, see CN exam in the neurologic exam  ENT-    No scars, masses, or lesions over external nose or ears  Oropharynx without erythema, palate midline  Cardiovascular -   No clubbing, cyanosis, or edema   Pulmonary-   Good expansion, normal effort without use of accessory muscles  Musculoskeletal -   No significant wasting of muscles noted  Gait as below, see gait exam in the neurologic exam  Muscle strength, tone, stability as below see the motor exam in the neurologic exam.   No bony deformities  Skin -   Warm, dry, and intact to inspection and palpation.    No rash, erythema, or pallor  Psychiatric -   Mood, affect, and behavior appear normal    Memory as below see mental status examination in the neurologic exam        NEUROLOGICAL EXAM     Mental status    [x] Awake, alert, oriented   [x] Affect attention and concentration appear appropriate  [x] Recent and remote memory appears unremarkable  [x] Speech normal without dysarthria or aphasia,  stroke, seizure felt unlikely.  MRI brain with nothing acute.  EEG normal. CD negative. ECHO with reduced EF.   Exam stable.      PLAN:   Follow tele   Follow orthostatic's     Cardiology following    Hold off on ASM's given negative workup    Follow tremor, question parkinsonism, will follow exam, consider treatment if worsens / needed.  May be best to initiate as outpatient.       Please feel free to call with any questions.   [phone] (cell phone).      Jhonny Garza,   Board Certified Neurology

## 2025-07-04 LAB
ALBUMIN SERPL-MCNC: 2.8 G/DL (ref 3.5–5.2)
ALP SERPL-CCNC: 178 U/L (ref 40–129)
ALT SERPL-CCNC: 30 U/L (ref 10–50)
ANION GAP SERPL CALCULATED.3IONS-SCNC: 10 MMOL/L (ref 8–16)
AST SERPL-CCNC: 39 U/L (ref 10–50)
BACTERIA BLD CULT ORG #2: ABNORMAL
BACTERIA BLD CULT ORG #2: ABNORMAL
BACTERIA BLD CULT ORG #2: NORMAL
BACTERIA BLD CULT: ABNORMAL
BACTERIA BLD CULT: ABNORMAL
BACTERIA BLD CULT: NORMAL
BASOPHILS # BLD: 0 K/UL (ref 0–0.2)
BASOPHILS NFR BLD: 0.2 % (ref 0–1)
BILIRUB SERPL-MCNC: 0.6 MG/DL (ref 0.2–1.2)
BUN SERPL-MCNC: 15 MG/DL (ref 8–23)
CALCIUM SERPL-MCNC: 8.3 MG/DL (ref 8.8–10.2)
CHLORIDE SERPL-SCNC: 109 MMOL/L (ref 98–107)
CO2 SERPL-SCNC: 21 MMOL/L (ref 22–29)
CREAT SERPL-MCNC: 1 MG/DL (ref 0.7–1.2)
EOSINOPHIL # BLD: 0.1 K/UL (ref 0–0.6)
EOSINOPHIL NFR BLD: 1.4 % (ref 0–5)
ERYTHROCYTE [DISTWIDTH] IN BLOOD BY AUTOMATED COUNT: 14 % (ref 11.5–14.5)
GLUCOSE SERPL-MCNC: 119 MG/DL (ref 70–99)
HCT VFR BLD AUTO: 24.1 % (ref 42–52)
HGB BLD-MCNC: 7.9 G/DL (ref 14–18)
IMM GRANULOCYTES # BLD: 0.1 K/UL
LYMPHOCYTES # BLD: 1 K/UL (ref 1.1–4.5)
LYMPHOCYTES NFR BLD: 11.5 % (ref 20–40)
MAGNESIUM SERPL-MCNC: 1.8 MG/DL (ref 1.6–2.4)
MCH RBC QN AUTO: 29 PG (ref 27–31)
MCHC RBC AUTO-ENTMCNC: 32.8 G/DL (ref 33–37)
MCV RBC AUTO: 88.6 FL (ref 80–94)
MONOCYTES # BLD: 0.6 K/UL (ref 0–0.9)
MONOCYTES NFR BLD: 6.3 % (ref 0–10)
NEUTROPHILS # BLD: 7 K/UL (ref 1.5–7.5)
NEUTS SEG NFR BLD: 79.7 % (ref 50–65)
ORGANISM: ABNORMAL
ORGANISM: ABNORMAL
PHOSPHATE SERPL-MCNC: 2 MG/DL (ref 2.5–4.5)
PLATELET # BLD AUTO: 252 K/UL (ref 130–400)
PMV BLD AUTO: 9.9 FL (ref 9.4–12.4)
POTASSIUM SERPL-SCNC: 3.2 MMOL/L (ref 3.5–5.1)
PROT SERPL-MCNC: 6 G/DL (ref 6.4–8.3)
RBC # BLD AUTO: 2.72 M/UL (ref 4.7–6.1)
SODIUM SERPL-SCNC: 140 MMOL/L (ref 136–145)
WBC # BLD AUTO: 8.8 K/UL (ref 4.8–10.8)

## 2025-07-04 PROCEDURE — 36415 COLL VENOUS BLD VENIPUNCTURE: CPT

## 2025-07-04 PROCEDURE — 99232 SBSQ HOSP IP/OBS MODERATE 35: CPT | Performed by: PSYCHIATRY & NEUROLOGY

## 2025-07-04 PROCEDURE — 6360000002 HC RX W HCPCS: Performed by: INTERNAL MEDICINE

## 2025-07-04 PROCEDURE — 94150 VITAL CAPACITY TEST: CPT

## 2025-07-04 PROCEDURE — 80053 COMPREHEN METABOLIC PANEL: CPT

## 2025-07-04 PROCEDURE — 1200000000 HC SEMI PRIVATE

## 2025-07-04 PROCEDURE — 2580000003 HC RX 258: Performed by: INTERNAL MEDICINE

## 2025-07-04 PROCEDURE — 6370000000 HC RX 637 (ALT 250 FOR IP): Performed by: INTERNAL MEDICINE

## 2025-07-04 PROCEDURE — 85025 COMPLETE CBC W/AUTO DIFF WBC: CPT

## 2025-07-04 PROCEDURE — 83735 ASSAY OF MAGNESIUM: CPT

## 2025-07-04 PROCEDURE — 2500000003 HC RX 250 WO HCPCS: Performed by: INTERNAL MEDICINE

## 2025-07-04 PROCEDURE — 94760 N-INVAS EAR/PLS OXIMETRY 1: CPT

## 2025-07-04 PROCEDURE — 84100 ASSAY OF PHOSPHORUS: CPT

## 2025-07-04 PROCEDURE — 94640 AIRWAY INHALATION TREATMENT: CPT

## 2025-07-04 RX ADMIN — SODIUM CHLORIDE, PRESERVATIVE FREE 10 ML: 5 INJECTION INTRAVENOUS at 08:09

## 2025-07-04 RX ADMIN — CARVEDILOL 6.25 MG: 6.25 TABLET, FILM COATED ORAL at 16:51

## 2025-07-04 RX ADMIN — CARVEDILOL 6.25 MG: 6.25 TABLET, FILM COATED ORAL at 08:04

## 2025-07-04 RX ADMIN — SACUBITRIL AND VALSARTAN 1 TABLET: 24; 26 TABLET, FILM COATED ORAL at 19:40

## 2025-07-04 RX ADMIN — SPIRONOLACTONE 12.5 MG: 25 TABLET ORAL at 08:04

## 2025-07-04 RX ADMIN — SACUBITRIL AND VALSARTAN 1 TABLET: 24; 26 TABLET, FILM COATED ORAL at 08:05

## 2025-07-04 RX ADMIN — DONEPEZIL HYDROCHLORIDE 10 MG: 10 TABLET, FILM COATED ORAL at 19:40

## 2025-07-04 RX ADMIN — ATORVASTATIN CALCIUM 20 MG: 20 TABLET, FILM COATED ORAL at 19:39

## 2025-07-04 RX ADMIN — PANTOPRAZOLE SODIUM 40 MG: 40 TABLET, DELAYED RELEASE ORAL at 07:16

## 2025-07-04 RX ADMIN — SODIUM CHLORIDE 250 MG: 0.9 INJECTION, SOLUTION INTRAVENOUS at 08:12

## 2025-07-04 RX ADMIN — SODIUM CHLORIDE, PRESERVATIVE FREE 10 ML: 5 INJECTION INTRAVENOUS at 19:40

## 2025-07-04 RX ADMIN — ENOXAPARIN SODIUM 40 MG: 100 INJECTION SUBCUTANEOUS at 08:07

## 2025-07-04 RX ADMIN — ASPIRIN 81 MG: 81 TABLET, COATED ORAL at 08:04

## 2025-07-04 RX ADMIN — IPRATROPIUM BROMIDE AND ALBUTEROL SULFATE 1 DOSE: 2.5; .5 SOLUTION RESPIRATORY (INHALATION) at 02:39

## 2025-07-04 RX ADMIN — IPRATROPIUM BROMIDE AND ALBUTEROL SULFATE 1 DOSE: 2.5; .5 SOLUTION RESPIRATORY (INHALATION) at 22:12

## 2025-07-04 RX ADMIN — PAROXETINE HYDROCHLORIDE 20 MG: 10 TABLET, FILM COATED ORAL at 08:04

## 2025-07-04 RX ADMIN — WATER 2000 MG: 1 INJECTION INTRAMUSCULAR; INTRAVENOUS; SUBCUTANEOUS at 09:24

## 2025-07-04 RX ADMIN — POTASSIUM CHLORIDE 40 MEQ: 1500 TABLET, EXTENDED RELEASE ORAL at 09:41

## 2025-07-04 RX ADMIN — IPRATROPIUM BROMIDE AND ALBUTEROL SULFATE 1 DOSE: 2.5; .5 SOLUTION RESPIRATORY (INHALATION) at 06:02

## 2025-07-04 RX ADMIN — IPRATROPIUM BROMIDE AND ALBUTEROL SULFATE 1 DOSE: 2.5; .5 SOLUTION RESPIRATORY (INHALATION) at 18:59

## 2025-07-04 RX ADMIN — ACETAMINOPHEN 650 MG: 325 TABLET ORAL at 19:51

## 2025-07-04 RX ADMIN — IPRATROPIUM BROMIDE AND ALBUTEROL SULFATE 1 DOSE: 2.5; .5 SOLUTION RESPIRATORY (INHALATION) at 10:04

## 2025-07-04 RX ADMIN — IPRATROPIUM BROMIDE AND ALBUTEROL SULFATE 1 DOSE: 2.5; .5 SOLUTION RESPIRATORY (INHALATION) at 14:14

## 2025-07-04 RX ADMIN — CLOPIDOGREL BISULFATE 75 MG: 75 TABLET, FILM COATED ORAL at 08:04

## 2025-07-04 RX ADMIN — Medication 1 CAPSULE: at 08:04

## 2025-07-04 ASSESSMENT — ENCOUNTER SYMPTOMS
VOICE CHANGE: 0
CONSTIPATION: 0
SHORTNESS OF BREATH: 0
DIARRHEA: 0
COLOR CHANGE: 0
NAUSEA: 0
BACK PAIN: 0
VOMITING: 0

## 2025-07-04 ASSESSMENT — PAIN DESCRIPTION - DESCRIPTORS
DESCRIPTORS: ACHING
DESCRIPTORS: ACHING

## 2025-07-04 ASSESSMENT — PAIN SCALES - GENERAL
PAINLEVEL_OUTOF10: 4
PAINLEVEL_OUTOF10: 2

## 2025-07-04 ASSESSMENT — PAIN DESCRIPTION - LOCATION
LOCATION: NECK
LOCATION: NECK

## 2025-07-04 ASSESSMENT — PAIN - FUNCTIONAL ASSESSMENT: PAIN_FUNCTIONAL_ASSESSMENT: ACTIVITIES ARE NOT PREVENTED

## 2025-07-04 ASSESSMENT — PAIN DESCRIPTION - PAIN TYPE: TYPE: CHRONIC PAIN

## 2025-07-04 ASSESSMENT — PAIN DESCRIPTION - FREQUENCY: FREQUENCY: INTERMITTENT

## 2025-07-04 ASSESSMENT — PAIN DESCRIPTION - ORIENTATION: ORIENTATION: RIGHT;LEFT;MID

## 2025-07-04 ASSESSMENT — PAIN DESCRIPTION - ONSET: ONSET: ON-GOING

## 2025-07-04 NOTE — PROGRESS NOTES
[facility] Neurology Progress Note      Patient:   Fernando Chu  MR#:    [id number]   Room:    [facility]/311-02   YOB: 1945  Date of Progress Note: 7/4/2025  Time of Note                           12:22 PM  Consulting Physician:  Jhonny Garza DO  Attending Physician:  Thom Sofia DO      INTERVAL HISTORY:  No acute events overnight, no new focal complaints, no further syncopal events noted, doing about the same.     REVIEW OF SYSTEMS:  Constitutional: No fevers No chills  Neck:No stiffness  Respiratory: No shortness of breath  Cardiovascular: No chest pain No palpitations  Gastrointestinal: No abdominal pain    Genitourinary: No Dysuria  Neurological: No headache, no confusion    PHYSICAL EXAM:    Constitutional -   BP (!) 135/93   Pulse 71   Temp 98.1 °F (36.7 °C) (Temporal)   Resp 16   Ht 1.753 m (5' 9.02\")   Wt 75.8 kg (167 lb 3.2 oz)   SpO2 97%   BMI 24.68 kg/m²   General appearance: No acute distress   EYES -   Conjunctiva normal  Pupillary exam as below, see CN exam in the neurologic exam  ENT-    No scars, masses, or lesions over external nose or ears  Oropharynx without erythema, palate midline  Cardiovascular -   No clubbing, cyanosis, or edema   Pulmonary-   Good expansion, normal effort without use of accessory muscles  Musculoskeletal -   No significant wasting of muscles noted  Gait as below, see gait exam in the neurologic exam  Muscle strength, tone, stability as below see the motor exam in the neurologic exam.   No bony deformities  Skin -   Warm, dry, and intact to inspection and palpation.    No rash, erythema, or pallor  Psychiatric -   Mood, affect, and behavior appear normal    Memory as below see mental status examination in the neurologic exam        NEUROLOGICAL EXAM     Mental status    [x] Awake, alert, oriented   [x] Affect attention and concentration appear appropriate  [x] Recent and remote memory appears unremarkable  [x] Speech normal without

## 2025-07-04 NOTE — PROGRESS NOTES
Hospitalist Progress Note    Patient:  Fernando Chu  YOB: 1945  Date of Service: 7/4/2025  MRN: [id number]   Acct: [id number]   Primary Care Physician: Misty Blas APRN  Advance Directive: Full Code  Admit Date: 6/30/2025       Hospital Day: 4  Referring Provider: Thom Sofia DO    Patient Seen, Chart, Consults, Notes, Labs, Radiology studies reviewed.    Subjective:  Fernando Chu is a 80 y.o. male  whom we are following for syncope, NSTEMI, hypertension.  He continues to do well.  He has been up ambulating.  He is feeling much better.  His color is improved.  Follow-up blood cultures are no growth thus far.  His bacteremia has been identified as Streptococcus mitis.  We will de-escalate his antibiotics to ceftriaxone for coverage.    Allergies:  Demerol hcl [meperidine]    Medicines:  Current Facility-Administered Medications   Medication Dose Route Frequency Provider Last Rate Last Admin    cefTRIAXone (ROCEPHIN) 2,000 mg in sterile water 20 mL IV syringe  2,000 mg IntraVENous Q24H Thom Sofia DO   2,000 mg at 07/04/25 0924    donepezil (ARICEPT) tablet 10 mg  10 mg Oral Nightly Thom Sofia DO   10 mg at 07/03/25 2121    sacubitril-valsartan (ENTRESTO) 24-26 MG per tablet 1 tablet  1 tablet Oral BID Thom Sofia DO   1 tablet at 07/04/25 0805    spironolactone (ALDACTONE) tablet 12.5 mg  12.5 mg Oral Daily Zenon James MD   12.5 mg at 07/04/25 0804    ferric gluconate (FERRLECIT) 250 mg in sodium chloride 0.9 % 250 mL IVPB  250 mg IntraVENous Daily Thom Sofia DO   Stopped at 07/04/25 1014    epoetin trista-epbx (RETACRIT) injection 10,000 Units  10,000 Units SubCUTAneous Weekly Thom Sofia DO   10,000 Units at 07/02/25 1757    carvedilol (COREG) tablet 6.25 mg  6.25 mg Oral BID WC Zenon James MD   6.25 mg at 07/04/25 0804    ipratropium 0.5 mg-albuterol 2.5 mg (DUONEB) nebulizer solution 1 Dose  1 Dose Inhalation Q4H  tablet 20 mg  20 mg Oral Daily Thom Sofia, DO   20 mg at 25 0804       Past Medical History:  Past Medical History:   Diagnosis Date    Cancer (HCC) 2011    colon    GERD (gastroesophageal reflux disease)     Hyperlipidemia     Hypertension     Iron deficiency anemia secondary to inadequate dietary iron intake 2024    Iron malabsorption 2024    Pancreatitis     Port-A-Cath in place     Mediport. has been removed       Past Surgical History:  Past Surgical History:   Procedure Laterality Date    CARDIAC PROCEDURE N/A 10/10/2024    Left heart cath / coronary angiography performed by Ronan Carr MD at [facility] CARDIAC CATH LAB    CARDIAC PROCEDURE N/A 10/10/2024    Percutaneous coronary intervention performed by Ronan Carr MD at [facility] CARDIAC CATH LAB    CARDIAC PROCEDURE N/A 2024    Left heart cath / coronary angiography performed by Ronan Carr MD at [facility] CARDIAC CATH LAB    CARDIAC PROCEDURE N/A 2024    Percutaneous coronary intervention performed by Ronan Carr MD at [facility] CARDIAC CATH LAB    COLON SURGERY  2010    resection CA    COLONOSCOPY  2013    Dr. Odonnell    COLONOSCOPY  2016    Dr. Odonnell    FRACTURE SURGERY  1990    left foot    HERNIA REPAIR  11/15/2004    Dr. Valdez    KIDNEY STONE SURGERY      OTHER SURGICAL HISTORY      umbilical surgery when he was 11    OTHER SURGICAL HISTORY      SBO       Family History  Family History   Problem Relation Age of Onset    Cancer Mother         breast    Heart Disease Father     Heart Disease Brother        Social History  Social History     Socioeconomic History    Marital status:      Spouse name: Not on file    Number of children: Not on file    Years of education: Not on file    Highest education level: Not on file   Occupational History    Not on file   Tobacco Use    Smoking status: Former     Current packs/day: 0.00     Types: Cigarettes     Quit date: 1990     Years since quittin.5

## 2025-07-04 NOTE — PROGRESS NOTES
Cardiology Progress Note   Zenon James MD      Patient:  Fernando Chu  [id number]  Admit Date: 6/30/2025       Hospital Day: 3  Referring Provider: Thom Sofia DO    Admission Problem List: Present on Admission:   Syncope and collapse   Elevated troponin   Fever   Elevated CK        Subjective     Mr. Chu is clinically feeling better  No further shaking      Objective      BP (!) 116/57   Pulse 70   Temp 98.1 °F (36.7 °C) (Temporal)   Resp 16   Ht 1.753 m (5' 9.02\")   Wt 75.8 kg (167 lb 3.2 oz)   SpO2 95%   BMI 24.68 kg/m²       Intake/Output Summary (Last 24 hours) at 7/3/2025 2302  Last data filed at 7/3/2025 1035  Gross per 24 hour   Intake 480 ml   Output --   Net 480 ml         Physical Exam:      Physical Exam  Vitals and nursing note reviewed.   Constitutional:       Appearance: Normal appearance.   HENT:      Head: Normocephalic and atraumatic.   Eyes:      Extraocular Movements: Extraocular movements intact.   Cardiovascular:      Rate and Rhythm: Normal rate and regular rhythm.      Heart sounds: Murmur heard.   Pulmonary:      Effort: Pulmonary effort is normal.      Breath sounds: Normal breath sounds.   Abdominal:      Palpations: Abdomen is soft.   Musculoskeletal:      Right lower leg: No edema.      Left lower leg: No edema.   Skin:     General: Skin is warm and dry.   Neurological:      General: No focal deficit present.      Mental Status: He is alert.   Psychiatric:         Mood and Affect: Mood normal.           Lab Data:  CBC:   Recent Labs     07/01/25  0137 07/01/25  1627 07/02/25  0108   WBC 6.7  --  6.7   HGB 7.4* 7.4* 7.4*   HCT 22.8* 21.8* 22.9*   MCV 92.7  --  89.8     --  201     BMP:   Recent Labs     07/01/25  0137 07/02/25  0108    139   K 3.8  4.2 3.8   * 109*   CO2 14* 21*   BUN 20 24*   CREATININE 1.0 1.0     LIVER PROFILE:   Recent Labs     07/01/25  0137   AST 67*   ALT 28   BILITOT 0.7   ALKPHOS 129     PT/INR: No results for   Mild (<50%) stenosis in the left internal carotid artery.  Heterogeneous plaque in the left internal carotid artery.   Normal antegrade flow involving the right vertebral artery.   Normal antegrade flow involving the left vertebral artery.     Echo (TTE) complete (PRN contrast/bubble/strain/3D)  Result Date: 7/2/2025    Left Ventricle: Mildly reduced left ventricular systolic function with a visually estimated EF of 40 - 45%. EF by visual approximation is 40%. Left ventricle is mildly dilated. Moderately increased wall thickness. See diagram for wall motion findings. Grade II diastolic dysfunction with increased LAP.   Right Ventricle: Right ventricle is mildly dilated. Normal systolic function. TAPSE is normal.   Aortic Valve: Mild regurgitation.   Mitral Valve: Mildly thickened leaflets. Mild regurgitation.   Tricuspid Valve: Mild regurgitation. Mildly elevated RVSP, consistent with mild pulmonary hypertension.   Left Atrium: Left atrium is moderately dilated. Left atrial volume index is moderately increased (42-48 mL/m2) mL/m2.   Interatrial Septum: Agitated saline study was negative with and without provocation.   Pericardium: Small (<1 cm) pericardial effusion present.   Image quality is adequate. Contrast used: Lumason.     MRI BRAIN W WO CONTRAST  Result Date: 7/2/2025  EXAM:  MRI OF THE BRAIN WITHOUT AND WITH CONTRAST  HISTORY:  Syncope  TECHNIQUE:  Multiplanar imaging of the brain was performed using T1, T2, inversion recovery, diffusion, susceptibility, postcontrast T1W MPRAGE sequences.  Comparison none.  FINDINGS:  There is no restricted diffusion.  The lateral ventricles and cortical sulci are mildly prominent secondary to atrophy.  Numerous T2 high signal foci are seen throughout the supratentorial white matter.  Old lacunar type type infarcts are seen  within the basal ganglia. No acute hemorrhages are seen.  There is no mass effect.  There are no extraaxial collections. No abnormal enhancement is

## 2025-07-05 VITALS
HEIGHT: 69 IN | OXYGEN SATURATION: 100 % | DIASTOLIC BLOOD PRESSURE: 77 MMHG | BODY MASS INDEX: 24.76 KG/M2 | SYSTOLIC BLOOD PRESSURE: 149 MMHG | HEART RATE: 75 BPM | TEMPERATURE: 98.4 F | RESPIRATION RATE: 16 BRPM | WEIGHT: 167.2 LBS

## 2025-07-05 PROBLEM — R74.8 ELEVATED CK: Status: RESOLVED | Noted: 2025-06-30 | Resolved: 2025-07-05

## 2025-07-05 PROBLEM — R79.89 ELEVATED TROPONIN: Status: RESOLVED | Noted: 2025-06-30 | Resolved: 2025-07-05

## 2025-07-05 PROBLEM — R55 SYNCOPE AND COLLAPSE: Status: RESOLVED | Noted: 2025-06-30 | Resolved: 2025-07-05

## 2025-07-05 PROBLEM — R50.9 FEVER: Status: RESOLVED | Noted: 2025-06-30 | Resolved: 2025-07-05

## 2025-07-05 LAB
ANION GAP SERPL CALCULATED.3IONS-SCNC: 11 MMOL/L (ref 8–16)
BUN SERPL-MCNC: 11 MG/DL (ref 8–23)
CALCIUM SERPL-MCNC: 8.5 MG/DL (ref 8.8–10.2)
CHLORIDE SERPL-SCNC: 111 MMOL/L (ref 98–107)
CO2 SERPL-SCNC: 21 MMOL/L (ref 22–29)
CREAT SERPL-MCNC: 1 MG/DL (ref 0.7–1.2)
GLUCOSE SERPL-MCNC: 122 MG/DL (ref 70–99)
POTASSIUM SERPL-SCNC: 3.9 MMOL/L (ref 3.5–5.1)
SODIUM SERPL-SCNC: 143 MMOL/L (ref 136–145)

## 2025-07-05 PROCEDURE — 94640 AIRWAY INHALATION TREATMENT: CPT

## 2025-07-05 PROCEDURE — 6370000000 HC RX 637 (ALT 250 FOR IP): Performed by: INTERNAL MEDICINE

## 2025-07-05 PROCEDURE — 6360000002 HC RX W HCPCS: Performed by: INTERNAL MEDICINE

## 2025-07-05 PROCEDURE — 2500000003 HC RX 250 WO HCPCS: Performed by: INTERNAL MEDICINE

## 2025-07-05 PROCEDURE — 97116 GAIT TRAINING THERAPY: CPT

## 2025-07-05 PROCEDURE — 99232 SBSQ HOSP IP/OBS MODERATE 35: CPT | Performed by: PSYCHIATRY & NEUROLOGY

## 2025-07-05 PROCEDURE — 97161 PT EVAL LOW COMPLEX 20 MIN: CPT

## 2025-07-05 PROCEDURE — 94150 VITAL CAPACITY TEST: CPT

## 2025-07-05 PROCEDURE — 80048 BASIC METABOLIC PNL TOTAL CA: CPT

## 2025-07-05 PROCEDURE — 36415 COLL VENOUS BLD VENIPUNCTURE: CPT

## 2025-07-05 PROCEDURE — 2580000003 HC RX 258: Performed by: INTERNAL MEDICINE

## 2025-07-05 PROCEDURE — 94760 N-INVAS EAR/PLS OXIMETRY 1: CPT

## 2025-07-05 RX ORDER — VALSARTAN 40 MG/1
40 TABLET ORAL 2 TIMES DAILY
Qty: 30 TABLET | Refills: 3 | Status: SHIPPED | OUTPATIENT
Start: 2025-07-05

## 2025-07-05 RX ORDER — VALSARTAN 40 MG/1
40 TABLET ORAL 2 TIMES DAILY
Status: DISCONTINUED | OUTPATIENT
Start: 2025-07-05 | End: 2025-07-05 | Stop reason: HOSPADM

## 2025-07-05 RX ORDER — SPIRONOLACTONE 25 MG/1
12.5 TABLET ORAL DAILY
Qty: 30 TABLET | Refills: 3 | Status: SHIPPED | OUTPATIENT
Start: 2025-07-06

## 2025-07-05 RX ORDER — CARVEDILOL 6.25 MG/1
6.25 TABLET ORAL 2 TIMES DAILY WITH MEALS
Qty: 60 TABLET | Refills: 3 | Status: SHIPPED | OUTPATIENT
Start: 2025-07-05

## 2025-07-05 RX ADMIN — PAROXETINE HYDROCHLORIDE 20 MG: 10 TABLET, FILM COATED ORAL at 07:51

## 2025-07-05 RX ADMIN — SPIRONOLACTONE 12.5 MG: 25 TABLET ORAL at 07:51

## 2025-07-05 RX ADMIN — SODIUM CHLORIDE 250 MG: 0.9 INJECTION, SOLUTION INTRAVENOUS at 07:56

## 2025-07-05 RX ADMIN — IPRATROPIUM BROMIDE AND ALBUTEROL SULFATE 1 DOSE: 2.5; .5 SOLUTION RESPIRATORY (INHALATION) at 06:10

## 2025-07-05 RX ADMIN — Medication 1 CAPSULE: at 07:51

## 2025-07-05 RX ADMIN — SACUBITRIL AND VALSARTAN 1 TABLET: 24; 26 TABLET, FILM COATED ORAL at 07:51

## 2025-07-05 RX ADMIN — CLOPIDOGREL BISULFATE 75 MG: 75 TABLET, FILM COATED ORAL at 07:52

## 2025-07-05 RX ADMIN — PANTOPRAZOLE SODIUM 40 MG: 40 TABLET, DELAYED RELEASE ORAL at 07:51

## 2025-07-05 RX ADMIN — IPRATROPIUM BROMIDE AND ALBUTEROL SULFATE 1 DOSE: 2.5; .5 SOLUTION RESPIRATORY (INHALATION) at 02:16

## 2025-07-05 RX ADMIN — IPRATROPIUM BROMIDE AND ALBUTEROL SULFATE 1 DOSE: 2.5; .5 SOLUTION RESPIRATORY (INHALATION) at 10:18

## 2025-07-05 RX ADMIN — ENOXAPARIN SODIUM 40 MG: 100 INJECTION SUBCUTANEOUS at 07:52

## 2025-07-05 RX ADMIN — ASPIRIN 81 MG: 81 TABLET, COATED ORAL at 07:51

## 2025-07-05 RX ADMIN — CARVEDILOL 6.25 MG: 6.25 TABLET, FILM COATED ORAL at 07:51

## 2025-07-05 RX ADMIN — WATER 2000 MG: 1 INJECTION INTRAMUSCULAR; INTRAVENOUS; SUBCUTANEOUS at 07:57

## 2025-07-05 NOTE — DISCHARGE SUMMARY
Discharge Summary    Fernando Chu  :  1945  MRN:  [id number]    Admit date:  2025  Discharge date: 2025     Admitting Physician:  Thom Sofia DO    Advance Directive: Full Code    Consults: cardiology and neurology    Primary Care Physician:  Misty Blas, LAKEISHA    Discharge Diagnoses:  Principal Problem (Resolved):    Syncope and collapse  Active Problems:    * No active hospital problems. *  Resolved Problems:    Elevated troponin    Fever    Elevated CK      Significant Diagnostic Studies:   Vascular duplex carotid bilateral  Result Date: 7/3/2025    Mild (<50%) stenosis in the right internal carotid artery.  Heterogeneous plaque in the right internal carotid artery.   Mild (<50%) stenosis in the left internal carotid artery.  Heterogeneous plaque in the left internal carotid artery.   Normal antegrade flow involving the right vertebral artery.   Normal antegrade flow involving the left vertebral artery.     Echo (TTE) complete (PRN contrast/bubble/strain/3D)  Result Date: 2025    Left Ventricle: Mildly reduced left ventricular systolic function with a visually estimated EF of 40 - 45%. EF by visual approximation is 40%. Left ventricle is mildly dilated. Moderately increased wall thickness. See diagram for wall motion findings. Grade II diastolic dysfunction with increased LAP.   Right Ventricle: Right ventricle is mildly dilated. Normal systolic function. TAPSE is normal.   Aortic Valve: Mild regurgitation.   Mitral Valve: Mildly thickened leaflets. Mild regurgitation.   Tricuspid Valve: Mild regurgitation. Mildly elevated RVSP, consistent with mild pulmonary hypertension.   Left Atrium: Left atrium is moderately dilated. Left atrial volume index is moderately increased (42-48 mL/m2) mL/m2.   Interatrial Septum: Agitated saline study was negative with and without provocation.   Pericardium: Small (<1 cm) pericardial effusion present.   Image quality is adequate. Contrast used:

## 2025-07-05 NOTE — PLAN OF CARE
Problem: ABCDS Injury Assessment  Goal: Absence of physical injury  7/4/2025 2316 by Luisa Robin RN  Outcome: Progressing  7/4/2025 1054 by Yani Rosenthal RN  Outcome: Progressing  Flowsheets (Taken 7/4/2025 0519 by Eva Lees LPN)  Absence of Physical Injury: Implement safety measures based on patient assessment     Problem: Discharge Planning  Goal: Discharge to home or other facility with appropriate resources  7/4/2025 2316 by Luisa Robin RN  Outcome: Progressing  7/4/2025 1054 by Yani Rosenthal RN  Outcome: Progressing  Flowsheets (Taken 7/4/2025 0519 by Eva Lees LPN)  Discharge to home or other facility with appropriate resources: Identify barriers to discharge with patient and caregiver

## 2025-07-05 NOTE — PROGRESS NOTES
Physical Therapy    Facility/Department: [facility] TAMMY/VAS/MED  Physical Therapy Initial Assessment    Name: Fernando Chu  : 1945  MRN: [id number]  Date of Service: 2025    Discharge Recommendations:  Home with assist PRN          Patient Diagnosis(es): The primary encounter diagnosis was Syncope, unspecified syncope type. Diagnoses of TIA (transient ischemic attack) and Palpitations were also pertinent to this visit.  Past Medical History:  has a past medical history of Cancer (HCC), GERD (gastroesophageal reflux disease), Hyperlipidemia, Hypertension, Iron deficiency anemia secondary to inadequate dietary iron intake, Iron malabsorption, Pancreatitis, and Port-A-Cath in place.  Past Surgical History:  has a past surgical history that includes fracture surgery (1990); hernia repair (11/15/2004); Colon surgery (2010); Colonoscopy (2013); Colonoscopy (2016); other surgical history; Cardiac procedure (N/A, 10/10/2024); Cardiac procedure (N/A, 10/10/2024); Cardiac procedure (N/A, 2024); Cardiac procedure (N/A, 2024); Kidney stone surgery; and other surgical history.    Assessment   Body Structures, Functions, Activity Limitations Requiring Skilled Therapeutic Intervention: Decreased functional mobility   Assessment: Evaluation only  Therapy Prognosis: Good  Decision Making: Low Complexity  No Skilled PT: At baseline function  Requires PT Follow-Up: No  Activity Tolerance  Activity Tolerance: Patient tolerated evaluation without incident     Plan   Safety Devices  Type of Devices: Call light within reach, Gait belt, Left in chair, Nurse notified     Restrictions  Restrictions/Precautions  Restrictions/Precautions: Fall Risk     Subjective   General  Chart Reviewed: Yes  Patient assessed for rehabilitation services?: Yes  Diagnosis: Syncope  Follows Commands: Within Functional Limits  Subjective  Subjective: Agrees to work with therapy         Social/Functional

## 2025-07-05 NOTE — CARE COORDINATION
07/05/25 1103   IMM Letter   IMM Letter given to Patient/Family/Significant other/Guardian/POA/by: Mary Segovia   IMM Letter date given: 07/05/25   IMM Letter time given: 1052 2nd Important Message from Medicare letter given to  Fernando Chu  by Mary Segovia. All questions answered,  Fernando Chu  voiced understanding. Signed copy of IMM placed in patient's soft chart. Fernando Chu given a copy of the IMM.

## 2025-07-05 NOTE — PROGRESS NOTES
[facility] Neurology Progress Note      Patient:   Fernando Chu  MR#:    [id number]   Room:    [facility]/311-02   YOB: 1945  Date of Progress Note: 7/5/2025  Time of Note                           11:25 AM  Consulting Physician:  Jhonny Garza DO  Attending Physician:  Thom Sofia DO      INTERVAL HISTORY:  No acute events overnight, no new focal complaints, no further syncopal events noted.     REVIEW OF SYSTEMS:  Constitutional: No fevers No chills  Neck:No stiffness  Respiratory: No shortness of breath  Cardiovascular: No chest pain No palpitations  Gastrointestinal: No abdominal pain    Genitourinary: No Dysuria  Neurological: No headache, no confusion    PHYSICAL EXAM:    Constitutional -   BP (!) 149/77   Pulse 75   Temp 98.4 °F (36.9 °C) (Temporal)   Resp 16   Ht 1.753 m (5' 9.02\")   Wt 75.8 kg (167 lb 3.2 oz)   SpO2 100%   BMI 24.68 kg/m²   General appearance: No acute distress   EYES -   Conjunctiva normal  Pupillary exam as below, see CN exam in the neurologic exam  ENT-    No scars, masses, or lesions over external nose or ears  Oropharynx without erythema, palate midline  Cardiovascular -   No clubbing, cyanosis, or edema   Pulmonary-   Good expansion, normal effort without use of accessory muscles  Musculoskeletal -   No significant wasting of muscles noted  Gait as below, see gait exam in the neurologic exam  Muscle strength, tone, stability as below see the motor exam in the neurologic exam.   No bony deformities  Skin -   Warm, dry, and intact to inspection and palpation.    No rash, erythema, or pallor  Psychiatric -   Mood, affect, and behavior appear normal    Memory as below see mental status examination in the neurologic exam        NEUROLOGICAL EXAM     Mental status    [x] Awake, alert, oriented   [x] Affect attention and concentration appear appropriate  [x] Recent and remote memory appears unremarkable  [x] Speech normal without dysarthria or aphasia,  identified within the brain on postcontrast images.  The craniocervical junction and midline structures are normal.  The soft tissues of the skull base and nasopharynx appear normal.  The paranasal sinuses and mastoid air cells are clear.      There is no acute cerebral infarction.  Mild to moderate chronic small vessel ischemic changes seen within the supratentorial white matter.   ______________________________________ Electronically signed by: MARGAUX DEE M.D. Date:     07/02/2025 Time:    06:57     XR CHEST PORTABLE  Result Date: 7/1/2025  EXAM:  FRONTAL VIEW OF THE CHEST.  HISTORY:  New onset shortness of breath.  COMPARISON:  None.  FINDINGS: Aortic calcifications.  Normal heart size.  No acute consolidation.  No visible pleural effusion or pneumothorax.  No acute osseous abnormality. Degenerative changes of the spine and shoulders. Bones appear demineralized.       No acute finding in the chest.     ______________________________________ Electronically signed by: JORDANA DOUGLASS M.D. Date:     07/01/2025 Time:    02:38       Lab Results   Component Value Date    WBC 8.8 07/04/2025    HGB 7.9 (L) 07/04/2025    HCT 24.1 (L) 07/04/2025    MCV 88.6 07/04/2025     07/04/2025     Lab Results   Component Value Date     07/05/2025    K 3.9 07/05/2025     (H) 07/05/2025    CO2 21 (L) 07/05/2025    BUN 11 07/05/2025    CREATININE 1.0 07/05/2025    GLUCOSE 122 (H) 07/05/2025    CALCIUM 8.5 (L) 07/05/2025    BILITOT 0.6 07/04/2025    ALKPHOS 178 (H) 07/04/2025    AST 39 07/04/2025    ALT 30 07/04/2025    LABGLOM 76 07/05/2025    GFRAA >59 08/17/2022   No results found for: \"INR\", \"PROTIME\"    RECORD REVIEW:   Previous medical records, medications were reviewed at today's visit.  Nursing/physician notes, imaging, labs and vitals reviewed. PT,OT and/or speech notes reviewed      ASSESSMENT:  80 y.o. admitted with a syncopal event, elevated troponin, tremulousness, mild rhabdomyolysis, possible sepsis. TIA,

## 2025-07-05 NOTE — PROGRESS NOTES
PHYSICAL THERAPY  Daily Treatment Note    DATE:  2025  NAME:  Fernando Chu  :  1945  (80 y.o.,male)  MRN:  [id number]      Subjective  General  Chart Reviewed: Yes  Patient assessed for rehabilitation services?: Yes  Diagnosis: Syncope  Follows Commands: Within Functional Limits  Subjective  Subjective: Agrees to work with therapy          PAIN    [x] No Pain    [] Patient rates pain at   / 10 - Nursing was notified    HOME LIVING:  Social/Functional History  Lives With: Spouse  Type of Home: House  Home Layout: One level  Home Access: Stairs to enter with rails  Entrance Stairs - Number of Steps: 3  Bathroom Shower/Tub: Tub/Shower unit  Home Equipment: Rollator (was his wifes)  Prior Level of Assist for ADLs: Independent  Prior Level of Assist for Homemaking: Independent  Prior Level of Assist for Ambulation: Independent household ambulator, with or without device  Prior Level of Assist for Transfers: Independent  Active : Yes    RESTRICTIONS/PRECAUTIONS:    Restrictions/Precautions  Restrictions/Precautions: Fall Risk    OVERALL  ORIENTATION STATUS:  Overall Orientation Status: Within Functional Limits    Objective  ROM  PROM RLE (degrees)  RLE PROM: WFL  PROM LLE (degrees)  LLE PROM: WFL    STRENGTH  Strength RLE  Strength RLE: WFL  Strength LLE  Strength LLE: WFL    BED MOBILITY  Bed mobility  Supine to Sit: Modified independent  Sit to Supine: Modified independent  Scooting: Modified independent    TRANSFERS  Transfers  Sit to Stand: Modified independent  Stand to Sit: Modified independent  Bed to Chair: Modified independent    AMBULATION  Ambulation  Device: No Device  Assistance: Modified Independent  Quality of Gait: Safe  Distance: 200 feet    BALANCE  Balance  Posture: Good  Sitting - Static: Good  Sitting - Dynamic: Good  Standing - Static: Good    STAIRS       TREATMENT FOCUS THIS VISIT    [x] Gait training  [x] Transfer training  [x] Bed mobility  [x] Safety and education  [] The

## 2025-07-07 ENCOUNTER — TELEPHONE (OUTPATIENT)
Age: 80
End: 2025-07-07

## 2025-07-07 RX ORDER — LEVOCETIRIZINE DIHYDROCHLORIDE 5 MG/1
5 TABLET, FILM COATED ORAL NIGHTLY
Qty: 90 TABLET | Refills: 3 | Status: SHIPPED | OUTPATIENT
Start: 2025-07-07

## 2025-07-07 RX ORDER — ATORVASTATIN CALCIUM 20 MG/1
20 TABLET, FILM COATED ORAL NIGHTLY
Qty: 90 TABLET | Refills: 3 | Status: SHIPPED | OUTPATIENT
Start: 2025-07-07

## 2025-07-07 NOTE — TELEPHONE ENCOUNTER
Received call/My Chart Message from patient requesting refill on medication(s). Pt was last seen in office on 4/15/2025  and has a follow up scheduled for 7/10/2025. Will send request to provider for authorization.     Requested Prescriptions     Pending Prescriptions Disp Refills    atorvastatin (LIPITOR) 20 MG tablet 90 tablet 3     Sig: Take 1 tablet by mouth nightly    levocetirizine (XYZAL ALLERGY 24HR) 5 MG tablet 90 tablet 3     Sig: Take 1 tablet by mouth nightly

## 2025-07-07 NOTE — TELEPHONE ENCOUNTER
Care Transitions Initial Follow Up Call    Outreach made within 2 business days of discharge: Yes    Patient: Fernando Chu Patient : 1945   MRN: [id number]  Reason for Admission: NSTEMI  Discharge Date: 25       Spoke with: Called pt, left voicemail.     Discharge department/facility: [facility]     Follow Up  Future Appointments   Date Time Provider Department Center   2025  9:15 AM Zenon James MD N [facility]io [place]   8/15/2025  7:00 AM Misty Blas APRN [facility] MAR  [facility] DEP       Phillip Tilley MA

## 2025-07-07 NOTE — TELEPHONE ENCOUNTER
Care Transitions Initial Follow Up Call    Outreach made within 2 business days of discharge: Yes    Patient: Fernando Chu Patient : 1945   MRN: [id number]  Reason for Admission: Syncope and collapse   Discharge Date: 25       Spoke with: Daughter    Discharge department/facility: [facility] Interactive Patient Contact:  Was patient able to fill all prescriptions: Yes  Was patient instructed to bring all medications to the follow-up visit: Yes  Is patient taking all medications as directed in the discharge summary? Yes  Does patient understand their discharge instructions: Yes  Does patient have questions or concerns that need addressed prior to 7-14 day follow up office visit: No    Additional needs identified to be addressed with provider  Patient's daughter stated he needed a couple medication refills. I will send these in a separate encounter. She is trying to straighten out patient medications.              Scheduled appointment with PCP within 7-14 days    Follow Up  Future Appointments   Date Time Provider Department Center   7/10/2025  8:30 AM Misty Blas APRN LPS MAR [facility] DEP   2025  9:15 AM Zenon James MD N [facility] Cardio [facility]   8/15/2025  7:00 AM Misty Blas APRN [facility] MAR [facility]       Laura Pacheco MA

## 2025-07-08 ENCOUNTER — OFFICE VISIT (OUTPATIENT)
Dept: NEUROLOGY | Age: 80
End: 2025-07-08
Payer: MEDICARE

## 2025-07-08 VITALS
HEART RATE: 77 BPM | DIASTOLIC BLOOD PRESSURE: 74 MMHG | WEIGHT: 167 LBS | OXYGEN SATURATION: 96 % | BODY MASS INDEX: 24.73 KG/M2 | HEIGHT: 69 IN | SYSTOLIC BLOOD PRESSURE: 128 MMHG | RESPIRATION RATE: 16 BRPM

## 2025-07-08 DIAGNOSIS — R55 SYNCOPE AND COLLAPSE: Primary | ICD-10-CM

## 2025-07-08 LAB
BACTERIA BLD CULT ORG #2: NORMAL
BACTERIA BLD CULT: NORMAL
EKG P AXIS: NORMAL DEGREES
EKG P-R INTERVAL: NORMAL MS
EKG Q-T INTERVAL: 256 MS
EKG QRS DURATION: 102 MS
EKG QTC CALCULATION (BAZETT): 422 MS
EKG T AXIS: 71 DEGREES

## 2025-07-08 PROCEDURE — 1123F ACP DISCUSS/DSCN MKR DOCD: CPT | Performed by: PSYCHIATRY & NEUROLOGY

## 2025-07-08 PROCEDURE — 99213 OFFICE O/P EST LOW 20 MIN: CPT | Performed by: PSYCHIATRY & NEUROLOGY

## 2025-07-08 NOTE — PROGRESS NOTES
Dr. Valdez    KIDNEY STONE SURGERY      OTHER SURGICAL HISTORY      umbilical surgery when he was 11    OTHER SURGICAL HISTORY      [facility]       Recent Hospitalizations  None    Significant Injuries  None    Habits  Fernando Chu reports that he quit smoking about 35 years ago. His smoking use included cigarettes. He has never used smokeless tobacco. He reports that he does not currently use alcohol after a past usage of about 1.0 standard drink of alcohol per week. He reports that he does not use drugs.    Family History   Problem Relation Age of Onset    Cancer Mother         breast    Heart Disease Father     Heart Disease Brother        Social History  Fernando Chu is , lives in [place], [place], and is retired.    Medications:  Current Outpatient Medications   Medication Sig Dispense Refill    atorvastatin (LIPITOR) 20 MG tablet Take 1 tablet by mouth nightly 90 tablet 3    levocetirizine (XYZAL ALLERGY 24HR) 5 MG tablet Take 1 tablet by mouth nightly 90 tablet 3    spironolactone (ALDACTONE) 25 MG tablet Take 0.5 tablets by mouth daily 30 tablet 3    valsartan (DIOVAN) 40 MG tablet Take 1 tablet by mouth 2 times daily 30 tablet 3    carvedilol (COREG) 6.25 MG tablet Take 1 tablet by mouth 2 times daily (with meals) 60 tablet 3    Apoaequorin (PREVAGEN PO) Take by mouth daily      PARoxetine (PAXIL) 20 MG tablet TAKE ONE TABLET BY MOUTH DAILY 30 tablet 11    omeprazole (PRILOSEC) 20 MG delayed release capsule TAKE ONE CAPSULE BY MOUTH EVERY MORNING BEFORE BREAKFAST 30 capsule 5    hydrOXYzine HCl (ATARAX) 25 MG tablet TAKE ONE TABLET BY MOUTH THREE TIMES DAILY 90 tablet 11    Multiple Vitamin (MULTI-VITAMINS) TABS Take by mouth daily      clopidogrel (PLAVIX) 75 MG tablet Take 1 tablet by mouth daily 90 tablet 3    vitamin D (ERGOCALCIFEROL) 1.25 MG (88988 UT) CAPS capsule TAKE ONE CAPSULE BY MOUTH EVERY WEEK 12 capsule 0    B Complex-C (B COMPLEX-VITAMIN C PO) Take by mouth      finasteride

## 2025-07-10 ENCOUNTER — OFFICE VISIT (OUTPATIENT)
Age: 80
End: 2025-07-10

## 2025-07-10 VITALS
SYSTOLIC BLOOD PRESSURE: 118 MMHG | HEART RATE: 68 BPM | BODY MASS INDEX: 24.14 KG/M2 | HEIGHT: 69 IN | OXYGEN SATURATION: 98 % | WEIGHT: 163 LBS | TEMPERATURE: 97 F | DIASTOLIC BLOOD PRESSURE: 80 MMHG

## 2025-07-10 DIAGNOSIS — D50.8 IRON DEFICIENCY ANEMIA SECONDARY TO INADEQUATE DIETARY IRON INTAKE: ICD-10-CM

## 2025-07-10 DIAGNOSIS — Z09 HOSPITAL DISCHARGE FOLLOW-UP: Primary | ICD-10-CM

## 2025-07-10 DIAGNOSIS — I25.2 HISTORY OF NON-ST ELEVATION MYOCARDIAL INFARCTION (NSTEMI): ICD-10-CM

## 2025-07-10 DIAGNOSIS — I50.21 ACUTE SYSTOLIC HEART FAILURE (HCC): ICD-10-CM

## 2025-07-10 DIAGNOSIS — Z09 HOSPITAL DISCHARGE FOLLOW-UP: ICD-10-CM

## 2025-07-10 LAB
ALBUMIN SERPL-MCNC: 3.3 G/DL (ref 3.5–5.2)
ALP SERPL-CCNC: 150 U/L (ref 40–129)
ALT SERPL-CCNC: 13 U/L (ref 10–50)
ANION GAP SERPL CALCULATED.3IONS-SCNC: 8 MMOL/L (ref 8–16)
AST SERPL-CCNC: 18 U/L (ref 10–50)
BASOPHILS # BLD: 0 K/UL (ref 0–0.2)
BASOPHILS NFR BLD: 0.2 % (ref 0–1)
BILIRUB SERPL-MCNC: 0.4 MG/DL (ref 0.2–1.2)
BUN SERPL-MCNC: 20 MG/DL (ref 8–23)
CALCIUM SERPL-MCNC: 9.2 MG/DL (ref 8.8–10.2)
CHLORIDE SERPL-SCNC: 106 MMOL/L (ref 98–107)
CO2 SERPL-SCNC: 25 MMOL/L (ref 22–29)
CREAT SERPL-MCNC: 1.4 MG/DL (ref 0.7–1.2)
EOSINOPHIL # BLD: 0.1 K/UL (ref 0–0.6)
EOSINOPHIL NFR BLD: 0.6 % (ref 0–5)
ERYTHROCYTE [DISTWIDTH] IN BLOOD BY AUTOMATED COUNT: 15.7 % (ref 11.5–14.5)
GLUCOSE SERPL-MCNC: 96 MG/DL (ref 70–99)
HCT VFR BLD AUTO: 29.9 % (ref 42–52)
HGB BLD-MCNC: 9.1 G/DL (ref 14–18)
IMM GRANULOCYTES # BLD: 0.2 K/UL
IRON SATN MFR SERPL: 9 % (ref 20–50)
IRON SERPL-MCNC: 17 UG/DL (ref 59–158)
LYMPHOCYTES # BLD: 0.7 K/UL (ref 1.1–4.5)
LYMPHOCYTES NFR BLD: 6.2 % (ref 20–40)
MCH RBC QN AUTO: 28.8 PG (ref 27–31)
MCHC RBC AUTO-ENTMCNC: 30.4 G/DL (ref 33–37)
MCV RBC AUTO: 94.6 FL (ref 80–94)
MONOCYTES # BLD: 0.4 K/UL (ref 0–0.9)
MONOCYTES NFR BLD: 3.2 % (ref 0–10)
NEUTROPHILS # BLD: 9.9 K/UL (ref 1.5–7.5)
NEUTS SEG NFR BLD: 87.8 % (ref 50–65)
PHOSPHATE SERPL-MCNC: 2.2 MG/DL (ref 2.5–4.5)
PLATELET # BLD AUTO: 298 K/UL (ref 130–400)
PMV BLD AUTO: 9.9 FL (ref 9.4–12.4)
POTASSIUM SERPL-SCNC: 4.4 MMOL/L (ref 3.5–5.1)
PROT SERPL-MCNC: 7.1 G/DL (ref 6.4–8.3)
RBC # BLD AUTO: 3.16 M/UL (ref 4.7–6.1)
SODIUM SERPL-SCNC: 139 MMOL/L (ref 136–145)
TIBC SERPL-MCNC: 188 UG/DL (ref 250–400)
TSH SERPL DL<=0.005 MIU/L-ACNC: 2.25 UIU/ML (ref 0.27–4.2)
VIT B12 SERPL-MCNC: 770 PG/ML (ref 232–1245)
WBC # BLD AUTO: 11.2 K/UL (ref 4.8–10.8)

## 2025-07-10 ASSESSMENT — ENCOUNTER SYMPTOMS
GASTROINTESTINAL NEGATIVE: 1
RESPIRATORY NEGATIVE: 1
EYES NEGATIVE: 1

## 2025-07-10 NOTE — PROGRESS NOTES
Differential; Future  -     Comprehensive Metabolic Panel; Future  -     TSH; Future  Iron deficiency anemia secondary to inadequate dietary iron intake  -     Iron and TIBC; Future  -     Vitamin B12; Future  -     Phosphorus; Future  Acute systolic heart failure (HCC)  History of non-ST elevation myocardial infarction (NSTEMI)      Medical Decision Making: high complexity  Return if symptoms worsen or fail to improve, for Keep follow up as scheduled.    On this date 7/10/2025 I have spent 55 minutes reviewing previous notes, test results and face to face with the patient discussing the diagnosis and importance of compliance with the treatment plan as well as documenting on the day of the visit.       Return if symptoms worsen or fail to improve, for Keep follow up as scheduled.       Subjective   History of Present Illness  The patient is an 80-year-old male here for a hospital follow-up. He was admitted on 06/30/2025 and discharged on 07/05/2025 following a syncopal episode. During his hospitalization, he was diagnosed with a non-STEMI and new-onset systolic dysfunction, with an ejection fraction of 40 to 45 percent. He was started on Entresto and Aldactone. Patient was unable to afford Entresto as the cost was greater than $400 per month.  Blood cultures were positive, likely related to a recent dental cleaning, and he was sent home on oral antibiotics, which he is still finishing. Neurology evaluated him for the syncopal episode, and an EEG and MRI of the brain showed no acute concerns. A slight tremor was noted, raising the possibility of Parkinson's disease, but a follow-up with neurology on 07/08/2025 suggested an essential tremor instead. He was also found to be significantly anemic during his hospital stay and received three or four IV iron infusions. He has not had repeat blood work since his discharge on 07/05/2025.    Syncopal Episode and Hospitalization  He reports feeling better since his discharge

## 2025-07-18 DIAGNOSIS — D50.8 IRON DEFICIENCY ANEMIA SECONDARY TO INADEQUATE DIETARY IRON INTAKE: ICD-10-CM

## 2025-07-18 LAB
ANION GAP SERPL CALCULATED.3IONS-SCNC: 10 MMOL/L (ref 8–16)
BASOPHILS # BLD: 0 K/UL (ref 0–0.2)
BASOPHILS NFR BLD: 0.5 % (ref 0–1)
BUN SERPL-MCNC: 25 MG/DL (ref 8–23)
CALCIUM SERPL-MCNC: 9.2 MG/DL (ref 8.8–10.2)
CHLORIDE SERPL-SCNC: 102 MMOL/L (ref 98–107)
CO2 SERPL-SCNC: 23 MMOL/L (ref 22–29)
CREAT SERPL-MCNC: 1.5 MG/DL (ref 0.7–1.2)
EOSINOPHIL # BLD: 0.1 K/UL (ref 0–0.6)
EOSINOPHIL NFR BLD: 0.8 % (ref 0–5)
ERYTHROCYTE [DISTWIDTH] IN BLOOD BY AUTOMATED COUNT: 16.4 % (ref 11.5–14.5)
GLUCOSE SERPL-MCNC: 114 MG/DL (ref 70–99)
HCT VFR BLD AUTO: 28.1 % (ref 42–52)
HGB BLD-MCNC: 9 G/DL (ref 14–18)
IMM GRANULOCYTES # BLD: 0.1 K/UL
LYMPHOCYTES # BLD: 1.4 K/UL (ref 1.1–4.5)
LYMPHOCYTES NFR BLD: 19.2 % (ref 20–40)
MCH RBC QN AUTO: 29.6 PG (ref 27–31)
MCHC RBC AUTO-ENTMCNC: 32 G/DL (ref 33–37)
MCV RBC AUTO: 92.4 FL (ref 80–94)
MONOCYTES # BLD: 0.6 K/UL (ref 0–0.9)
MONOCYTES NFR BLD: 8.5 % (ref 0–10)
NEUTROPHILS # BLD: 5.1 K/UL (ref 1.5–7.5)
NEUTS SEG NFR BLD: 69.1 % (ref 50–65)
PLATELET # BLD AUTO: 403 K/UL (ref 130–400)
PMV BLD AUTO: 9.3 FL (ref 9.4–12.4)
POTASSIUM SERPL-SCNC: 4.5 MMOL/L (ref 3.5–5.1)
RBC # BLD AUTO: 3.04 M/UL (ref 4.7–6.1)
SODIUM SERPL-SCNC: 135 MMOL/L (ref 136–145)
WBC # BLD AUTO: 7.4 K/UL (ref 4.8–10.8)

## 2025-07-25 DIAGNOSIS — D50.8 IRON DEFICIENCY ANEMIA SECONDARY TO INADEQUATE DIETARY IRON INTAKE: ICD-10-CM

## 2025-07-25 LAB
ANION GAP SERPL CALCULATED.3IONS-SCNC: 10 MMOL/L (ref 8–16)
BUN SERPL-MCNC: 30 MG/DL (ref 8–23)
CALCIUM SERPL-MCNC: 9.4 MG/DL (ref 8.8–10.2)
CHLORIDE SERPL-SCNC: 107 MMOL/L (ref 98–107)
CO2 SERPL-SCNC: 21 MMOL/L (ref 22–29)
CREAT SERPL-MCNC: 1.2 MG/DL (ref 0.7–1.2)
GLUCOSE SERPL-MCNC: 105 MG/DL (ref 70–99)
POTASSIUM SERPL-SCNC: 4.3 MMOL/L (ref 3.5–5.1)
SODIUM SERPL-SCNC: 138 MMOL/L (ref 136–145)

## 2025-08-01 ENCOUNTER — OFFICE VISIT (OUTPATIENT)
Dept: CARDIOLOGY CLINIC | Age: 80
End: 2025-08-01

## 2025-08-01 VITALS
HEART RATE: 78 BPM | BODY MASS INDEX: 24.25 KG/M2 | WEIGHT: 160 LBS | DIASTOLIC BLOOD PRESSURE: 70 MMHG | HEIGHT: 68 IN | SYSTOLIC BLOOD PRESSURE: 124 MMHG | OXYGEN SATURATION: 99 %

## 2025-08-01 DIAGNOSIS — R06.02 SHORTNESS OF BREATH: ICD-10-CM

## 2025-08-01 DIAGNOSIS — I47.10 SVT (SUPRAVENTRICULAR TACHYCARDIA): ICD-10-CM

## 2025-08-01 DIAGNOSIS — I42.9 CARDIOMYOPATHY, UNSPECIFIED TYPE (HCC): Primary | ICD-10-CM

## 2025-08-01 RX ORDER — TAMSULOSIN HYDROCHLORIDE 0.4 MG/1
0.4 CAPSULE ORAL DAILY
COMMUNITY

## 2025-08-02 NOTE — PROGRESS NOTES
07/10/2025    AST 18 07/10/2025    ALT 13 07/10/2025    LABGLOM 61 07/25/2025    GFRAA >59 08/17/2022       BNP: No results found for: \"BNP\"    FASTING LIPID PANEL:  Lab Results   Component Value Date/Time    HDL 56 05/16/2025 01:26 PM    TRIG 47 05/16/2025 01:26 PM     FASTING LIPID PANEL:  Lab Results   Component Value Date/Time    HDL 56 05/16/2025 01:26 PM    TRIG 47 05/16/2025 01:26 PM               Zenon James MD  8/2/2025 7:56 AM    This dictation was generated by voice recognition computer software.  Although all attempts are made to edit the dictation for accuracy, there may be errors in the transcription that are not intended.

## 2025-08-15 ENCOUNTER — OFFICE VISIT (OUTPATIENT)
Age: 80
End: 2025-08-15

## 2025-08-15 VITALS
TEMPERATURE: 97 F | WEIGHT: 163 LBS | DIASTOLIC BLOOD PRESSURE: 76 MMHG | HEIGHT: 68 IN | HEART RATE: 68 BPM | OXYGEN SATURATION: 98 % | BODY MASS INDEX: 24.71 KG/M2 | SYSTOLIC BLOOD PRESSURE: 122 MMHG

## 2025-08-15 DIAGNOSIS — I10 ESSENTIAL HYPERTENSION: ICD-10-CM

## 2025-08-15 DIAGNOSIS — D50.8 IRON DEFICIENCY ANEMIA SECONDARY TO INADEQUATE DIETARY IRON INTAKE: Primary | ICD-10-CM

## 2025-08-15 DIAGNOSIS — I25.2 HISTORY OF NON-ST ELEVATION MYOCARDIAL INFARCTION (NSTEMI): ICD-10-CM

## 2025-08-15 DIAGNOSIS — G25.0 BENIGN ESSENTIAL TREMOR: ICD-10-CM

## 2025-08-15 DIAGNOSIS — I50.22 CHRONIC SYSTOLIC HF (HEART FAILURE) (HCC): ICD-10-CM

## 2025-08-15 ASSESSMENT — ENCOUNTER SYMPTOMS
GASTROINTESTINAL NEGATIVE: 1
EYES NEGATIVE: 1
RESPIRATORY NEGATIVE: 1

## 2025-08-21 ENCOUNTER — TELEPHONE (OUTPATIENT)
Dept: PHARMACY | Facility: CLINIC | Age: 80
End: 2025-08-21

## 2025-08-21 RX ORDER — VALSARTAN 40 MG/1
40 TABLET ORAL 2 TIMES DAILY
Qty: 200 TABLET | Refills: 1 | Status: SHIPPED | OUTPATIENT
Start: 2025-08-21

## (undated) PROCEDURE — 4A10X4Z MONITORING OF CENTRAL NERVOUS ELECTRICAL ACTIVITY, EXTERNAL APPROACH: ICD-10-PCS

## (undated) DEVICE — ENDOSCOPIC SEAL URO 1 SIZE FITS ALL: Brand: ENDOSCOPIC SEAL

## (undated) DEVICE — CATHETER GUID 6FR 0.071IN COR AMPLATZ R 2 MID FLEXIBILITY

## (undated) DEVICE — CATH BLLN ANGIO 4X12MM NC EUPHORIA RX

## (undated) DEVICE — INFLATION DEVICE KIT: Brand: ENCORE™ 26 ADVANTAGE KIT

## (undated) DEVICE — CATH BLLN ANGIO 3.50X15MM NC EUPHORA RX

## (undated) DEVICE — FIBR LASR MOSES 365 DFL 6J 80HZ 120W

## (undated) DEVICE — CATHETER GUID 6FR L100CM DIA0.071IN NYL SHFT EBU4.0 W/O

## (undated) DEVICE — GUIDEWIRE VASC L260CM DIA0038IN TIP L3MM PTFE J TIP FIX COR

## (undated) DEVICE — GLV SURG BIOGEL M LTX PF 7 1/2

## (undated) DEVICE — SYSTEM GWIRE L260CM DIA0035IN STD STEER HYDROPHOBIC STR TIP

## (undated) DEVICE — CATHETER COR DIAG PIGTAILS PIG 145 CRV 5FR 110CM 6 SIDE H

## (undated) DEVICE — HI-TORQUE WHISPER MS GUIDE WIRE .014 STRAIGHT TIP 3.0 CM X 190 CM: Brand: HI-TORQUE WHISPER

## (undated) DEVICE — Device

## (undated) DEVICE — CATH BLLN ANGIO 3X12MM NC EUPHORIA RX

## (undated) DEVICE — TORQUE DEVICE: Brand: TORQUE DEVICE

## (undated) DEVICE — Device: Brand: NOMOLINE™ LH ADULT NASAL CO2 CANNULA WITH O2 4M

## (undated) DEVICE — HI-TORQUE BALANCE MIDDLEWEIGHT UNIVERSAL GUIDE WIRE .014 STRAIGHT TIP 3.0 CM X 190 CM: Brand: HI-TORQUE BALANCE MIDDLEWEIGHT UNIVERSAL

## (undated) DEVICE — CATHETER DIAG 5FR L100CM LUMN ID0.047IN JL4 CRV 0 SIDE H

## (undated) DEVICE — BAND COMPR L24CM REG CLR PLAS HEMSTAT EXT HK AND LOOP RETEN

## (undated) DEVICE — KIT ANGIO W/ AT P65 PREM HND CTRL FOR CNTRST DEL ANGIOTOUCH

## (undated) DEVICE — GLIDESHEATH SS KIT HYDROPHILIC COATED INTRODUCER SHEATH: Brand: GLIDESHEATH

## (undated) DEVICE — KIT MFLD ISOLATN NACL CNTRST PRT TBNG SPIK W/ PRSS TRNSDUC

## (undated) DEVICE — SYR LUERLOK 30CC

## (undated) DEVICE — BAG,DRAINAGE,4L,A/R TOWER,LL,SLIDE TAP: Brand: MEDLINE

## (undated) DEVICE — RADIFOCUS OPTITORQUE ANGIOGRAPHIC CATHETER: Brand: OPTITORQUE

## (undated) DEVICE — GW PTFE FIX/CORE FLXTIP .038 3X150CM

## (undated) DEVICE — DOVER HYDROGEL COATED LATEX FOLEY CATHETER, 5 ML, 3-WAY 22 FR/CH (7.3 MM): Brand: DOVER

## (undated) DEVICE — CATH BLLN ANGIO 2X12MM SC EUPHORA RX

## (undated) DEVICE — PRESSURE TUBING: Brand: TRUWAVE

## (undated) DEVICE — NITINOL STONE RETRIEVAL BASKET: Brand: ZERO TIP

## (undated) DEVICE — VALVE HEMSTAT 8FR INNR LUMN CRSS SLT SEAL DSGN WATCHDOG

## (undated) DEVICE — SURGICAL PROCEDURE PACK CRD CATH LOURDES HOSP LF

## (undated) DEVICE — GW SENSR DUALFLEX NITNL STR .038IN 3X150CM

## (undated) DEVICE — CATH URETRL OPN/END 5F70CM

## (undated) DEVICE — PK CYSTO 30

## (undated) DEVICE — FLASH MINI OSTIAL SYSTEM, DUAL BALLOON ANGIOPLASTY CATHETER, 4.0MM X 8MM: Brand: FLASH MINI OSTIAL SYSTEM